# Patient Record
Sex: FEMALE | Race: WHITE | NOT HISPANIC OR LATINO | Employment: FULL TIME | URBAN - METROPOLITAN AREA
[De-identification: names, ages, dates, MRNs, and addresses within clinical notes are randomized per-mention and may not be internally consistent; named-entity substitution may affect disease eponyms.]

---

## 2018-11-28 ENCOUNTER — TELEPHONE (OUTPATIENT)
Dept: NEUROLOGY | Facility: CLINIC | Age: 28
End: 2018-11-28

## 2018-11-28 ENCOUNTER — TRANSCRIBE ORDERS (OUTPATIENT)
Dept: NEUROLOGY | Facility: CLINIC | Age: 28
End: 2018-11-28

## 2018-11-28 DIAGNOSIS — M79.641 PAIN OF RIGHT HAND: Primary | ICD-10-CM

## 2018-11-28 DIAGNOSIS — M54.50 LOW BACK PAIN: Primary | ICD-10-CM

## 2018-12-07 ENCOUNTER — PROCEDURE VISIT (OUTPATIENT)
Dept: NEUROLOGY | Facility: CLINIC | Age: 28
End: 2018-12-07
Payer: COMMERCIAL

## 2018-12-07 DIAGNOSIS — M79.641 PAIN OF RIGHT HAND: ICD-10-CM

## 2018-12-07 PROCEDURE — 95886 MUSC TEST DONE W/N TEST COMP: CPT | Performed by: PSYCHIATRY & NEUROLOGY

## 2018-12-07 PROCEDURE — 95908 NRV CNDJ TST 3-4 STUDIES: CPT | Performed by: PSYCHIATRY & NEUROLOGY

## 2018-12-07 NOTE — PROGRESS NOTES
EMG 1 Limb     Date/Time 12/7/2018 9:12 AM     Performed by  Chiquis Desai     Authorized by Sharita Golden            EMG RIGHT UPPER EXTREMITY    Motor and sensory conduction studies were performed on the right median and ulnar nerves  The distal motor latencies were normal  The motor action potential amplitudes were normal  Motor conduction velocities were normal including conduction of the ulnar nerve across the elbow  The right median and ulnar F waves were normal     Right median and ulnar sensory latencies were normal including median palmar stimulation with normal sensory action potential amplitudes  Concentric needle EMG was performed in various distal and proximal muscles of the right upper extremity including APB, FDI, EDC, brachial radialis, biceps, triceps in the low cervical paraspinal region  There was no evidence of spontaneous activity seen   The compound motor unit potentials were of normal configuration and interference patterns were full or full for effort    IMPRESSION: This is a normal EMG of the right upper extremity    RAVINDER Quijano

## 2018-12-10 ENCOUNTER — EVALUATION (OUTPATIENT)
Dept: PHYSICAL THERAPY | Facility: CLINIC | Age: 28
End: 2018-12-10
Payer: COMMERCIAL

## 2018-12-10 DIAGNOSIS — M54.2 NECK PAIN: Primary | ICD-10-CM

## 2018-12-10 DIAGNOSIS — M54.5 ACUTE BILATERAL LOW BACK PAIN, WITH SCIATICA PRESENCE UNSPECIFIED: ICD-10-CM

## 2018-12-10 PROCEDURE — G8990 OTHER PT/OT CURRENT STATUS: HCPCS | Performed by: PHYSICAL THERAPIST

## 2018-12-10 PROCEDURE — 97162 PT EVAL MOD COMPLEX 30 MIN: CPT | Performed by: PHYSICAL THERAPIST

## 2018-12-10 PROCEDURE — 97014 ELECTRIC STIMULATION THERAPY: CPT | Performed by: PHYSICAL THERAPIST

## 2018-12-10 PROCEDURE — 97140 MANUAL THERAPY 1/> REGIONS: CPT | Performed by: PHYSICAL THERAPIST

## 2018-12-10 PROCEDURE — G8991 OTHER PT/OT GOAL STATUS: HCPCS | Performed by: PHYSICAL THERAPIST
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
## 2018-12-11 ENCOUNTER — TRANSCRIBE ORDERS (OUTPATIENT)
Dept: PHYSICAL THERAPY | Facility: CLINIC | Age: 28
End: 2018-12-11

## 2018-12-11 DIAGNOSIS — M54.5 LOW BACK PAIN, UNSPECIFIED BACK PAIN LATERALITY, UNSPECIFIED CHRONICITY, WITH SCIATICA PRESENCE UNSPECIFIED: ICD-10-CM

## 2018-12-11 DIAGNOSIS — M54.2 CERVICALGIA: Primary | ICD-10-CM

## 2018-12-13 ENCOUNTER — OFFICE VISIT (OUTPATIENT)
Dept: PHYSICAL THERAPY | Facility: CLINIC | Age: 28
End: 2018-12-13
Payer: COMMERCIAL

## 2018-12-13 DIAGNOSIS — M54.2 NECK PAIN: Primary | ICD-10-CM

## 2018-12-13 DIAGNOSIS — M54.5 ACUTE BILATERAL LOW BACK PAIN, WITH SCIATICA PRESENCE UNSPECIFIED: ICD-10-CM

## 2018-12-13 PROCEDURE — 97014 ELECTRIC STIMULATION THERAPY: CPT

## 2018-12-13 PROCEDURE — 97110 THERAPEUTIC EXERCISES: CPT

## 2018-12-13 PROCEDURE — 97140 MANUAL THERAPY 1/> REGIONS: CPT

## 2018-12-13 NOTE — PROGRESS NOTES
Daily Note     Today's date: 2018  Patient name: Nicole Mckeon  : 1990  MRN: [id number]  Referring provider: Jake Mejia MD  Dx:   Encounter Diagnosis     ICD-10-CM    1  Neck pain M54 2    2  Acute bilateral low back pain, with sciatica presence unspecified M54 5        Start Time: 1300  Stop Time: 1400  Total time in clinic (min): 60 minutes    Subjective: pt notes increased pain after initial PT session  Pt states pain is constant and HA's come and go  Objective: See treatment diary below      Assessment: Tolerated treatment fairly  Continued w/ MH/ES f/b manual stretches to cervical and LB  HEP issued and pt educated on PT services and goals for PT  At this time pt doesn't want to try the pool  Sig tightness in cervical paraspinals and SO region  Tightness noted in B LE's also  No ex's till pt's pain levels decreases  Patient would benefit from continued PT      Plan: Continue per plan of care       Precautions: DDD L4-5    Daily Treatment Diary     Modalities  12/10 12/13           MH/ES LB HT 10' 10           MH/ES C/S HT 10' 10                          Manual  12/10 12/13           LB/LE's 8' 10           Neck 8' 15                        HEP  10           Pt ed  10               Exercise Diary

## 2018-12-17 ENCOUNTER — OFFICE VISIT (OUTPATIENT)
Dept: PHYSICAL THERAPY | Facility: CLINIC | Age: 28
End: 2018-12-17
Payer: COMMERCIAL

## 2018-12-17 DIAGNOSIS — M54.2 NECK PAIN: Primary | ICD-10-CM

## 2018-12-17 DIAGNOSIS — M54.5 ACUTE BILATERAL LOW BACK PAIN, WITH SCIATICA PRESENCE UNSPECIFIED: ICD-10-CM

## 2018-12-17 PROCEDURE — 97014 ELECTRIC STIMULATION THERAPY: CPT | Performed by: PHYSICAL THERAPIST

## 2018-12-17 PROCEDURE — 97140 MANUAL THERAPY 1/> REGIONS: CPT | Performed by: PHYSICAL THERAPIST

## 2018-12-17 PROCEDURE — 97110 THERAPEUTIC EXERCISES: CPT | Performed by: PHYSICAL THERAPIST

## 2018-12-17 NOTE — PROGRESS NOTES
Daily Note     Today's date: 2018  Patient name: Blanca Altman  : 1990  MRN: [id number]  Referring provider: Iker Reardon MD  Dx:   Encounter Diagnosis     ICD-10-CM    1  Neck pain M54 2    2  Acute bilateral low back pain, with sciatica presence unspecified M54 5        Start Time: 1345  Stop Time: 1430  Total time in clinic (min): 45 minutes    Subjective: Patient reports her neck is worse, feeling dizzy  Patient taking IBP  Objective: See treatment diary below    Precautions: DDD L4-5    Daily Treatment Diary     Modalities  12/10 12/13 12/17          MH/ES LB HT 10' 10 10'          MH/ES C/S HT 10' 10 10'                         Manual  12/10 12/13 12/17          LB/LE's 8' 10 10'          Neck 8' 15 10'                       HEP  10 reviewed 8'          Pt ed  10               Exercise Diary                                                                                                                                                                                                                                                                                          Assessment: Tolerated treatment fair  Patient would benefit from continued PT Patient has pain with stretching and tolerates slow movements  Patient guarded with movements also  Patient instructed to continue with HEP as previously instructed  Reviewed HEP  Patient felt worse after the heat and stim in her Low back today  Advised patient call md regarding pain and no relief with therapy  MD told her injections also a form of treatment, but patient wanted to try PT first  Patient willing to try aquatic therapy next week  Plan: Continue per plan of care

## 2018-12-20 ENCOUNTER — APPOINTMENT (OUTPATIENT)
Dept: PHYSICAL THERAPY | Facility: CLINIC | Age: 28
End: 2018-12-20
Payer: COMMERCIAL

## 2018-12-20 NOTE — PROGRESS NOTES
Daily Note     Today's date: 2018  Patient name: Gal Cruz  : 1990  MRN: [id number]  Referring provider: Albert Sosa MD  Dx:   Encounter Diagnosis     ICD-10-CM    1  Neck pain M54 2    2  Acute bilateral low back pain, with sciatica presence unspecified M54 5                   Subjective: ***      Objective: See treatment diary below  Precautions: DDD L4-5    Daily Treatment Diary     Modalities  12/10 12/13 12/17          MH/ES LB HT 10' 10 10'          MH/ES C/S HT 10' 10 10'                         Manual  12/10 12/13 12/17          LB/LE's 8' 10 10'          Neck 8' 15 10'                       HEP  10 reviewed 8'          Pt ed  10               Exercise Diary                                                                                                                                                                                                                                                                                            Assessment: Tolerated treatment {Tolerated treatment :3180090800}   Patient {assessment:[id number]}      Plan: {PLAN:2108950970}

## 2018-12-20 NOTE — PROGRESS NOTES
Daily Note     Today's date: 2018  Patient name: Terry Esteban  : 1990  MRN: [id number]  Referring provider: Sabina Rdz MD  Dx:   Encounter Diagnosis     ICD-10-CM    1  Neck pain M54 2    2  Acute bilateral low back pain, with sciatica presence unspecified M54 5        Start Time: 0815  Stop Time: 0900  Total time in clinic (min): 45 minutes    Subjective: Patient notes slipping on mud last night with increased right-sided low back pain this morning  9/10 right LBP  3/10 neck pain with stiffness  Notes interrupted sleep and limited positional tolerance for cooking, cleaning and other IADLs  Objective: See treatment diary below      Assessment: Tolerated treatment well  Patient would benefit from continued PT Moderate tightness bilateral paracervical and SO musculature, right greater than left  Guarded to PROM of LEs especially right  Continues with pain and paraesthesia in right UE/LE      Plan: Continue per plan of care  Add exercises as tolerated        Precautions: DDD L4-5    Daily Treatment Diary     Modalities  12/10 12/13 12/17 12/21         MH/ES LB HT 10' 10 10' 10'         MH/ES C/S HT 10' 10 10' 10'                        Manual  12/10 12/13 12/17 12/21         LB/LE's 8' 10 10' 10'         Neck 8' 15 10' 10'                      HEP  10 reviewed 8' review         Pt ed  10  8             Exercise Diary

## 2018-12-21 ENCOUNTER — OFFICE VISIT (OUTPATIENT)
Dept: PHYSICAL THERAPY | Facility: CLINIC | Age: 28
End: 2018-12-21
Payer: COMMERCIAL

## 2018-12-21 DIAGNOSIS — M54.5 ACUTE BILATERAL LOW BACK PAIN, WITH SCIATICA PRESENCE UNSPECIFIED: ICD-10-CM

## 2018-12-21 DIAGNOSIS — M54.2 NECK PAIN: Primary | ICD-10-CM

## 2018-12-21 PROCEDURE — 97110 THERAPEUTIC EXERCISES: CPT | Performed by: PHYSICAL THERAPIST

## 2018-12-21 PROCEDURE — 97140 MANUAL THERAPY 1/> REGIONS: CPT | Performed by: PHYSICAL THERAPIST

## 2018-12-21 PROCEDURE — 97014 ELECTRIC STIMULATION THERAPY: CPT | Performed by: PHYSICAL THERAPIST

## 2018-12-21 PROCEDURE — 97010 HOT OR COLD PACKS THERAPY: CPT | Performed by: PHYSICAL THERAPIST

## 2018-12-24 ENCOUNTER — OFFICE VISIT (OUTPATIENT)
Dept: NEUROLOGY | Facility: CLINIC | Age: 28
End: 2018-12-24
Payer: COMMERCIAL

## 2018-12-24 ENCOUNTER — OFFICE VISIT (OUTPATIENT)
Dept: PHYSICAL THERAPY | Facility: CLINIC | Age: 28
End: 2018-12-24
Payer: COMMERCIAL

## 2018-12-24 VITALS
DIASTOLIC BLOOD PRESSURE: 68 MMHG | WEIGHT: 208 LBS | BODY MASS INDEX: 34.66 KG/M2 | HEIGHT: 65 IN | SYSTOLIC BLOOD PRESSURE: 100 MMHG | HEART RATE: 72 BPM

## 2018-12-24 DIAGNOSIS — S06.0X0A CONCUSSION WITHOUT LOSS OF CONSCIOUSNESS, INITIAL ENCOUNTER: Primary | ICD-10-CM

## 2018-12-24 DIAGNOSIS — M54.2 NECK PAIN: Primary | ICD-10-CM

## 2018-12-24 DIAGNOSIS — M54.5 ACUTE BILATERAL LOW BACK PAIN, WITH SCIATICA PRESENCE UNSPECIFIED: ICD-10-CM

## 2018-12-24 DIAGNOSIS — M54.16 RADICULOPATHY, LUMBAR REGION: ICD-10-CM

## 2018-12-24 DIAGNOSIS — R42 DIZZINESS AND GIDDINESS: ICD-10-CM

## 2018-12-24 DIAGNOSIS — G54.0 BRACHIAL PLEXOPATHY: ICD-10-CM

## 2018-12-24 DIAGNOSIS — G47.9 SLEEP DISTURBANCE: ICD-10-CM

## 2018-12-24 PROCEDURE — 99204 OFFICE O/P NEW MOD 45 MIN: CPT | Performed by: PSYCHIATRY & NEUROLOGY

## 2018-12-24 PROCEDURE — 97110 THERAPEUTIC EXERCISES: CPT | Performed by: PHYSICAL THERAPIST

## 2018-12-24 PROCEDURE — 97140 MANUAL THERAPY 1/> REGIONS: CPT | Performed by: PHYSICAL THERAPIST

## 2018-12-24 PROCEDURE — 97014 ELECTRIC STIMULATION THERAPY: CPT | Performed by: PHYSICAL THERAPIST

## 2018-12-24 PROCEDURE — 97010 HOT OR COLD PACKS THERAPY: CPT | Performed by: PHYSICAL THERAPIST

## 2018-12-24 RX ORDER — METHOCARBAMOL 750 MG/1
1-2 TABLET, FILM COATED ORAL EVERY 6 HOURS PRN
Refills: 0 | COMMUNITY
Start: 2018-11-08 | End: 2021-12-27 | Stop reason: ALTCHOICE

## 2018-12-24 RX ORDER — IBUPROFEN 800 MG/1
1 TABLET ORAL 3 TIMES DAILY PRN
COMMUNITY
Start: 2018-12-21

## 2018-12-24 NOTE — PROGRESS NOTES
Daily Note     Today's date: 2018  Patient name: Blanca Altman  : 1990  MRN: [id number]  Referring provider: Iker Reardon MD  Dx:   Encounter Diagnosis     ICD-10-CM    1  Neck pain M54 2    2  Acute bilateral low back pain, with sciatica presence unspecified M54 5                   Subjective: Patient complains of 5/10 to low back and 4/10 to cervical area  Objective: See treatment diary below      Precautions: DDD L4-5    Daily Treatment Diary     Modalities  12/10 12/13 12/17 12/21 12/24        MH/ES LB HT 10' 10 10' 10' 10        MH/ES C/S HT 10' 10 10' 10' 10                       Manual  12/10 12/13 12/17 12/21 12/24        LB/LE's 8' 10 10' 10' 10        Neck 8' 15 10' 10' 10                     HEP  10 reviewed 8' review         Pt ed  10  8             Exercise Diary              SLANT 30"/4x            BIKE 5 min                                                                                                                                                                                                                                                                  Assessment: Tolerated treatment fair  Patient exhibited good technique with therapeutic exercises and would benefit from continued PT, cervical spine is guarded with ROM , 

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
## 2018-12-26 ENCOUNTER — APPOINTMENT (OUTPATIENT)
Dept: PHYSICAL THERAPY | Facility: CLINIC | Age: 28
End: 2018-12-26
Payer: COMMERCIAL

## 2018-12-26 NOTE — PROGRESS NOTES
Daily Note     Today's date: 2018  Patient name: Lizzy Shelley  : 1990  MRN: [id number]  Referring provider: Jeanie Kanner, MD  Dx:   Encounter Diagnosis     ICD-10-CM    1  Neck pain M54 2    2  Acute bilateral low back pain, with sciatica presence unspecified M54 5                   Subjective: ***      Objective: See treatment diary below      Precautions: DDD L4-5    Daily Treatment Diary     Modalities  12/10 12/13 12/17 12/21 12/24        MH/ES LB HT 10' 10 10' 10' 10        MH/ES C/S HT 10' 10 10' 10' 10                       Manual  12/10 12/13 12/17 12/21 12/24        LB/LE's 8' 10 10' 10' 10        Neck 8' 15 10' 10' 10                     HEP  10 reviewed 8' review         Pt ed  10  8             Exercise Diary              SLANT 30"/4x            BIKE 5 min                                                                                                                                                                                                                                                        Assessment: Tolerated treatment {Tolerated treatment :8028655130}   Patient {assessment:4170065822}      Plan: {PLAN:[id number]}

## 2019-01-02 ENCOUNTER — OFFICE VISIT (OUTPATIENT)
Dept: PHYSICAL THERAPY | Facility: CLINIC | Age: 29
End: 2019-01-02
Payer: COMMERCIAL

## 2019-01-02 DIAGNOSIS — M54.2 NECK PAIN: Primary | ICD-10-CM

## 2019-01-02 DIAGNOSIS — M54.5 ACUTE BILATERAL LOW BACK PAIN, WITH SCIATICA PRESENCE UNSPECIFIED: ICD-10-CM

## 2019-01-02 PROCEDURE — 97140 MANUAL THERAPY 1/> REGIONS: CPT | Performed by: PHYSICAL THERAPIST

## 2019-01-02 PROCEDURE — 97014 ELECTRIC STIMULATION THERAPY: CPT | Performed by: PHYSICAL THERAPIST

## 2019-01-02 PROCEDURE — 97110 THERAPEUTIC EXERCISES: CPT | Performed by: PHYSICAL THERAPIST

## 2019-01-02 PROCEDURE — 97010 HOT OR COLD PACKS THERAPY: CPT | Performed by: PHYSICAL THERAPIST

## 2019-01-02 NOTE — PROGRESS NOTES
Daily Note     Today's date: 2019  Patient name: Lizzy Shelley  : 1990  MRN: [id number]  Referring provider: Jeanie Kanner, MD  Dx:   Encounter Diagnosis     ICD-10-CM    1  Neck pain M54 2    2  Acute bilateral low back pain, with sciatica presence unspecified M54 5        Start Time: 1515          Subjective: Patient complains of increased neck pain secondary to damp weather today  Objective: See treatment diary below        Precautions: DDD L4-5    Daily Treatment Diary     Modalities  12/10 12/13 12/17 12/21 12/24 1/2       MH/ES LB HT 10' 10 10' 10' 10 10       MH/ES C/S HT 10' 10 10' 10' 10 10                      Manual  12/10 12/13 12/17 12/21 12/24 1/2       LB/LE's 8' 10 10' 10' 10 10       Neck 8' 15 10' 10' 10 10                    HEP  10 reviewed 8' review         Pt ed  10  8             Exercise Diary         SLANT 30"/4x     4x       BIKE 5 min     5 min       Ball EXS      30x                                                                                                                                                                                                                                      Assessment: Tolerated treatment fair  Patient exhibited good technique with therapeutic exercises and would benefit from continued PT, patient still guarded  with MT but making slow progress  Plan: Progress treatment as tolerated

## 2019-01-03 ENCOUNTER — OFFICE VISIT (OUTPATIENT)
Dept: PHYSICAL THERAPY | Facility: CLINIC | Age: 29
End: 2019-01-03
Payer: COMMERCIAL

## 2019-01-03 DIAGNOSIS — M54.2 NECK PAIN: Primary | ICD-10-CM

## 2019-01-03 DIAGNOSIS — M54.5 ACUTE BILATERAL LOW BACK PAIN, WITH SCIATICA PRESENCE UNSPECIFIED: ICD-10-CM

## 2019-01-03 PROCEDURE — 97014 ELECTRIC STIMULATION THERAPY: CPT | Performed by: PHYSICAL THERAPIST

## 2019-01-03 PROCEDURE — 97110 THERAPEUTIC EXERCISES: CPT | Performed by: PHYSICAL THERAPIST

## 2019-01-03 PROCEDURE — 97010 HOT OR COLD PACKS THERAPY: CPT | Performed by: PHYSICAL THERAPIST

## 2019-01-03 PROCEDURE — 97140 MANUAL THERAPY 1/> REGIONS: CPT | Performed by: PHYSICAL THERAPIST

## 2019-01-03 NOTE — PROGRESS NOTES
Daily Note     Today's date: 1/3/2019  Patient name: Baron Grullon  : 1990  MRN: [id number]  Referring provider: Beto Jasmine MD  Dx:   Encounter Diagnosis     ICD-10-CM    1  Neck pain M54 2    2  Acute bilateral low back pain, with sciatica presence unspecified M54 5        Start Time: 1300  Stop Time: 1345  Total time in clinic (min): 45 minutes    Subjective: Patient reports she saw neurologist who ordered vestibular PT, and possible brachial plexus injury on right  Objective: See treatment diary below    Precautions: DDD L4-5    Daily Treatment Diary     Modalities  12/10 12/13 12/17 12/21 12/24 1/2 1/3      MH/ES LB HT 10' 10 10' 10' 10 10 10'      MH/ES C/S HT 10' 10 10' 10' 10 10 10'                     Manual  12/10 12/13 12/17 12/21 12/24 1/2 1/3      LB/LE's 8' 10 10' 10' 10 10 10'      Neck 8' 15 10' 10' 10 10 10'                   HEP  10 reviewed 8' review         Pt ed  10  8             Exercise Diary  12/24     1/2 1/3      SLANT 30"/4x     4x 30" 4x      BIKE 5 min     5 min 5'      Ball EXS      30x nt      rows       30# 30x                                                                                                                                                                                                                        Assessment: Tolerated treatment well  Patient would benefit from continued PT Patient tolerates stretching UE's and LE's better with less guarding  Patient c/o tingling in right lateral fingers with left side bending  Plan: Continue per plan of care

## 2019-01-04 ENCOUNTER — APPOINTMENT (OUTPATIENT)
Dept: PHYSICAL THERAPY | Facility: CLINIC | Age: 29
End: 2019-01-04
Payer: COMMERCIAL

## 2019-01-08 ENCOUNTER — EVALUATION (OUTPATIENT)
Dept: PHYSICAL THERAPY | Facility: CLINIC | Age: 29
End: 2019-01-08
Payer: COMMERCIAL

## 2019-01-08 DIAGNOSIS — S06.0X0D CONCUSSION WITHOUT LOSS OF CONSCIOUSNESS, SUBSEQUENT ENCOUNTER: ICD-10-CM

## 2019-01-08 DIAGNOSIS — R42 DIZZINESS AND GIDDINESS: Primary | ICD-10-CM

## 2019-01-08 DIAGNOSIS — H81.11 BPPV (BENIGN PAROXYSMAL POSITIONAL VERTIGO), RIGHT: ICD-10-CM

## 2019-01-08 PROCEDURE — 97110 THERAPEUTIC EXERCISES: CPT | Performed by: PHYSICAL THERAPIST

## 2019-01-08 PROCEDURE — 97162 PT EVAL MOD COMPLEX 30 MIN: CPT | Performed by: PHYSICAL THERAPIST

## 2019-01-08 PROCEDURE — G8991 OTHER PT/OT GOAL STATUS: HCPCS | Performed by: PHYSICAL THERAPIST

## 2019-01-08 PROCEDURE — 95992 CANALITH REPOSITIONING PROC: CPT | Performed by: PHYSICAL THERAPIST

## 2019-01-08 PROCEDURE — G8990 OTHER PT/OT CURRENT STATUS: HCPCS | Performed by: PHYSICAL THERAPIST

## 2019-01-11 ENCOUNTER — OFFICE VISIT (OUTPATIENT)
Dept: PHYSICAL THERAPY | Facility: CLINIC | Age: 29
End: 2019-01-11
Payer: COMMERCIAL

## 2019-01-11 DIAGNOSIS — R42 DIZZINESS AND GIDDINESS: Primary | ICD-10-CM

## 2019-01-11 DIAGNOSIS — S06.0X0D CONCUSSION WITHOUT LOSS OF CONSCIOUSNESS, SUBSEQUENT ENCOUNTER: ICD-10-CM

## 2019-01-11 DIAGNOSIS — H81.11 BPPV (BENIGN PAROXYSMAL POSITIONAL VERTIGO), RIGHT: ICD-10-CM

## 2019-01-11 PROCEDURE — 95992 CANALITH REPOSITIONING PROC: CPT | Performed by: PHYSICAL THERAPIST

## 2019-01-11 PROCEDURE — 97112 NEUROMUSCULAR REEDUCATION: CPT | Performed by: PHYSICAL THERAPIST

## 2019-01-11 NOTE — PROGRESS NOTES
Daily Note     Today's date: 2019  Patient name: Ledy Simental  : 1990  MRN: [id number]  Referring provider: Marcos Hennessy MD  Dx:   Encounter Diagnosis     ICD-10-CM    1  Dizziness and giddiness R42    2  Concussion without loss of consciousness, subsequent encounter S06 0X0D    3  BPPV (benign paroxysmal positional vertigo), right H81 11        Start Time: 0945  Stop Time: 1030  Total time in clinic (min): 45 minutes    Subjective: Dizzy after last session  Pt reports she is doing gaze exercises but must do them slowly  Objective: See treatment diary below      Assessment: Tolerated treatment fair  Pt needed frequent rest periods due to symptom production  C/o "floaters" at times  Light sensitivity  Pt has difficulty with moving her head and eyes quickly and modified pacing  Patient would benefit from continued PT      Plan: Continue per plan of care       Precautions: standard    Daily Treatment Diary     Manual             Epley Rx2 x1                                                                   Exercise Diary             Peg   15x ea           Peg board hi low  10x ea           Ball toss  3'           Ball/head rotation  10x2 ea           Tandem walk  Side step  grapevine  2x ea           Gaze exs  10'                                                                                                                                             HEP x1gaze  Home Epley 10'                                                       Modalities

## 2019-01-16 ENCOUNTER — APPOINTMENT (OUTPATIENT)
Dept: PHYSICAL THERAPY | Facility: CLINIC | Age: 29
End: 2019-01-16
Payer: COMMERCIAL

## 2019-01-21 ENCOUNTER — OFFICE VISIT (OUTPATIENT)
Dept: PHYSICAL THERAPY | Facility: CLINIC | Age: 29
End: 2019-01-21
Payer: COMMERCIAL

## 2019-01-21 DIAGNOSIS — R42 DIZZINESS AND GIDDINESS: Primary | ICD-10-CM

## 2019-01-21 DIAGNOSIS — S06.0X0D CONCUSSION WITHOUT LOSS OF CONSCIOUSNESS, SUBSEQUENT ENCOUNTER: ICD-10-CM

## 2019-01-21 DIAGNOSIS — H81.11 BPPV (BENIGN PAROXYSMAL POSITIONAL VERTIGO), RIGHT: ICD-10-CM

## 2019-01-21 PROCEDURE — 97112 NEUROMUSCULAR REEDUCATION: CPT | Performed by: PHYSICAL THERAPIST

## 2019-01-21 PROCEDURE — 95992 CANALITH REPOSITIONING PROC: CPT | Performed by: PHYSICAL THERAPIST

## 2019-01-21 NOTE — PROGRESS NOTES
Daily Note     Today's date: 2019  Patient name: Macarena Blair  : 1990  MRN: [id number]  Referring provider: Ashlyn Sigala MD  Dx:   Encounter Diagnosis     ICD-10-CM    1  Dizziness and giddiness R42    2  Concussion without loss of consciousness, subsequent encounter S06 0X0D    3  BPPV (benign paroxysmal positional vertigo), right H81 11        Start Time: 1400  Stop Time: 1445  Total time in clinic (min): 45 minutes    Subjective: Patient reports she is seeing a chiropractor and feeling better in neck, back and dizziness  Objective: See treatment diary below  Precautions: standard    Daily Treatment Diary     Manual            Epley Rx2 x1 R x1                                                                  Exercise Diary            Peg   15x ea 15x ea          Peg board hi low  10x ea 10xea          Ball toss  3' 3'          Ball/head rotation  10x2 ea 10x ea          Tandem walk  Side step  grapevine  2x ea 2x ea          Gaze exs  10' 10'                                                                                                                                            HEP x1gaze  Home Epley 10'                                                         Assessment: Tolerated treatment fair  Patient would benefit from continued PT Patient got lightheaded during ball toss exercises, dizzy with gaze exercises  Patient c/o LBP with bending exercises, modified exercises to take pressure off back but still challenge eyes  Patient planning on returning to work soon as her FMLA is expiring  Plan: Continue per plan of care

## 2019-01-28 ENCOUNTER — OFFICE VISIT (OUTPATIENT)
Dept: PHYSICAL THERAPY | Facility: CLINIC | Age: 29
End: 2019-01-28
Payer: COMMERCIAL

## 2019-01-28 ENCOUNTER — OFFICE VISIT (OUTPATIENT)
Dept: NEUROLOGY | Facility: CLINIC | Age: 29
End: 2019-01-28
Payer: COMMERCIAL

## 2019-01-28 VITALS
BODY MASS INDEX: 34.69 KG/M2 | WEIGHT: 208.2 LBS | DIASTOLIC BLOOD PRESSURE: 68 MMHG | SYSTOLIC BLOOD PRESSURE: 110 MMHG | HEART RATE: 76 BPM | HEIGHT: 65 IN

## 2019-01-28 DIAGNOSIS — H81.11 BPPV (BENIGN PAROXYSMAL POSITIONAL VERTIGO), RIGHT: ICD-10-CM

## 2019-01-28 DIAGNOSIS — G54.0 BRACHIAL PLEXOPATHY: ICD-10-CM

## 2019-01-28 DIAGNOSIS — R42 DIZZINESS AND GIDDINESS: ICD-10-CM

## 2019-01-28 DIAGNOSIS — M54.16 RADICULOPATHY, LUMBAR REGION: ICD-10-CM

## 2019-01-28 DIAGNOSIS — R42 DIZZINESS AND GIDDINESS: Primary | ICD-10-CM

## 2019-01-28 DIAGNOSIS — S06.0X0D CONCUSSION WITHOUT LOSS OF CONSCIOUSNESS, SUBSEQUENT ENCOUNTER: Primary | ICD-10-CM

## 2019-01-28 PROCEDURE — 99213 OFFICE O/P EST LOW 20 MIN: CPT | Performed by: PSYCHIATRY & NEUROLOGY

## 2019-01-28 PROCEDURE — 97112 NEUROMUSCULAR REEDUCATION: CPT | Performed by: PHYSICAL THERAPIST

## 2019-01-28 NOTE — PROGRESS NOTES
Daily Note     Today's date: 2019  Patient name: Lanre Beyer  : 1990  MRN: [id number]  Referring provider: Florian White MD  Dx:   Encounter Diagnosis     ICD-10-CM    1  Dizziness and giddiness R42    2  BPPV (benign paroxysmal positional vertigo), right H81 11        Start Time: 0930  Stop Time: 1010  Total time in clinic (min): 40 minutes    Subjective: Pt still noting difficulty with dizziness  Pt also noting numbness in right hand digits 4,5  Seeing chiropractor  Objective: See treatment diary below      Assessment: Tolerated treatment well  Patient pt still has difficulty moving from head down to head up in repetition and with gaze exercises where eyes are moving only with head fixed  Plan: Continue per plan of care       Precautions: standard    Daily Treatment Diary     Manual           Epley Rx2 x1 R x1                                                                  Exercise Diary           Peg   15x ea 15x ea 15x         Peg board hi low  10x ea 10xea 10x         Ball toss  3' 3' 3'         Ball/head rotation  10x2 ea 10x ea          Tandem walk  Side step  grapevine  2x ea 2x ea 3x ea         Gaze exs  10' 10' 15'                                                                                                                                           HEP x1gaze  Home Epley 10'                                                   0

## 2019-01-28 NOTE — PROGRESS NOTES
Blanca Altman is a 25 y o  female returns in follow-up today with history of concussion, neck pain and low back pain    Assessment:  1  Concussion without loss of consciousness, subsequent encounter    2  Dizziness and giddiness    3  Brachial plexopathy    4  Radiculopathy, lumbar region        Plan:  Continue vestibular therapy  Continue to work with Orthopedics regarding neck pain and back pain  Follow-up 6 weeks    Discussion:  Ralph Zavaleta continues to have symptoms following her concussion  She will continue with vestibular therapy  She anticipates returning back to work later this week I will see her back in follow-up in 6 weeks      Subjective:    HPI  Ralph Zavaleta reports that she has been going to vestibular therapy and has seen some improvements  They have been working on visual tracking  She continues to note issues with dizziness  She states she was at a  yesterday and the floor was uneven and had a rug with a pattern that made it difficult for her to keep standing as she felt unsteady with this  She states that recently her 's cologne will give her headache but has not been noting frequent headaches  She states she continues to find memory issues and has to write things down  She continues to have back pain and anticipates having an ejection on Wednesday  She continues to have pain radiating into her right upper extremity probably in the medial 2 fingers of the hand  She anticipates returning back to work in the near future  She did find that melatonin was not effective to help with her still sleeping    She does not want anything heavier for sleep as she has to take care of 1 of her children who has asthma      Past Medical History:   Diagnosis Date    Brachial plexopathy     radiculopathy    Concussion     Dizziness and giddiness     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       Family History:  Family History   Problem Relation Age of Onset    Diabetes type II Mother  Deafness Father     Depression Father     Asthma Son        Past Surgical History:  Past Surgical History:   Procedure Laterality Date     SECTION      x2       Social History:   reports that she has never smoked  She has never used smokeless tobacco  She reports that she drinks alcohol  She reports that she does not use drugs  Allergies:  Fioricet [butalbital-apap-caffeine] and Fioricet [butalbital-apap-caffeine]      Current Outpatient Prescriptions:     ibuprofen (MOTRIN) 800 mg tablet, Take 1 tablet by mouth 3 (three) times a day as needed, Disp: , Rfl:     Melatonin 5 MG CAPS, One or 2 p o  q h s  p r n  sleep, Disp: 30 capsule, Rfl: 5    methocarbamol (ROBAXIN) 750 mg tablet, Take 1-2 tablets by mouth every 6 (six) hours as needed, Disp: , Rfl: 0    I have reviewed the past medical, social and family history, current medications, allergies, vitals, review of systems and updated this information as appropriate today     Objective:    Vitals:  Blood pressure 110/68, pulse 76, height 5' 4 5" (1 638 m), weight 94 4 kg (208 lb 3 2 oz)  Physical Exam    Neurological Exam  GENERAL:  Well-developed well-nourished woman in no acute distress  HEENT/NECK: Head is atraumatic normocephalic, neck is supple  NEUROLOGIC:  Mental Status: Awake and alert without aphasia  Cranial Nerves: Extraocular movements are full  Face is symmetrical  Coordination:  Gait is stable            ROS:    Review of Systems   Constitutional: Positive for fatigue  Negative for appetite change and fever  HENT: Positive for postnasal drip  Negative for hearing loss, tinnitus, trouble swallowing and voice change  Eyes: Negative  Negative for photophobia and pain  Respiratory: Negative  Negative for shortness of breath  Cardiovascular: Negative  Negative for palpitations  Gastrointestinal: Positive for nausea  Negative for vomiting  Endocrine: Negative  Negative for cold intolerance and heat intolerance  Genitourinary: Negative  Negative for dysuria, frequency and urgency  Musculoskeletal: Positive for back pain, neck pain and neck stiffness  Negative for myalgias  Skin: Negative  Negative for rash  Neurological: Positive for light-headedness, numbness (right hand) and headaches (occasional)  Negative for dizziness, tremors, seizures, syncope, facial asymmetry, speech difficulty and weakness  Hematological: Negative  Does not bruise/bleed easily  Psychiatric/Behavioral: Negative for confusion, hallucinations and sleep disturbance  Trouble remembering   All other systems reviewed and are negative

## 2019-01-29 ENCOUNTER — OFFICE VISIT (OUTPATIENT)
Dept: PHYSICAL THERAPY | Facility: CLINIC | Age: 29
End: 2019-01-29
Payer: COMMERCIAL

## 2019-01-29 DIAGNOSIS — H81.11 BPPV (BENIGN PAROXYSMAL POSITIONAL VERTIGO), RIGHT: Primary | ICD-10-CM

## 2019-01-29 PROCEDURE — 97112 NEUROMUSCULAR REEDUCATION: CPT | Performed by: PHYSICAL THERAPIST

## 2019-01-29 NOTE — PROGRESS NOTES
Daily Note     Today's date: 2019  Patient name: Lanre Beyer  : 1990  MRN: [id number]  Referring provider: Florian White MD  Dx:   Encounter Diagnosis     ICD-10-CM    1  BPPV (benign paroxysmal positional vertigo), right H81 11        Start Time: 0800  Stop Time: 0830  Total time in clinic (min): 30 minutes    Subjective: Pt continues to report varying episodes of dizziness  Still having difficulty tracking   especially on  computer screen  Objective: See treatment diary below      Assessment: Tolerated treatment fairly well today    Patient balance improving  Independent correction on compliant surface today  Added compliant challenge with use of [place] Eye Chart for gaze challenges  Pt tolerated long consistent periods of gaze exercises without rest       Plan: Continue per plan of care         Precautions: standard    Daily Treatment Diary     Manual          Epley Rx2 x1 R x1                                                                  Exercise Diary          Peg   15x ea 15x ea 15x 15x ea        Peg board hi low  10x ea 10xea 10x 10x        Ball toss  3' 3' 3' 3'        Ball/head rotation  10x2 ea 10x ea  10ea eyes fixed  10xea head/eye move        Tandem walk  Side step  grapevine  2x ea 2x ea 3x ea 3x ea        Gaze exs  10' 10' 15' 15'        Pods stance Eye chart                                                                                                                                  HEP x1gaze  Home Epley 10'

## 2019-02-07 ENCOUNTER — OFFICE VISIT (OUTPATIENT)
Dept: PHYSICAL THERAPY | Age: 29
End: 2019-02-07
Payer: COMMERCIAL

## 2019-02-07 ENCOUNTER — PROCEDURE VISIT (OUTPATIENT)
Dept: NEUROLOGY | Facility: CLINIC | Age: 29
End: 2019-02-07
Payer: COMMERCIAL

## 2019-02-07 DIAGNOSIS — R42 DIZZINESS AND GIDDINESS: ICD-10-CM

## 2019-02-07 DIAGNOSIS — M54.16 RADICULOPATHY, LUMBAR REGION: Primary | ICD-10-CM

## 2019-02-07 DIAGNOSIS — S06.0X0D CONCUSSION WITHOUT LOSS OF CONSCIOUSNESS, SUBSEQUENT ENCOUNTER: Primary | ICD-10-CM

## 2019-02-07 DIAGNOSIS — H81.11 BPPV (BENIGN PAROXYSMAL POSITIONAL VERTIGO), RIGHT: ICD-10-CM

## 2019-02-07 PROCEDURE — 95886 MUSC TEST DONE W/N TEST COMP: CPT | Performed by: PSYCHIATRY & NEUROLOGY

## 2019-02-07 PROCEDURE — 95910 NRV CNDJ TEST 7-8 STUDIES: CPT | Performed by: PSYCHIATRY & NEUROLOGY

## 2019-02-07 PROCEDURE — 97112 NEUROMUSCULAR REEDUCATION: CPT

## 2019-02-07 NOTE — PROGRESS NOTES
EMG 2 limb lower extremity     Date/Time 2/7/2019 9:29 AM     Performed by  Issa Lepe     Authorized by Issa Lepe            EMG BILATERAL LOWER EXTREMITY    Motor and sensory conduction studies were performed on the bilateral peroneal, tibial and sural nerves  The distal motor latencies were normal  The motor action potential amplitudes were normal  Conduction velocities were normal including conduction of the peroneal nerve across the fibular head  Bilateral peroneal and tibial F waves were normal     Bilateral sural distal sensory latencies were normal with normal sensory action potential amplitudes  H  reflexes were symmetrically normal     Concentric needle EMG was performed in various distal and proximal muscles of the lower extremities bilaterally including EDB, tibialis anterior, gastrocnemius medius, vastus lateralis, biceps femoralis short head and the low lumbar paraspinal regions  There is no evidence of spontaneous activity seen  The compound motor unit potentials were of normal configuration and interference patterns were full or full for effort  IMPRESSION: This is a normal EMG of the bilateral lower extremities      RAVINDER Fang

## 2019-02-07 NOTE — PROGRESS NOTES
Daily Note     Today's date: 2019  Patient name: Tori Espinoza  : 1990  MRN: [id number]  Referring provider: Makayla Gracia MD  Dx:   Encounter Diagnosis     ICD-10-CM    1  Concussion without loss of consciousness, subsequent encounter S06 [id number]    2  Dizziness and giddiness R42    3  BPPV (benign paroxysmal positional vertigo), right H81 11        Start Time: 0800  Stop Time: 0830  Total time in clinic (min): 30 minutes    Subjective: pt notes increased LBP this week  She states she had an injection last Wednesday and has had increased pain since  She notes looking at the computer for work is getting better  Objective: See treatment diary below      Assessment: Tolerated treatment fairly well  Limited ex's today due to increased LBP, no bending or twisting ex's today  Pt did have some dizziness w/ Patient would benefit from continued PT      Plan: Continue per plan of care       Precautions: standard    Daily Treatment Diary     Manual          Epley Rx2 x1 R x1                                                                  Exercise Diary   2/       Peg   15x ea 15x ea 15x 15x ea NT       Peg board hi low  10x ea 10xea 10x 10x NT       Ball toss  3' 3' 3' 3' NT       Ball/head rotation  10x2 ea 10x ea  10ea eyes fixed  10xea head/eye move 10'       Tandem walk  Side step  grapevine  2x ea 2x ea 3x ea 3x ea NT       Gaze exs  10' 10' 15' 15' 15       Pods stance Eye chart                                                                                                                                  HEP x1gaze  Home Epley 10'

## 2019-02-08 ENCOUNTER — TELEPHONE (OUTPATIENT)
Dept: NEUROLOGY | Facility: CLINIC | Age: 29
End: 2019-02-08

## 2019-02-08 NOTE — TELEPHONE ENCOUNTER
Patient requested recent office noted from visit with Dr Antonietta Ziegler  Note was printed and given to patient yesterday  Released form was sign by patient yesterday

## 2019-02-20 ENCOUNTER — OFFICE VISIT (OUTPATIENT)
Dept: PHYSICAL THERAPY | Age: 29
End: 2019-02-20
Payer: COMMERCIAL

## 2019-02-20 DIAGNOSIS — R42 DIZZINESS AND GIDDINESS: Primary | ICD-10-CM

## 2019-02-20 DIAGNOSIS — H81.11 BPPV (BENIGN PAROXYSMAL POSITIONAL VERTIGO), RIGHT: ICD-10-CM

## 2019-02-20 PROCEDURE — 97112 NEUROMUSCULAR REEDUCATION: CPT | Performed by: PHYSICAL THERAPIST

## 2019-02-20 NOTE — PROGRESS NOTES
Daily Note     Today's date: 2019  Patient name: Matthew Gibson  : 1990  MRN: [id number]  Referring provider: Kayleen King MD  Dx:   Encounter Diagnosis     ICD-10-CM    1  Dizziness and giddiness R42    2  BPPV (benign paroxysmal positional vertigo), right H81 11        Start Time: 0745  Stop Time: 0830  Total time in clinic (min): 45 minutes    Subjective: Pt reports her symptoms vary with good and bad days  She has returned to work and has some difficulty concentrating and working on the computer  She must take rests  She reports today is a bad day and she has increased dizziness  Objective: See treatment diary below      Assessment: Tolerated treatment fair  Patient needed more frequent rests today  Pt did well with balance on compliant surfaces but it did provoke dizziness  She had difficulty with gaze exercises with eyes fixed and head moving today  Plan: Continue per plan of care         Precautions: standard    Daily Treatment Diary     Manual          Epley Rx2 x1 R x1                                                                  Exercise Diary        Peg   15x ea 15x ea 15x 15x ea NT 10x      Peg board hi low  10x ea 10xea 10x 10x NT       Ball toss  3' 3' 3' 3' NT       Ball/head rotation  10x2 ea 10x ea  10ea eyes fixed  10xea head/eye move 10' 10x ea  Ball/head movement 10x      Tandem walk  Side step  grapevine  2x ea 2x ea 3x ea 3x ea NT 2x      Pod balance  Fwd  Staggered  side       3x ea       Gaze exs  10' 10' 15' 15' 15 15'      Pods stance Eye chart                                                                                                                                  HEP x1gaze  Home Epley 10'

## 2019-02-27 ENCOUNTER — APPOINTMENT (OUTPATIENT)
Dept: PHYSICAL THERAPY | Age: 29
End: 2019-02-27
Payer: COMMERCIAL

## 2019-02-28 NOTE — PROGRESS NOTES
Patient started therapy for her dizziness and has not been seen for her neck or back treatments since January 3, Patient states she was seeing a chiropractor instead for those areas  D/C PT to neck and back at this time

## 2019-03-26 NOTE — PROGRESS NOTES
Pt was last seen for PT on 2/20  Pt returned to work  She has not returned to physical therapy and may have been referred for vision therapy  At last session she had good balance on complaint and non-compliant challenges  She still has some difficulty with gaze exercises

## 2019-05-01 ENCOUNTER — TELEPHONE (OUTPATIENT)
Dept: NEUROLOGY | Facility: CLINIC | Age: 29
End: 2019-05-01

## 2019-06-11 ENCOUNTER — TELEPHONE (OUTPATIENT)
Dept: NEUROLOGY | Facility: CLINIC | Age: 29
End: 2019-06-11

## 2019-09-24 ENCOUNTER — DOCUMENTATION (OUTPATIENT)
Dept: NEUROLOGY | Facility: CLINIC | Age: 29
End: 2019-09-24

## 2019-09-24 NOTE — PROGRESS NOTES
Received 2nd request for medical records from [facility]  Faxed request to [facility] on 9/24

## 2020-07-06 ENCOUNTER — EVALUATION (OUTPATIENT)
Dept: PHYSICAL THERAPY | Age: 30
End: 2020-07-06
Payer: COMMERCIAL

## 2020-07-06 DIAGNOSIS — M54.16 LUMBAR RADICULOPATHY: Primary | ICD-10-CM

## 2020-07-06 PROCEDURE — 97162 PT EVAL MOD COMPLEX 30 MIN: CPT | Performed by: PHYSICAL THERAPIST

## 2020-07-06 PROCEDURE — G0283 ELEC STIM OTHER THAN WOUND: HCPCS | Performed by: PHYSICAL THERAPIST

## 2020-07-06 PROCEDURE — 97140 MANUAL THERAPY 1/> REGIONS: CPT | Performed by: PHYSICAL THERAPIST

## 2020-07-06 PROCEDURE — 97014 ELECTRIC STIMULATION THERAPY: CPT | Performed by: PHYSICAL THERAPIST

## 2020-07-06 PROCEDURE — 97110 THERAPEUTIC EXERCISES: CPT | Performed by: PHYSICAL THERAPIST

## 2020-07-06 NOTE — PROGRESS NOTES
PT Evaluation     Today's date: 2020  Patient name: Gema Ambrocio  : 1990  MRN: [id number]  Referring provider: Ashley Betancur DO  Dx:   Encounter Diagnosis     ICD-10-CM    1  Lumbar radiculopathy M54 16        Start Time: 1300  Stop Time: 1400  Total time in clinic (min): 60 minutes    Assessment  Assessment details: Gema Ambrocio is a 30 y o  female who presents with pain, decreased strength, decreased ROM, ambulatory dysfunction and postural  dysfunction  Due to these impairments, Patient has difficulty performing a/iadls  Patient's clinical presentation is consistent with their referring diagnosis of lumbar  radiculopathy  Patient would benefit from skilled physical therapy to address their aforementioned impairments, improve their level of function and to improve their overall quality of life  Impairments: abnormal gait, abnormal or restricted ROM, abnormal movement, activity intolerance, impaired balance, impaired physical strength, lacks appropriate home exercise program, pain with function, weight-bearing intolerance, poor posture  and poor body mechanics  Understanding of Dx/Px/POC: good  Goals  ST-3 WEEKS  1  Decrease pain by 2 points on VAS at its worst  And eliminate leg pain  2  Increase ROM by > 5 deg in all deficients planes  3   Increase CORE/LE by 1/2 MMT grade in all deficient planes  LT-6 WEEKS  1  Patient to be independent with a/iadls and increase sitting and standing tolerance  2  Increase functional activities for leisure and home activities to previous LOF    3  Independent with HEP and/or fitness program     Plan  Patient would benefit from: skilled physical therapy  Planned modality interventions: cryotherapy, electrical stimulation/Russian stimulation, thermotherapy: hydrocollator packs and unattended electrical stimulation  Planned therapy interventions: activity modification, behavior modification, body mechanics training, aquatic therapy, flexibility, functional ROM exercises, home exercise program, IADL retraining, joint mobilization, manual therapy, neuromuscular re-education, patient education, postural training, strengthening, stretching, therapeutic activities and therapeutic exercise  Frequency: 2x week (2-3x week)  Duration in weeks: 12  Plan of Care beginning date: 2020  Plan of Care expiration date: 10/6/2020  Treatment plan discussed with: patient        Subjective Evaluation    History of Present Illness  Date of onset: 2020  Mechanism of injury: LBP secondary to unknown, had an injection to right ankle with some relief but still  complains of back pain with right radiculopathy          Not a recurrent problem   Quality of life: good    Pain  Current pain ratin  At best pain ratin  At worst pain ratin  Quality: burning, cramping and radiating  Relieving factors: change in position, medications and heat  Aggravating factors: sitting and standing  Progression: no change    Social Support  Steps to enter house: no  Stairs in house: no   Lives in: [facility]  Lives with: young children and spouse    Treatments  Previous treatment: chiropractic, injection treatment and physical therapy  Patient Goals  Patient goals for therapy: decreased pain, increased motion, increased strength and independence with ADLs/IADLs          Objective     Static Posture     Thoracic Spine  Hyperkyphosis  Lumbar Spine   Increased lordosis  Palpation   Left   Hypertonic in the erector spinae and lumbar paraspinals  Right   Hypertonic in the erector spinae and lumbar paraspinals  Tenderness of the erector spinae  Tenderness     Lumbar Spine  Tenderness in the facet joint  Left Hip   Tenderness in the PSIS  Right Hip   Tenderness in the PSIS       Active Range of Motion     Lumbar   Flexion: 45 degrees   Extension: 25 degrees     Strength/Myotome Testing     Left Hip   Planes of Motion   Flexion: 4+  Extension: 4+  Abduction: 4+  Adduction: 4+    Right Hip   Planes of Motion   Flexion: 4-  Extension: 4-  Abduction: 4  Adduction: 4+    Left Knee   Flexion: 4+  Extension: 4+    Right Knee   Flexion: 4  Extension: 4    Left Ankle/Foot   Dorsiflexion: 4+  Plantar flexion: 4+    Right Ankle/Foot   Dorsiflexion: 4  Plantar flexion: 4    Additional Strength Details  CORE is 3+/5    Tests     Lumbar     Right   Positive passive SLR, quadrant and slump test      Ambulation     Observational Gait   Gait: antalgic   Decreased walking speed and stride length       Functional Assessment        Single Leg Stance   Left: 11 seconds  Right: 1 seconds      Flowsheet Rows      Most Recent Value   PT/OT G-Codes   Current Score  1   Projected Score  45   Assessment Type  Evaluation   G code set  Mobility: Walking & Moving Around   Mobility: Walking and Moving Around Current Status ()  CJ   Mobility: Walking and Moving Around Goal Status ()  CI             Precautions: TBI, neck pain      Manuals 7/6                         MT to  LB/LE 15            Right leg pull                          Neuro Re-Ed                                                                                                        Ther Ex             Ball exs 30x            slant 30"/4x            Hip add 30/30            Hip abd 30/30                                                                Ther Activity                                       Gait Training                                       Modalities             MH/ESTIM 10

## 2020-07-06 NOTE — LETTER
2020    Veronica Colmenares DO  [address]    Patient: Macarena Blair   YOB: 1990   Date of Visit: 2020     Encounter Diagnosis     ICD-10-CM    1  Lumbar radiculopathy M54 16        Dear Dr Julieth Romo: Thank you for your recent referral of Macarena Blair  Please review the attached evaluation summary from Meagan's recent visit  Please verify that you agree with the plan of care by signing the attached order  If you have any questions or concerns, please do not hesitate to call  I sincerely appreciate the opportunity to share in the care of one of your patients and hope to have another opportunity to work with you in the near future  Sincerely,    Triny Vu, PT      Referring Provider:      I certify that I have read the below Plan of Care and certify the need for these services furnished under this plan of treatment while under my care  Veronica Colmenares DO  [address] [place] [address]  VIA Facsimile: [phone]          PT Evaluation     Today's date: 2020  Patient name: Macarena Blair  : 1990  MRN: [id number]  Referring provider: Eugenio Theodore DO  Dx:   Encounter Diagnosis     ICD-10-CM    1  Lumbar radiculopathy M54 16        Start Time: 1300  Stop Time: 1400  Total time in clinic (min): 60 minutes    Assessment  Assessment details: Macarena Blair is a 30 y o  female who presents with pain, decreased strength, decreased ROM, ambulatory dysfunction and postural  dysfunction  Due to these impairments, Patient has difficulty performing a/iadls  Patient's clinical presentation is consistent with their referring diagnosis of lumbar  radiculopathy  Patient would benefit from skilled physical therapy to address their aforementioned impairments, improve their level of function and to improve their overall quality of life    Impairments: abnormal gait, abnormal or restricted ROM, abnormal movement, activity intolerance, impaired balance, impaired physical strength, lacks appropriate home exercise program, pain with function, weight-bearing intolerance, poor posture  and poor body mechanics  Understanding of Dx/Px/POC: good  Goals  ST-3 WEEKS  1  Decrease pain by 2 points on VAS at its worst  And eliminate leg pain  2  Increase ROM by > 5 deg in all deficients planes  3   Increase CORE/LE by 1/2 MMT grade in all deficient planes  LT-6 WEEKS  1  Patient to be independent with a/iadls and increase sitting and standing tolerance  2  Increase functional activities for leisure and home activities to previous LOF    3  Independent with HEP and/or fitness program     Plan  Patient would benefit from: skilled physical therapy  Planned modality interventions: cryotherapy, electrical stimulation/Russian stimulation, thermotherapy: hydrocollator packs and unattended electrical stimulation  Planned therapy interventions: activity modification, behavior modification, body mechanics training, aquatic therapy, flexibility, functional ROM exercises, home exercise program, IADL retraining, joint mobilization, manual therapy, neuromuscular re-education, patient education, postural training, strengthening, stretching, therapeutic activities and therapeutic exercise  Frequency: 2x week (2-3x week)  Duration in weeks: 12  Plan of Care beginning date: 2020  Plan of Care expiration date: 10/6/2020  Treatment plan discussed with: patient        Subjective Evaluation    History of Present Illness  Date of onset: 2020  Mechanism of injury: LBP secondary to unknown, had an injection to right ankle with some relief but still  complains of back pain with right radiculopathy          Not a recurrent problem   Quality of life: good    Pain  Current pain ratin  At best pain ratin  At worst pain ratin  Quality: burning, cramping and radiating  Relieving factors: change in position, medications and heat  Aggravating factors: sitting and standing  Progression: no change    Social Support  Steps to enter house: no  Stairs in house: no   Lives in: [place] [facility]  Lives with: young children and spouse    Treatments  Previous treatment: chiropractic, injection treatment and physical therapy  Patient Goals  Patient goals for therapy: decreased pain, increased motion, increased strength and independence with ADLs/IADLs          Objective     Static Posture     Thoracic Spine  Hyperkyphosis  Lumbar Spine   Increased lordosis  Palpation   Left   Hypertonic in the erector spinae and lumbar paraspinals  Right   Hypertonic in the erector spinae and lumbar paraspinals  Tenderness of the erector spinae  Tenderness     Lumbar Spine  Tenderness in the facet joint  Left Hip   Tenderness in the PSIS  Right Hip   Tenderness in the PSIS  Active Range of Motion     Lumbar   Flexion: 45 degrees   Extension: 25 degrees     Strength/Myotome Testing     Left Hip   Planes of Motion   Flexion: 4+  Extension: 4+  Abduction: 4+  Adduction: 4+    Right Hip   Planes of Motion   Flexion: 4-  Extension: 4-  Abduction: 4  Adduction: 4+    Left Knee   Flexion: 4+  Extension: 4+    Right Knee   Flexion: 4  Extension: 4    Left Ankle/Foot   Dorsiflexion: 4+  Plantar flexion: 4+    Right Ankle/Foot   Dorsiflexion: 4  Plantar flexion: 4    Additional Strength Details  CORE is 3+/5    Tests     Lumbar     Right   Positive passive SLR, quadrant and slump test      Ambulation     Observational Gait   Gait: antalgic   Decreased walking speed and stride length       Functional Assessment        Single Leg Stance   Left: 11 seconds  Right: 1 seconds      Flowsheet Rows      Most Recent Value   PT/OT G-Codes   Current Score  1   Projected Score  45   Assessment Type  Evaluation   G code set  Mobility: Walking & Moving Around   Mobility: Walking and Moving Around Current Status ()  CJ   Mobility: Walking and Moving Around Goal Status ()  CI             Precautions: TBI, neck pain      Manuals 7/6                         MT to  LB/LE 15            Right leg pull                          Neuro Re-Ed                                                                                                        Ther Ex             Ball exs 30x            slant 30"/4x            Hip add 30/30            Hip abd 30/30                                                                Ther Activity                                       Gait Training                                       Modalities             MH/ESTIM 10

## 2020-07-06 NOTE — LETTER
2020    Bhanu Cortes   [address]    Patient: Kenya David   YOB: 1990   Date of Visit: 2020     Encounter Diagnosis     ICD-10-CM    1  Lumbar radiculopathy M54 16        Dear Dr Brenden Lee: Thank you for your recent referral of Kenya David  Please review the attached evaluation summary from Meagan's recent visit  Please verify that you agree with the plan of care by signing the attached order  If you have any questions or concerns, please do not hesitate to call  I sincerely appreciate the opportunity to share in the care of one of your patients and hope to have another opportunity to work with you in the near future  Sincerely,    Nancy Patrick, PT      Referring Provider:      I certify that I have read the below Plan of Care and certify the need for these services furnished under this plan of treatment while under my care  Bhanu Kolb   [address] [place] [address]  VIA Facsimile: [phone]          PT Evaluation     Today's date: 2020  Patient name: Kenya David  : 1990  MRN: [id number]  Referring provider: Arnav Alvarado DO  Dx:   Encounter Diagnosis     ICD-10-CM    1  Lumbar radiculopathy M54 16        Start Time: 1300  Stop Time: 1400  Total time in clinic (min): 60 minutes    Assessment  Assessment details: Kenya David is a 30 y o  female who presents with pain, decreased strength, decreased ROM, ambulatory dysfunction and postural  dysfunction  Due to these impairments, Patient has difficulty performing a/iadls  Patient's clinical presentation is consistent with their referring diagnosis of lumbar  radiculopathy  Patient would benefit from skilled physical therapy to address their aforementioned impairments, improve their level of function and to improve their overall quality of life    Impairments: abnormal gait, abnormal or restricted ROM, abnormal movement, activity intolerance, impaired balance, impaired physical strength, lacks appropriate home exercise program, pain with function, weight-bearing intolerance, poor posture  and poor body mechanics  Understanding of Dx/Px/POC: good  Goals  ST-3 WEEKS  1  Decrease pain by 2 points on VAS at its worst  And eliminate leg pain  2  Increase ROM by > 5 deg in all deficients planes  3   Increase CORE/LE by 1/2 MMT grade in all deficient planes  LT-6 WEEKS  1  Patient to be independent with a/iadls and increase sitting and standing tolerance  2  Increase functional activities for leisure and home activities to previous LOF    3  Independent with HEP and/or fitness program     Plan  Patient would benefit from: skilled physical therapy  Planned modality interventions: cryotherapy, electrical stimulation/Russian stimulation, thermotherapy: hydrocollator packs and unattended electrical stimulation  Planned therapy interventions: activity modification, behavior modification, body mechanics training, aquatic therapy, flexibility, functional ROM exercises, home exercise program, IADL retraining, joint mobilization, manual therapy, neuromuscular re-education, patient education, postural training, strengthening, stretching, therapeutic activities and therapeutic exercise  Frequency: 2x week (2-3x week)  Duration in weeks: 12  Plan of Care beginning date: 2020  Plan of Care expiration date: 10/6/2020  Treatment plan discussed with: patient        Subjective Evaluation    History of Present Illness  Date of onset: 2020  Mechanism of injury: LBP secondary to unknown, had an injection to right ankle with some relief but still  complains of back pain with right radiculopathy          Not a recurrent problem   Quality of life: good    Pain  Current pain ratin  At best pain ratin  At worst pain ratin  Quality: burning, cramping and radiating  Relieving factors: change in position, medications and heat  Aggravating factors: sitting and standing  Progression: no change    Social Support  Steps to enter house: no  Stairs in house: no   Lives in: [place] house  Lives with: young children and spouse    Treatments  Previous treatment: chiropractic, injection treatment and physical therapy  Patient Goals  Patient goals for therapy: decreased pain, increased motion, increased strength and independence with ADLs/IADLs          Objective     Static Posture     Thoracic Spine  Hyperkyphosis  Lumbar Spine   Increased lordosis  Palpation   Left   Hypertonic in the erector spinae and lumbar paraspinals  Right   Hypertonic in the erector spinae and lumbar paraspinals  Tenderness of the erector spinae  Tenderness     Lumbar Spine  Tenderness in the facet joint  Left Hip   Tenderness in the PSIS  Right Hip   Tenderness in the PSIS  Active Range of Motion     Lumbar   Flexion: 45 degrees   Extension: 25 degrees     Strength/Myotome Testing     Left Hip   Planes of Motion   Flexion: 4+  Extension: 4+  Abduction: 4+  Adduction: 4+    Right Hip   Planes of Motion   Flexion: 4-  Extension: 4-  Abduction: 4  Adduction: 4+    Left Knee   Flexion: 4+  Extension: 4+    Right Knee   Flexion: 4  Extension: 4    Left Ankle/Foot   Dorsiflexion: 4+  Plantar flexion: 4+    Right Ankle/Foot   Dorsiflexion: 4  Plantar flexion: 4    Additional Strength Details  CORE is 3+/5    Tests     Lumbar     Right   Positive passive SLR, quadrant and slump test      Ambulation     Observational Gait   Gait: antalgic   Decreased walking speed and stride length       Functional Assessment        Single Leg Stance   Left: 11 seconds  Right: 1 seconds      Flowsheet Rows      Most Recent Value   PT/OT G-Codes   Current Score  1   Projected Score  45   Assessment Type  Evaluation   G code set  Mobility: Walking & Moving Around   Mobility: Walking and Moving Around Current Status ()  CJ   Mobility: Walking and Moving Around Goal Status ()  CI             Precautions: TBI, neck pain      Manuals 7/6                         MT to  LB/LE 15            Right leg pull                          Neuro Re-Ed                                                                                                        Ther Ex             Ball exs 30x            slant 30"/4x            Hip add 30/30            Hip abd 30/30                                                                Ther Activity                                       Gait Training                                       Modalities             MH/ESTIM 10

## 2020-07-08 ENCOUNTER — TRANSCRIBE ORDERS (OUTPATIENT)
Dept: PHYSICAL THERAPY | Age: 30
End: 2020-07-08

## 2020-07-08 DIAGNOSIS — M54.16 LUMBAR RADICULOPATHY: Primary | ICD-10-CM

## 2020-07-10 ENCOUNTER — OFFICE VISIT (OUTPATIENT)
Dept: PHYSICAL THERAPY | Age: 30
End: 2020-07-10
Payer: COMMERCIAL

## 2020-07-10 DIAGNOSIS — M54.16 LUMBAR RADICULOPATHY: Primary | ICD-10-CM

## 2020-07-10 PROCEDURE — 97014 ELECTRIC STIMULATION THERAPY: CPT

## 2020-07-10 PROCEDURE — G0283 ELEC STIM OTHER THAN WOUND: HCPCS

## 2020-07-10 PROCEDURE — 97110 THERAPEUTIC EXERCISES: CPT

## 2020-07-10 NOTE — PROGRESS NOTES
Daily Note     Today's date: 7/10/2020  Patient name: Susan Mattson  : 1990  MRN: [id number]  Referring provider: Renuka Duenas DO  Dx:   Encounter Diagnosis     ICD-10-CM    1  Lumbar radiculopathy M54 16        Start Time: 0900  Stop Time: 45  Total time in clinic (min): 49 minutes    Subjective: Reports continued high level of pain at her LB and down RLE to toes  Objective: See treatment diary below      Assessment: Tolerated treatment fair  Continues with LB and RLE pain  Minimal relief with stretching noted, patient did feel more limber  C/o burning at [facility] post session  Patient would benefit from continued PT      Plan: Continue per plan of care        Precautions: TBI, neck pain      Manuals  7/10                        MT to  LB/LE 15 15           Right leg pull  5                        Neuro Re-Ed                                                                                                        Ther Ex             Abdominal bracing   5''x20           Ball exs 30x 30x           slant 30"/4x 30''x4           Hip add 30/30 30/30           Hip abd 30/30 30/30                                                               Ther Activity                                       Gait Training                                       Modalities             MH/ESTIM 10 10'  prone

## 2020-07-13 ENCOUNTER — OFFICE VISIT (OUTPATIENT)
Dept: PHYSICAL THERAPY | Age: 30
End: 2020-07-13
Payer: COMMERCIAL

## 2020-07-13 DIAGNOSIS — M54.16 LUMBAR RADICULOPATHY: Primary | ICD-10-CM

## 2020-07-13 PROCEDURE — 97110 THERAPEUTIC EXERCISES: CPT | Performed by: PHYSICAL THERAPIST

## 2020-07-13 PROCEDURE — G0283 ELEC STIM OTHER THAN WOUND: HCPCS | Performed by: PHYSICAL THERAPIST

## 2020-07-13 PROCEDURE — 97140 MANUAL THERAPY 1/> REGIONS: CPT | Performed by: PHYSICAL THERAPIST

## 2020-07-13 PROCEDURE — 97014 ELECTRIC STIMULATION THERAPY: CPT | Performed by: PHYSICAL THERAPIST

## 2020-07-13 NOTE — PROGRESS NOTES
Daily Note     Today's date: 2020  Patient name: Juju Chen  : 1990  MRN: [id number]  Referring provider: Louis Villarreal DO  Dx:   Encounter Diagnosis     ICD-10-CM    1  Lumbar radiculopathy M54 16        Start Time: 1700  Stop Time: 1800  Total time in clinic (min): 60 minutes    Subjective: Patient reports right low back pain with radiculopathy today, and reports MD recheck with Dr Sandrita Mills is this week      Objective: See treatment diary below      Assessment: Tolerated treatment well  Patient demonstrated fatigue post treatment, exhibited good technique with therapeutic exercises and would benefit from continued PT, still with decreased CORE/Back stabilization strength and increased lordosis posture, still with complaints of right radiculopathy modified program today secondary to pain      Plan: Progress treatment as tolerated         Precautions: TBI, neck pain      Manuals 7/6 7/10 7/13                       MT to  LB/LE 15 15 15          Right leg pull  5 5                       Neuro Re-Ed                                                                                                        Ther Ex             Abdominal bracing   5''x20 20x          Ball exs 30x 30x 30x          slant 30"/4x 30''x4 4x          Hip add 30/30 30/30 30/30          Hip abd 30/30 30/30 30/30                                                              Ther Activity                                       Gait Training                                       Modalities             MH/ESTIM 10 10'  prone 10

## 2020-07-15 ENCOUNTER — APPOINTMENT (OUTPATIENT)
Dept: PHYSICAL THERAPY | Age: 30
End: 2020-07-15
Payer: COMMERCIAL

## 2020-07-20 ENCOUNTER — OFFICE VISIT (OUTPATIENT)
Dept: PHYSICAL THERAPY | Age: 30
End: 2020-07-20
Payer: COMMERCIAL

## 2020-07-20 DIAGNOSIS — M54.16 LUMBAR RADICULOPATHY: Primary | ICD-10-CM

## 2020-07-20 PROCEDURE — 97110 THERAPEUTIC EXERCISES: CPT | Performed by: PHYSICAL THERAPIST

## 2020-07-20 PROCEDURE — G0283 ELEC STIM OTHER THAN WOUND: HCPCS | Performed by: PHYSICAL THERAPIST

## 2020-07-20 PROCEDURE — 97014 ELECTRIC STIMULATION THERAPY: CPT | Performed by: PHYSICAL THERAPIST

## 2020-07-20 PROCEDURE — 97140 MANUAL THERAPY 1/> REGIONS: CPT | Performed by: PHYSICAL THERAPIST

## 2020-07-20 NOTE — PROGRESS NOTES
Daily Note     Today's date: 2020  Patient name: Krystin Jo  : 1990  MRN: [id number]  Referring provider: Jesse Mayorga DO  Dx:   Encounter Diagnosis     ICD-10-CM    1  Lumbar radiculopathy M54 16        Start Time: 163  Stop Time:   Total time in clinic (min): 60 minutes    Subjective: Same      Objective: See treatment diary below      Assessment: Tolerated treatment fair  Patient demonstrated fatigue post treatment, exhibited good technique with therapeutic exercises and would benefit from continued PT, patient had some relief post treatment      Plan: Progress treatment as tolerated         Precautions: TBI, neck pain      Manuals 7/6 7/10 7/13 7/20                      MT to  LB/LE 15 15 15 15         Right leg pull  5 5 5                      Neuro Re-Ed                                                                                                        Ther Ex             Abdominal bracing   5''x20 20x 30x         Ball exs 30x 30x 30x 30x         slant 30"/4x 30''x4 4x 4x         Hip add 30/30 30/30 30/30 35/30         Hip abd 30/30 30/30 30/30 35/30                                                             Ther Activity                                       Gait Training                                       Modalities             MH/ESTIM 10 10'  prone 10 10

## 2020-07-23 ENCOUNTER — OFFICE VISIT (OUTPATIENT)
Dept: PHYSICAL THERAPY | Age: 30
End: 2020-07-23
Payer: COMMERCIAL

## 2020-07-23 DIAGNOSIS — M54.16 LUMBAR RADICULOPATHY: Primary | ICD-10-CM

## 2020-07-23 PROCEDURE — 97140 MANUAL THERAPY 1/> REGIONS: CPT | Performed by: PHYSICAL THERAPIST

## 2020-07-23 PROCEDURE — 97014 ELECTRIC STIMULATION THERAPY: CPT | Performed by: PHYSICAL THERAPIST

## 2020-07-23 PROCEDURE — 97110 THERAPEUTIC EXERCISES: CPT | Performed by: PHYSICAL THERAPIST

## 2020-07-23 PROCEDURE — G0283 ELEC STIM OTHER THAN WOUND: HCPCS | Performed by: PHYSICAL THERAPIST

## 2020-07-23 NOTE — PROGRESS NOTES
Daily Note     Today's date: 2020  Patient name: Paddy Banks  : 1990  MRN: [id number]  Referring provider: Adrien Ken DO  Dx:   Encounter Diagnosis     ICD-10-CM    1  Lumbar radiculopathy M54 16        Start Time: 1500  Stop Time: 1600  Total time in clinic (min): 60 minutes    Subjective: unremarkable      Objective: See treatment diary below      Assessment: Tolerated treatment well  Patient demonstrated fatigue post treatment, exhibited good technique with therapeutic exercises and would benefit from continued PT, still with subjective complaints of pain      Plan: Progress treatment as tolerated         Precautions: TBI, neck pain      Manuals 7/6 7/10 7/13 7/20 7/23                     MT to  LB/LE 15 15 15 15 15        Right leg pull  5 5 5 5                     Neuro Re-Ed                                                                                                        Ther Ex             Abdominal bracing   5''x20 20x 30x 30x        Ball exs 30x 30x 30x 30x 30x        slant 30"/4x 30''x4 4x 4x 4x        Hip add 30/30 30/30 30/30 35/30 35/30        Hip abd 30/30 30/30 30/30 35/30 35/30                                                            Ther Activity                                       Gait Training                                       Modalities             MH/ESTIM 10 10'  prone 10 10 10

## 2020-07-27 ENCOUNTER — OFFICE VISIT (OUTPATIENT)
Dept: PHYSICAL THERAPY | Age: 30
End: 2020-07-27
Payer: COMMERCIAL

## 2020-07-27 DIAGNOSIS — M54.16 LUMBAR RADICULOPATHY: Primary | ICD-10-CM

## 2020-07-27 PROCEDURE — 97110 THERAPEUTIC EXERCISES: CPT | Performed by: PHYSICAL THERAPIST

## 2020-07-27 PROCEDURE — 97014 ELECTRIC STIMULATION THERAPY: CPT | Performed by: PHYSICAL THERAPIST

## 2020-07-27 PROCEDURE — 97140 MANUAL THERAPY 1/> REGIONS: CPT | Performed by: PHYSICAL THERAPIST

## 2020-07-27 PROCEDURE — G0283 ELEC STIM OTHER THAN WOUND: HCPCS | Performed by: PHYSICAL THERAPIST

## 2020-07-27 NOTE — PROGRESS NOTES
Daily Note     Today's date: 2020  Patient name: Nhung Jeffrey  : 1990  MRN: [id number]  Referring provider: Guero Hager DO  Dx:   Encounter Diagnosis     ICD-10-CM    1  Lumbar radiculopathy M54 16        Start Time: 1300  Stop Time: 1400  Total time in clinic (min): 60 minutes    Subjective: same      Objective: See treatment diary below      Assessment: Tolerated treatment fair  Patient demonstrated fatigue post treatment, exhibited good technique with therapeutic exercises and would benefit from continued PT, tender to right SI joint, still with right neural tension, also pain with core exs today, MRI results today with MD      Plan: Progress treatment as tolerated         Precautions: TBI, neck pain      Manuals 7/6 7/10 7/13 7/20 7/23 7/27                    MT to  LB/LE 15 15 15 15 15 15       Right leg pull  5 5 5 5 5                    Neuro Re-Ed                                                                                                        Ther Ex             Abdominal bracing   5''x20 20x 30x 30x 30x       Ball exs 30x 30x 30x 30x 30x 30x       slant 30"/4x 30''x4 4x 4x 4x 4x       Hip add 30/30 30/30 30/30 35/30 35/30 35#/30       Hip abd 30/30 30/30 30/30 35/30 35/30 35#/30                                                           Ther Activity                                       Gait Training                                       Modalities             MH/ESTIM 10 10'  prone 10 10 10 10

## 2020-07-31 ENCOUNTER — OFFICE VISIT (OUTPATIENT)
Dept: PHYSICAL THERAPY | Age: 30
End: 2020-07-31
Payer: COMMERCIAL

## 2020-07-31 DIAGNOSIS — M54.16 LUMBAR RADICULOPATHY: Primary | ICD-10-CM

## 2020-07-31 PROCEDURE — 97110 THERAPEUTIC EXERCISES: CPT | Performed by: PHYSICAL THERAPIST

## 2020-07-31 PROCEDURE — 97014 ELECTRIC STIMULATION THERAPY: CPT | Performed by: PHYSICAL THERAPIST

## 2020-07-31 PROCEDURE — 97140 MANUAL THERAPY 1/> REGIONS: CPT | Performed by: PHYSICAL THERAPIST

## 2020-07-31 PROCEDURE — G0283 ELEC STIM OTHER THAN WOUND: HCPCS | Performed by: PHYSICAL THERAPIST

## 2020-07-31 NOTE — PROGRESS NOTES
Daily Note     Today's date: 2020  Patient name: Gema Ambrocio  : 1990  MRN: [id number]  Referring provider: Ashley Betancur DO  Dx:   Encounter Diagnosis     ICD-10-CM    1  Lumbar radiculopathy M54 16        Start Time: 161  Stop Time: 1700  Total time in clinic (min): 45 minutes    Subjective: Patient reports MD said I have spinal stenosis and facet joint arthritis and a bulge      Objective: See treatment diary below      Assessment: Tolerated treatment fair  Patient demonstrated fatigue post treatment, exhibited good technique with therapeutic exercises and would benefit from continued PT, still with decreased CORE strength , no major changes  But some relief with leg pulls      Plan: Progress treatment as tolerated         Precautions: TBI, neck pain      Manuals 7/6 7/10 7/13 7/20 7/23 7/27 7/31                   MT to  LB/LE 15 15 15 15 15 15 15      Right leg pull  5 5 5 5 5 5                   Neuro Re-Ed                                                                                                        Ther Ex             Abdominal bracing   5''x20 20x 30x 30x 30x 30x      Ball exs 30x 30x 30x 30x 30x 30x 30x      slant 30"/4x 30''x4 4x 4x 4x 4x 4x      Hip add 30/30 30/30 30/30 35/30 35/30 35#/30 35/30      Hip abd 30/30 30/30 30/30 35/30 35/30 35#/30 35/30                                                          Ther Activity                                       Gait Training                                       Modalities             MH/ESTIM 10 10'  prone 10 10 10 10 10

## 2020-08-03 ENCOUNTER — OFFICE VISIT (OUTPATIENT)
Dept: PHYSICAL THERAPY | Age: 30
End: 2020-08-03
Payer: COMMERCIAL

## 2020-08-03 DIAGNOSIS — M54.16 LUMBAR RADICULOPATHY: Primary | ICD-10-CM

## 2020-08-03 PROCEDURE — 97110 THERAPEUTIC EXERCISES: CPT | Performed by: PHYSICAL THERAPIST

## 2020-08-03 PROCEDURE — 97140 MANUAL THERAPY 1/> REGIONS: CPT | Performed by: PHYSICAL THERAPIST

## 2020-08-03 PROCEDURE — G0283 ELEC STIM OTHER THAN WOUND: HCPCS | Performed by: PHYSICAL THERAPIST

## 2020-08-03 PROCEDURE — 97014 ELECTRIC STIMULATION THERAPY: CPT | Performed by: PHYSICAL THERAPIST

## 2020-08-03 NOTE — PROGRESS NOTES
Daily Note     Today's date: 8/3/2020  Patient name: Shalonda Lerma  : 1990  MRN: [id number]  Referring provider: Sj Collazo DO  Dx:   Encounter Diagnosis     ICD-10-CM    1  Lumbar radiculopathy  M54 16        Start Time:   Stop Time: 146  Total time in clinic (min): 45 minutes    Subjective: Patient complains of low back achiness        Objective: See treatment diary below      Assessment: Tolerated treatment well  Patient demonstrated fatigue post treatment, exhibited good technique with therapeutic exercises and would benefit from continued PT, still with complains of pain and core weakness      Plan: Progress treatment as tolerated         Precautions: TBI, neck pain      Manuals 7/6 7/10 7/13 7/20 7/23 7/27 7/31 8/3                  MT to  LB/LE 15 15 15 15 15 15 15 15     Right leg pull  5 5 5 5 5 5 5                  Neuro Re-Ed                                                                                                        Ther Ex             Abdominal bracing   5''x20 20x 30x 30x 30x 30x 30x     Ball exs 30x 30x 30x 30x 30x 30x 30x 30x     slant 30"/4x 30''x4 4x 4x 4x 4x 4x 4x     Hip add 30/30 30/30 30/30 35/30 35/30 35#/30 35/30 35/30     Hip abd 30/30 30/30 30/30 35/30 35/30 35#/30 35/30 35/30                                                         Ther Activity                                       Gait Training                                       Modalities             MH/ESTIM 10 10'  prone 10 10 10 10 10 10

## 2020-08-07 ENCOUNTER — APPOINTMENT (OUTPATIENT)
Dept: PHYSICAL THERAPY | Age: 30
End: 2020-08-07
Payer: COMMERCIAL

## 2020-08-10 ENCOUNTER — TRANSCRIBE ORDERS (OUTPATIENT)
Dept: PHYSICAL THERAPY | Age: 30
End: 2020-08-10

## 2020-08-10 ENCOUNTER — OFFICE VISIT (OUTPATIENT)
Dept: PHYSICAL THERAPY | Age: 30
End: 2020-08-10
Payer: COMMERCIAL

## 2020-08-10 DIAGNOSIS — M54.16 LUMBAR RADICULOPATHY: Primary | ICD-10-CM

## 2020-08-10 PROCEDURE — 97140 MANUAL THERAPY 1/> REGIONS: CPT | Performed by: PHYSICAL THERAPIST

## 2020-08-10 PROCEDURE — G0283 ELEC STIM OTHER THAN WOUND: HCPCS | Performed by: PHYSICAL THERAPIST

## 2020-08-10 PROCEDURE — 97014 ELECTRIC STIMULATION THERAPY: CPT | Performed by: PHYSICAL THERAPIST

## 2020-08-10 PROCEDURE — 97110 THERAPEUTIC EXERCISES: CPT | Performed by: PHYSICAL THERAPIST

## 2020-08-10 NOTE — PROGRESS NOTES
DISCHARGE    Today's date: 8/10/2020  Patient name: Lizzy Shelley  : 1990  MRN: [id number]  Referring provider: Gary Paulson DO  Dx:   Encounter Diagnosis     ICD-10-CM    1  Lumbar radiculopathy  M54 16        Start Time: 1400  Stop Time: 1500  Total time in clinic (min): 60 minutes    Subjective: same      Objective: See treatment diary below      Assessment: Tolerated treatment fair  Patient exhibited good technique with therapeutic exercises  At this time, patient has achieved their maximum functional benefit from skilled physical therapy services and will be discharged to their [facility]  Patient is in agreement with the plan of care  As a result, patient is discharged from physical therapy        Plan: D/C to [facility]     Precautions: TBI, neck pain      Manuals 7/6 7/10 7/13 7/20 7/23 7/27 7/31 8/3 8/10                 MT to  LB/LE 15 15 15 15 15 15 15 15 15    Right leg pull  5 5 5 5 5 5 5 5                 Neuro Re-Ed                                                                                                        Ther Ex             Abdominal bracing   5''x20 20x 30x 30x 30x 30x 30x 30x    Ball exs 30x 30x 30x 30x 30x 30x 30x 30x 30x    slant 30"/4x 30''x4 4x 4x 4x 4x 4x 4x 4x    Hip add 30/30 30/30 30/30 35/30 35/30 35#/30 35/30 35/30 35/30    Hip abd 30/30 30/30 30/30 35/30 35/30 35#/30 35/30 35/30 35/30                                                        Ther Activity                                       Gait Training                                       Modalities             MH/ESTIM 10 10'  prone 10 10 10 10 10 10 10

## 2020-08-10 NOTE — LETTER
August 10, 2020    Sujatha Chi DO  [address]    Patient: Shalonda Lerma   YOB: 1990   Date of Visit: 8/10/2020     Encounter Diagnosis     ICD-10-CM    1  Lumbar radiculopathy  M54 16        Dear Dr Forrest Beck: Thank you for your recent referral of Shalonda eLrma  Please review the attached evaluation summary from Meagan's recent visit  Please verify that you agree with the plan of care by signing the attached order  If you have any questions or concerns, please do not hesitate to call  I sincerely appreciate the opportunity to share in the care of one of your patients and hope to have another opportunity to work with you in the near future  Sincerely,    Clyde Fink, PT      Referring Provider:      I certify that I have read the below Plan of Care and certify the need for these services furnished under this plan of treatment while under my care  Sujatha Chi DO  [address] [id number]  VIA Facsimile: [phone]          DISCHARGE    Today's date: 8/10/2020  Patient name: Shalonda Lerma  : 1990  MRN: [id number]  Referring provider: Sj Collazo DO  Dx:   Encounter Diagnosis     ICD-10-CM    1  Lumbar radiculopathy  M54 16        Start Time: 1400  Stop Time: 1500  Total time in clinic (min): 60 minutes    Subjective: same      Objective: See treatment diary below      Assessment: Tolerated treatment fair  Patient exhibited good technique with therapeutic exercises  At this time, patient has achieved their maximum functional benefit from skilled physical therapy services and will be discharged to their [facility]  Patient is in agreement with the plan of care  As a result, patient is discharged from physical therapy        Plan: D/C to [facility]     Precautions: TBI, neck pain      Manuals 7/6 7/10 7/13 7/20 7/23 7/27 7/31 8/3 8/10                 MT to  LB/LE 15 15 15 15 15 15 15 15 15    Right leg pull  5 5 5 5 5 5 5 5                 Neuro Re-Ed                                                                                                        Ther Ex             Abdominal bracing   5''x20 20x 30x 30x 30x 30x 30x 30x    Ball exs 30x 30x 30x 30x 30x 30x 30x 30x 30x    slant 30"/4x 30''x4 4x 4x 4x 4x 4x 4x 4x    Hip add 30/30 30/30 30/30 35/30 35/30 35#/30 35/30 35/30 35/30    Hip abd 30/30 30/30 30/30 35/30 35/30 35#/30 35/30 35/30 35/30                                                        Ther Activity                                       Gait Training                                       Modalities             MH/ESTIM 10 10'  prone 10 10 10 10 10 10 10

## 2020-08-14 ENCOUNTER — APPOINTMENT (OUTPATIENT)
Dept: PHYSICAL THERAPY | Age: 30
End: 2020-08-14
Payer: COMMERCIAL

## 2020-08-17 ENCOUNTER — APPOINTMENT (OUTPATIENT)
Dept: PHYSICAL THERAPY | Age: 30
End: 2020-08-17
Payer: COMMERCIAL

## 2020-08-21 ENCOUNTER — APPOINTMENT (OUTPATIENT)
Dept: PHYSICAL THERAPY | Age: 30
End: 2020-08-21
Payer: COMMERCIAL

## 2021-01-15 ENCOUNTER — IMMUNIZATIONS (OUTPATIENT)
Dept: FAMILY MEDICINE CLINIC | Facility: HOSPITAL | Age: 31
End: 2021-01-15

## 2021-01-15 DIAGNOSIS — Z23 ENCOUNTER FOR IMMUNIZATION: Primary | ICD-10-CM

## 2021-01-15 PROCEDURE — 0011A SARS-COV-2 / COVID-19 MRNA VACCINE (MODERNA) 100 MCG: CPT

## 2021-01-15 PROCEDURE — 91301 SARS-COV-2 / COVID-19 MRNA VACCINE (MODERNA) 100 MCG: CPT

## 2021-12-27 ENCOUNTER — OFFICE VISIT (OUTPATIENT)
Dept: FAMILY MEDICINE CLINIC | Facility: CLINIC | Age: 31
End: 2021-12-27
Payer: COMMERCIAL

## 2021-12-27 VITALS
BODY MASS INDEX: 36.16 KG/M2 | TEMPERATURE: 96.1 F | WEIGHT: 211.8 LBS | SYSTOLIC BLOOD PRESSURE: 120 MMHG | OXYGEN SATURATION: 100 % | HEIGHT: 64 IN | RESPIRATION RATE: 18 BRPM | HEART RATE: 86 BPM | DIASTOLIC BLOOD PRESSURE: 60 MMHG

## 2021-12-27 DIAGNOSIS — Z13.1 SCREENING FOR DIABETES MELLITUS: ICD-10-CM

## 2021-12-27 DIAGNOSIS — Z00.00 ENCOUNTER FOR WELL ADULT EXAM WITHOUT ABNORMAL FINDINGS: Primary | ICD-10-CM

## 2021-12-27 DIAGNOSIS — Z11.4 SCREENING FOR HIV (HUMAN IMMUNODEFICIENCY VIRUS): ICD-10-CM

## 2021-12-27 DIAGNOSIS — Z11.59 NEED FOR HEPATITIS C SCREENING TEST: ICD-10-CM

## 2021-12-27 DIAGNOSIS — R53.82 CHRONIC FATIGUE: ICD-10-CM

## 2021-12-27 PROBLEM — M47.816 FACET ARTHRITIS OF LUMBAR REGION: Status: ACTIVE | Noted: 2021-12-27

## 2021-12-27 PROBLEM — N89.8 VAGINAL DRYNESS: Status: ACTIVE | Noted: 2021-12-27

## 2021-12-27 PROBLEM — G54.0 BRACHIAL PLEXOPATHY: Status: RESOLVED | Noted: 2018-12-24 | Resolved: 2021-12-27

## 2021-12-27 PROBLEM — J30.2 SEASONAL ALLERGIES: Status: ACTIVE | Noted: 2021-12-27

## 2021-12-27 PROBLEM — M54.16 RADICULOPATHY, LUMBAR REGION: Status: RESOLVED | Noted: 2018-12-24 | Resolved: 2021-12-27

## 2021-12-27 PROCEDURE — 3008F BODY MASS INDEX DOCD: CPT | Performed by: NURSE PRACTITIONER

## 2021-12-27 PROCEDURE — 99385 PREV VISIT NEW AGE 18-39: CPT | Performed by: NURSE PRACTITIONER

## 2021-12-27 PROCEDURE — 1036F TOBACCO NON-USER: CPT | Performed by: NURSE PRACTITIONER

## 2021-12-27 PROCEDURE — 3725F SCREEN DEPRESSION PERFORMED: CPT | Performed by: NURSE PRACTITIONER

## 2021-12-27 RX ORDER — CETIRIZINE HYDROCHLORIDE 10 MG/1
CAPSULE, LIQUID FILLED ORAL
COMMUNITY

## 2022-02-14 ENCOUNTER — CLINICAL SUPPORT (OUTPATIENT)
Dept: FAMILY MEDICINE CLINIC | Facility: CLINIC | Age: 32
End: 2022-02-14
Payer: COMMERCIAL

## 2022-02-14 DIAGNOSIS — Z23 ENCOUNTER FOR IMMUNIZATION: Primary | ICD-10-CM

## 2022-02-14 PROCEDURE — 86580 TB INTRADERMAL TEST: CPT | Performed by: NURSE PRACTITIONER

## 2022-02-16 ENCOUNTER — CLINICAL SUPPORT (OUTPATIENT)
Dept: FAMILY MEDICINE CLINIC | Facility: CLINIC | Age: 32
End: 2022-02-16

## 2022-02-16 DIAGNOSIS — Z11.1 ENCOUNTER FOR PPD SKIN TEST READING: Primary | ICD-10-CM

## 2022-02-16 LAB
INDURATION: 0 MM
TB SKIN TEST: NEGATIVE

## 2022-03-01 ENCOUNTER — CLINICAL SUPPORT (OUTPATIENT)
Dept: FAMILY MEDICINE CLINIC | Facility: CLINIC | Age: 32
End: 2022-03-01
Payer: COMMERCIAL

## 2022-03-01 DIAGNOSIS — R76.11 POSITIVE SKIN TEST FOR TUBERCULOSIS: Primary | ICD-10-CM

## 2022-03-01 PROCEDURE — 86580 TB INTRADERMAL TEST: CPT | Performed by: FAMILY MEDICINE

## 2022-04-14 ENCOUNTER — TELEPHONE (OUTPATIENT)
Dept: NEUROLOGY | Facility: CLINIC | Age: 32
End: 2022-04-14

## 2022-04-18 ENCOUNTER — TELEPHONE (OUTPATIENT)
Dept: NEUROLOGY | Facility: CLINIC | Age: 32
End: 2022-04-18

## 2022-04-18 NOTE — TELEPHONE ENCOUNTER
Received medical records request from [address] [place] [address] law offices  Request scanned into chart and faxed to [employer]

## 2022-05-11 ENCOUNTER — OFFICE VISIT (OUTPATIENT)
Dept: FAMILY MEDICINE CLINIC | Facility: CLINIC | Age: 32
End: 2022-05-11
Payer: COMMERCIAL

## 2022-05-11 DIAGNOSIS — R23.2 FACIAL FLUSHING: ICD-10-CM

## 2022-05-11 DIAGNOSIS — J30.2 SEASONAL ALLERGIES: Primary | ICD-10-CM

## 2022-05-11 PROCEDURE — 99213 OFFICE O/P EST LOW 20 MIN: CPT | Performed by: NURSE PRACTITIONER

## 2022-05-11 PROCEDURE — 1036F TOBACCO NON-USER: CPT | Performed by: NURSE PRACTITIONER

## 2022-05-11 RX ORDER — MONTELUKAST SODIUM 10 MG/1
10 TABLET ORAL
Qty: 30 TABLET | Refills: 1 | Status: SHIPPED | OUTPATIENT
Start: 2022-05-11

## 2022-05-11 RX ORDER — ALBUTEROL SULFATE 90 UG/1
2 AEROSOL, METERED RESPIRATORY (INHALATION) EVERY 6 HOURS PRN
Qty: 18 G | Refills: 1 | Status: SHIPPED | OUTPATIENT
Start: 2022-05-11 | End: 2022-06-10

## 2022-05-11 NOTE — PROGRESS NOTES
OFFICE VISIT  Shu Woo 33 y o  female MRN: [id number]          Assessment / Plan:  Problem List Items Addressed This Visit        Other    Seasonal allergies - Primary     Covid test was offered although declined  Will start on singulair, she will call office on Monday with update  Relevant Medications    montelukast (SINGULAIR) 10 mg tablet    albuterol (PROVENTIL HFA,VENTOLIN HFA) 90 mcg/act inhaler    Other Relevant Orders    [facility] Panel, Adult    Facial flushing    Relevant Orders    IgE            Reason For Visit / Chief Complaint  Chief Complaint   Patient presents with    Cold Like Symptoms        HPI:  Shu Woo is a 33 y o  female who presents today for allergy flare-up  She has been using over-the-counter medication with poor relief  She does report in the past having reaction to Zyrtec, including flushing tachycardia  She does have known seasonal allergies  She has been using her son's medication, albuterol  She reports no known history of any asthma    Historical Information   Past Medical History:   Diagnosis Date    Brachial plexopathy     radiculopathy    Concussion     Dizziness and giddiness     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     Past Surgical History:   Procedure Laterality Date     SECTION      x2     Social History   Social History     Substance and Sexual Activity   Alcohol Use Yes    Comment: social     Social History     Substance and Sexual Activity   Drug Use No     Social History     Tobacco Use   Smoking Status Never Smoker   Smokeless Tobacco Never Used     Family History   Problem Relation Age of Onset    Diabetes type II Mother     Deafness Father     Depression Father     Asthma Son        Meds/Allergies   Allergies   Allergen Reactions    Fioricet [Butalbital-Apap-Caffeine] Itching    Fioricet [Butalbital-Apap-Caffeine]        Meds:    Current Outpatient Medications:     albuterol (PROVENTIL HFA,VENTOLIN HFA) 90 mcg/act inhaler, Inhale 2 puffs every 6 (six) hours as needed for wheezing, Disp: 18 g, Rfl: 1    montelukast (SINGULAIR) 10 mg tablet, Take 1 tablet (10 mg total) by mouth daily at bedtime, Disp: 30 tablet, Rfl: 1    Cetirizine HCl (ZyrTEC Allergy) 10 MG CAPS, Zyrtec, Disp: , Rfl:     conjugated estrogens (PREMARIN) vaginal cream, Insert into the vagina daily, Disp: , Rfl:     ibuprofen (MOTRIN) 800 mg tablet, Take 1 tablet by mouth 3 (three) times a day as needed, Disp: , Rfl:       REVIEW OF SYSTEMS  Review of Systems   Constitutional: Negative for activity change, chills, fatigue and fever  HENT: Positive for congestion  Negative for ear discharge, ear pain, sinus pressure, sinus pain, sore throat, tinnitus and trouble swallowing  Eyes: Negative for photophobia, pain, discharge, itching and visual disturbance  Respiratory: Negative for cough, chest tightness, shortness of breath and wheezing  Cardiovascular: Negative for chest pain and leg swelling  Gastrointestinal: Negative for abdominal distention, abdominal pain, constipation, diarrhea, nausea and vomiting  Endocrine: Negative for polydipsia, polyphagia and polyuria  Genitourinary: Negative for dysuria and frequency  Musculoskeletal: Negative for arthralgias, myalgias, neck pain and neck stiffness  Skin: Negative for color change  Neurological: Negative for dizziness, syncope, weakness, numbness and headaches  Hematological: Does not bruise/bleed easily  Psychiatric/Behavioral: Negative for behavioral problems, confusion, self-injury, sleep disturbance and suicidal ideas  The patient is not nervous/anxious  Current Vitals:      [unfilled]    PHYSICAL EXAMS:  Physical Exam  Constitutional:       Appearance: Normal appearance  Pulmonary:      Effort: Pulmonary effort is normal    Neurological:      General: No focal deficit present        Mental Status: She is alert and oriented to person, place, and time  Psychiatric:         Mood and Affect: Mood normal          Behavior: Behavior normal              Lab, imaging and other studies: I have personally reviewed pertinent reports  Emilia Martinez

## 2022-05-12 PROBLEM — R23.2 FACIAL FLUSHING: Status: ACTIVE | Noted: 2022-05-12

## 2022-05-12 NOTE — ASSESSMENT & PLAN NOTE
Covid test was offered although declined  Will start on singulair, she will call office on Monday with update

## 2022-05-16 ENCOUNTER — TELEPHONE (OUTPATIENT)
Dept: FAMILY MEDICINE CLINIC | Facility: CLINIC | Age: 32
End: 2022-05-16

## 2022-05-16 ENCOUNTER — APPOINTMENT (OUTPATIENT)
Dept: LAB | Facility: HOSPITAL | Age: 32
End: 2022-05-16
Payer: COMMERCIAL

## 2022-05-16 DIAGNOSIS — R53.82 CHRONIC FATIGUE: ICD-10-CM

## 2022-05-16 DIAGNOSIS — J30.2 SEASONAL ALLERGIES: ICD-10-CM

## 2022-05-16 DIAGNOSIS — Z11.59 NEED FOR HEPATITIS C SCREENING TEST: ICD-10-CM

## 2022-05-16 DIAGNOSIS — Z11.4 SCREENING FOR HIV (HUMAN IMMUNODEFICIENCY VIRUS): ICD-10-CM

## 2022-05-16 DIAGNOSIS — Z13.1 SCREENING FOR DIABETES MELLITUS: ICD-10-CM

## 2022-05-16 DIAGNOSIS — Z00.00 ENCOUNTER FOR WELL ADULT EXAM WITHOUT ABNORMAL FINDINGS: ICD-10-CM

## 2022-05-16 DIAGNOSIS — J06.9 ACUTE URI: Primary | ICD-10-CM

## 2022-05-16 DIAGNOSIS — R23.2 FACIAL FLUSHING: ICD-10-CM

## 2022-05-16 LAB
25(OH)D3 SERPL-MCNC: 23.8 NG/ML (ref 30–100)
ALBUMIN SERPL BCP-MCNC: 3.9 G/DL (ref 3.5–5)
ALP SERPL-CCNC: 102 U/L (ref 46–116)
ALT SERPL W P-5'-P-CCNC: 20 U/L (ref 12–78)
ANION GAP SERPL CALCULATED.3IONS-SCNC: 9 MMOL/L (ref 4–13)
AST SERPL W P-5'-P-CCNC: 11 U/L (ref 5–45)
BASOPHILS # BLD AUTO: 0.04 THOUSANDS/ΜL (ref 0–0.1)
BASOPHILS NFR BLD AUTO: 0 % (ref 0–1)
BILIRUB SERPL-MCNC: 0.45 MG/DL (ref 0.2–1)
BUN SERPL-MCNC: 9 MG/DL (ref 5–25)
CALCIUM SERPL-MCNC: 8.9 MG/DL (ref 8.3–10.1)
CHLORIDE SERPL-SCNC: 104 MMOL/L (ref 100–108)
CHOLEST SERPL-MCNC: 145 MG/DL
CO2 SERPL-SCNC: 29 MMOL/L (ref 21–32)
CREAT SERPL-MCNC: 0.74 MG/DL (ref 0.6–1.3)
EOSINOPHIL # BLD AUTO: 0.17 THOUSAND/ΜL (ref 0–0.61)
EOSINOPHIL NFR BLD AUTO: 2 % (ref 0–6)
ERYTHROCYTE [DISTWIDTH] IN BLOOD BY AUTOMATED COUNT: 14.1 % (ref 11.6–15.1)
EST. AVERAGE GLUCOSE BLD GHB EST-MCNC: 105 MG/DL
FERRITIN SERPL-MCNC: 24 NG/ML (ref 8–388)
GFR SERPL CREATININE-BSD FRML MDRD: 106 ML/MIN/1.73SQ M
GLUCOSE P FAST SERPL-MCNC: 89 MG/DL (ref 65–99)
HBA1C MFR BLD: 5.3 %
HCT VFR BLD AUTO: 43.4 % (ref 34.8–46.1)
HDLC SERPL-MCNC: 75 MG/DL
HGB BLD-MCNC: 13.8 G/DL (ref 11.5–15.4)
IMM GRANULOCYTES # BLD AUTO: 0.03 THOUSAND/UL (ref 0–0.2)
IMM GRANULOCYTES NFR BLD AUTO: 0 % (ref 0–2)
IRON SATN MFR SERPL: 10 % (ref 15–50)
IRON SERPL-MCNC: 38 UG/DL (ref 50–170)
LDLC SERPL CALC-MCNC: 37 MG/DL (ref 0–100)
LYMPHOCYTES # BLD AUTO: 3.36 THOUSANDS/ΜL (ref 0.6–4.47)
LYMPHOCYTES NFR BLD AUTO: 29 % (ref 14–44)
MCH RBC QN AUTO: 27.7 PG (ref 26.8–34.3)
MCHC RBC AUTO-ENTMCNC: 31.8 G/DL (ref 31.4–37.4)
MCV RBC AUTO: 87 FL (ref 82–98)
MONOCYTES # BLD AUTO: 0.46 THOUSAND/ΜL (ref 0.17–1.22)
MONOCYTES NFR BLD AUTO: 4 % (ref 4–12)
NEUTROPHILS # BLD AUTO: 7.5 THOUSANDS/ΜL (ref 1.85–7.62)
NEUTS SEG NFR BLD AUTO: 65 % (ref 43–75)
NRBC BLD AUTO-RTO: 0 /100 WBCS
PLATELET # BLD AUTO: 454 THOUSANDS/UL (ref 149–390)
PMV BLD AUTO: 9 FL (ref 8.9–12.7)
POTASSIUM SERPL-SCNC: 3.7 MMOL/L (ref 3.5–5.3)
PROT SERPL-MCNC: 8.2 G/DL (ref 6.4–8.2)
RBC # BLD AUTO: 4.99 MILLION/UL (ref 3.81–5.12)
SODIUM SERPL-SCNC: 142 MMOL/L (ref 136–145)
TIBC SERPL-MCNC: 371 UG/DL (ref 250–450)
TRIGL SERPL-MCNC: 166 MG/DL
TSH SERPL DL<=0.05 MIU/L-ACNC: 1.79 UIU/ML (ref 0.45–4.5)
WBC # BLD AUTO: 11.56 THOUSAND/UL (ref 4.31–10.16)

## 2022-05-16 PROCEDURE — 87389 HIV-1 AG W/HIV-1&-2 AB AG IA: CPT

## 2022-05-16 PROCEDURE — 85025 COMPLETE CBC W/AUTO DIFF WBC: CPT

## 2022-05-16 PROCEDURE — 80053 COMPREHEN METABOLIC PANEL: CPT

## 2022-05-16 PROCEDURE — 83036 HEMOGLOBIN GLYCOSYLATED A1C: CPT

## 2022-05-16 PROCEDURE — 86803 HEPATITIS C AB TEST: CPT

## 2022-05-16 PROCEDURE — 83550 IRON BINDING TEST: CPT

## 2022-05-16 PROCEDURE — 80061 LIPID PANEL: CPT

## 2022-05-16 PROCEDURE — 36415 COLL VENOUS BLD VENIPUNCTURE: CPT

## 2022-05-16 PROCEDURE — 82306 VITAMIN D 25 HYDROXY: CPT

## 2022-05-16 PROCEDURE — 86003 ALLG SPEC IGE CRUDE XTRC EA: CPT

## 2022-05-16 PROCEDURE — 84443 ASSAY THYROID STIM HORMONE: CPT

## 2022-05-16 PROCEDURE — 82728 ASSAY OF FERRITIN: CPT

## 2022-05-16 PROCEDURE — 83540 ASSAY OF IRON: CPT

## 2022-05-16 PROCEDURE — 82785 ASSAY OF IGE: CPT

## 2022-05-16 RX ORDER — AZITHROMYCIN 250 MG/1
TABLET, FILM COATED ORAL
Qty: 6 TABLET | Refills: 0 | Status: SHIPPED | OUTPATIENT
Start: 2022-05-16 | End: 2022-05-20

## 2022-05-16 NOTE — TELEPHONE ENCOUNTER
I never prescribed her an antbx, but did send one in today   Let her know that I also added additional labs for her to complete

## 2022-05-16 NOTE — TELEPHONE ENCOUNTER
Pt called and said that she finished that antibiotic and is still not feeling well, now is coughing and brining up green mucus

## 2022-05-17 LAB
A ALTERNATA IGE QN: <0.1 KUA/I
A FUMIGATUS IGE QN: <0.1 KUA/I
BERMUDA GRASS IGE QN: <0.1 KUA/I
BOXELDER IGE QN: <0.1 KUA/I
C HERBARUM IGE QN: <0.1 KUA/I
CAT DANDER IGE QN: <0.1 KUA/I
CMN PIGWEED IGE QN: <0.1 KUA/I
COMMON RAGWEED IGE QN: <0.1 KUA/I
COTTONWOOD IGE QN: <0.1 KUA/I
D FARINAE IGE QN: <0.1 KUA/I
D PTERONYSS IGE QN: <0.1 KUA/I
DOG DANDER IGE QN: <0.1 KUA/I
HCV AB SER QL: NORMAL
HIV 1+2 AB+HIV1 P24 AG SERPL QL IA: NORMAL
LONDON PLANE IGE QN: <0.1 KUA/I
MOUSE URINE PROT IGE QN: <0.1 KUA/I
MT JUNIPER IGE QN: <0.1 KUA/I
MUGWORT IGE QN: <0.1 KUA/I
P NOTATUM IGE QN: <0.1 KUA/I
ROACH IGE QN: <0.1 KUA/I
SHEEP SORREL IGE QN: <0.1 KUA/I
SILVER BIRCH IGE QN: <0.1 KUA/I
TIMOTHY IGE QN: <0.1 KUA/I
TOTAL IGE SMQN RAST: 7.56 KU/L (ref 0–113)
WALNUT IGE QN: <0.1 KUA/I
WHITE ASH IGE QN: <0.1 KUA/I
WHITE ELM IGE QN: <0.1 KUA/I
WHITE MULBERRY IGE QN: <0.1 KUA/I
WHITE OAK IGE QN: <0.1 KUA/I

## 2022-05-18 DIAGNOSIS — E55.9 VITAMIN D DEFICIENCY: Primary | ICD-10-CM

## 2022-05-18 RX ORDER — ERGOCALCIFEROL 1.25 MG/1
50000 CAPSULE ORAL WEEKLY
Qty: 12 CAPSULE | Refills: 1 | Status: SHIPPED | OUTPATIENT
Start: 2022-05-18
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
## 2022-08-23 ENCOUNTER — TELEMEDICINE (OUTPATIENT)
Dept: FAMILY MEDICINE CLINIC | Facility: CLINIC | Age: 32
End: 2022-08-23
Payer: COMMERCIAL

## 2022-08-23 DIAGNOSIS — E61.1 IRON DEFICIENCY: ICD-10-CM

## 2022-08-23 DIAGNOSIS — R53.82 CHRONIC FATIGUE: ICD-10-CM

## 2022-08-23 DIAGNOSIS — D75.839 THROMBOCYTHEMIA: ICD-10-CM

## 2022-08-23 DIAGNOSIS — Z13.1 SCREENING FOR DIABETES MELLITUS: ICD-10-CM

## 2022-08-23 DIAGNOSIS — E55.9 VITAMIN D DEFICIENCY: Primary | ICD-10-CM

## 2022-08-23 PROCEDURE — 3725F SCREEN DEPRESSION PERFORMED: CPT | Performed by: NURSE PRACTITIONER

## 2022-08-23 PROCEDURE — 99214 OFFICE O/P EST MOD 30 MIN: CPT | Performed by: NURSE PRACTITIONER

## 2022-08-25 ENCOUNTER — APPOINTMENT (OUTPATIENT)
Dept: LAB | Facility: HOSPITAL | Age: 32
End: 2022-08-25
Payer: COMMERCIAL

## 2022-08-25 DIAGNOSIS — Z13.1 SCREENING FOR DIABETES MELLITUS: ICD-10-CM

## 2022-08-25 DIAGNOSIS — E61.1 IRON DEFICIENCY: ICD-10-CM

## 2022-08-25 DIAGNOSIS — R53.82 CHRONIC FATIGUE: ICD-10-CM

## 2022-08-25 DIAGNOSIS — D75.839 THROMBOCYTHEMIA: ICD-10-CM

## 2022-08-25 DIAGNOSIS — E55.9 VITAMIN D DEFICIENCY: ICD-10-CM

## 2022-08-25 LAB
25(OH)D3 SERPL-MCNC: 49.6 NG/ML (ref 30–100)
ALBUMIN SERPL BCP-MCNC: 4.5 G/DL (ref 3.5–5)
ALP SERPL-CCNC: 62 U/L (ref 34–104)
ALT SERPL W P-5'-P-CCNC: 12 U/L (ref 7–52)
ANION GAP SERPL CALCULATED.3IONS-SCNC: 8 MMOL/L (ref 4–13)
AST SERPL W P-5'-P-CCNC: 14 U/L (ref 13–39)
BASOPHILS # BLD AUTO: 0.04 THOUSANDS/ΜL (ref 0–0.1)
BASOPHILS NFR BLD AUTO: 0 % (ref 0–1)
BILIRUB SERPL-MCNC: 0.71 MG/DL (ref 0.2–1)
BUN SERPL-MCNC: 15 MG/DL (ref 5–25)
CALCIUM SERPL-MCNC: 9.5 MG/DL (ref 8.4–10.2)
CHLORIDE SERPL-SCNC: 100 MMOL/L (ref 96–108)
CO2 SERPL-SCNC: 27 MMOL/L (ref 21–32)
CREAT SERPL-MCNC: 0.67 MG/DL (ref 0.6–1.3)
EOSINOPHIL # BLD AUTO: 0.1 THOUSAND/ΜL (ref 0–0.61)
EOSINOPHIL NFR BLD AUTO: 1 % (ref 0–6)
ERYTHROCYTE [DISTWIDTH] IN BLOOD BY AUTOMATED COUNT: 13.7 % (ref 11.6–15.1)
ERYTHROCYTE [SEDIMENTATION RATE] IN BLOOD: 26 MM/HOUR (ref 0–19)
EST. AVERAGE GLUCOSE BLD GHB EST-MCNC: 105 MG/DL
FERRITIN SERPL-MCNC: 36 NG/ML (ref 8–388)
GFR SERPL CREATININE-BSD FRML MDRD: 115 ML/MIN/1.73SQ M
GLUCOSE SERPL-MCNC: 98 MG/DL (ref 65–140)
HBA1C MFR BLD: 5.3 %
HCT VFR BLD AUTO: 42.9 % (ref 34.8–46.1)
HGB BLD-MCNC: 13.6 G/DL (ref 11.5–15.4)
IMM GRANULOCYTES # BLD AUTO: 0.06 THOUSAND/UL (ref 0–0.2)
IMM GRANULOCYTES NFR BLD AUTO: 0 % (ref 0–2)
IRON SATN MFR SERPL: 18 % (ref 15–50)
IRON SERPL-MCNC: 63 UG/DL (ref 50–170)
LYMPHOCYTES # BLD AUTO: 4.29 THOUSANDS/ΜL (ref 0.6–4.47)
LYMPHOCYTES NFR BLD AUTO: 31 % (ref 14–44)
MCH RBC QN AUTO: 28.2 PG (ref 26.8–34.3)
MCHC RBC AUTO-ENTMCNC: 31.7 G/DL (ref 31.4–37.4)
MCV RBC AUTO: 89 FL (ref 82–98)
MONOCYTES # BLD AUTO: 0.76 THOUSAND/ΜL (ref 0.17–1.22)
MONOCYTES NFR BLD AUTO: 6 % (ref 4–12)
NEUTROPHILS # BLD AUTO: 8.63 THOUSANDS/ΜL (ref 1.85–7.62)
NEUTS SEG NFR BLD AUTO: 62 % (ref 43–75)
NRBC BLD AUTO-RTO: 0 /100 WBCS
PLATELET # BLD AUTO: 401 THOUSANDS/UL (ref 149–390)
PMV BLD AUTO: 9.2 FL (ref 8.9–12.7)
POTASSIUM SERPL-SCNC: 3.6 MMOL/L (ref 3.5–5.3)
PROT SERPL-MCNC: 7.6 G/DL (ref 6.4–8.4)
RBC # BLD AUTO: 4.83 MILLION/UL (ref 3.81–5.12)
SODIUM SERPL-SCNC: 135 MMOL/L (ref 135–147)
TIBC SERPL-MCNC: 355 UG/DL (ref 250–450)
TSH SERPL DL<=0.05 MIU/L-ACNC: 3.18 UIU/ML (ref 0.45–4.5)
VIT B12 SERPL-MCNC: 389 PG/ML (ref 100–900)
WBC # BLD AUTO: 13.88 THOUSAND/UL (ref 4.31–10.16)

## 2022-08-25 PROCEDURE — 82728 ASSAY OF FERRITIN: CPT

## 2022-08-25 PROCEDURE — 36415 COLL VENOUS BLD VENIPUNCTURE: CPT

## 2022-08-25 PROCEDURE — 85025 COMPLETE CBC W/AUTO DIFF WBC: CPT

## 2022-08-25 PROCEDURE — 82607 VITAMIN B-12: CPT

## 2022-08-25 PROCEDURE — 85652 RBC SED RATE AUTOMATED: CPT

## 2022-08-25 PROCEDURE — 83540 ASSAY OF IRON: CPT

## 2022-08-25 PROCEDURE — 80053 COMPREHEN METABOLIC PANEL: CPT

## 2022-08-25 PROCEDURE — 82306 VITAMIN D 25 HYDROXY: CPT

## 2022-08-25 PROCEDURE — 86618 LYME DISEASE ANTIBODY: CPT

## 2022-08-25 PROCEDURE — 83036 HEMOGLOBIN GLYCOSYLATED A1C: CPT

## 2022-08-25 PROCEDURE — 83550 IRON BINDING TEST: CPT

## 2022-08-25 PROCEDURE — 86376 MICROSOMAL ANTIBODY EACH: CPT

## 2022-08-25 PROCEDURE — 84443 ASSAY THYROID STIM HORMONE: CPT

## 2022-08-26 ENCOUNTER — APPOINTMENT (EMERGENCY)
Dept: CT IMAGING | Facility: HOSPITAL | Age: 32
End: 2022-08-26
Payer: COMMERCIAL

## 2022-08-26 ENCOUNTER — HOSPITAL ENCOUNTER (EMERGENCY)
Facility: HOSPITAL | Age: 32
Discharge: HOME | End: 2022-08-26
Attending: EMERGENCY MEDICINE
Payer: COMMERCIAL

## 2022-08-26 VITALS
HEART RATE: 96 BPM | DIASTOLIC BLOOD PRESSURE: 88 MMHG | OXYGEN SATURATION: 98 % | SYSTOLIC BLOOD PRESSURE: 123 MMHG | RESPIRATION RATE: 18 BRPM | TEMPERATURE: 97.9 F

## 2022-08-26 DIAGNOSIS — G50.0 TRIGEMINAL NEURALGIA OF LEFT SIDE OF FACE: Primary | ICD-10-CM

## 2022-08-26 LAB
ANION GAP SERPL CALCULATED.3IONS-SCNC: 8 MMOL/L (ref 4–13)
B BURGDOR IGG+IGM SER-ACNC: <0.2 AI
BASOPHILS # BLD AUTO: 0.02 THOUSANDS/ΜL (ref 0–0.1)
BASOPHILS NFR BLD AUTO: 0 % (ref 0–1)
BILIRUB UR QL STRIP: NEGATIVE
BUN SERPL-MCNC: 16 MG/DL (ref 5–25)
CALCIUM SERPL-MCNC: 9.4 MG/DL (ref 8.4–10.2)
CHLORIDE SERPL-SCNC: 104 MMOL/L (ref 96–108)
CLARITY UR: CLEAR
CO2 SERPL-SCNC: 24 MMOL/L (ref 21–32)
COLOR UR: YELLOW
CREAT SERPL-MCNC: 0.68 MG/DL (ref 0.6–1.3)
CRP SERPL QL: 2.6 MG/L
EOSINOPHIL # BLD AUTO: 0 THOUSAND/ΜL (ref 0–0.61)
EOSINOPHIL NFR BLD AUTO: 0 % (ref 0–6)
ERYTHROCYTE [DISTWIDTH] IN BLOOD BY AUTOMATED COUNT: 13.8 % (ref 11.6–15.1)
ERYTHROCYTE [SEDIMENTATION RATE] IN BLOOD: 41 MM/HOUR (ref 0–19)
EXT PREG TEST URINE: NEGATIVE
EXT. CONTROL ED NAV: NORMAL
GFR SERPL CREATININE-BSD FRML MDRD: 115 ML/MIN/1.73SQ M
GLUCOSE SERPL-MCNC: 146 MG/DL (ref 65–140)
GLUCOSE UR STRIP-MCNC: NEGATIVE MG/DL
HCT VFR BLD AUTO: 40.9 % (ref 34.8–46.1)
HGB BLD-MCNC: 13.1 G/DL (ref 11.5–15.4)
HGB UR QL STRIP.AUTO: NEGATIVE
IMM GRANULOCYTES # BLD AUTO: 0.04 THOUSAND/UL (ref 0–0.2)
IMM GRANULOCYTES NFR BLD AUTO: 0 % (ref 0–2)
KETONES UR STRIP-MCNC: NEGATIVE MG/DL
LEUKOCYTE ESTERASE UR QL STRIP: NEGATIVE
LYMPHOCYTES # BLD AUTO: 1.26 THOUSANDS/ΜL (ref 0.6–4.47)
LYMPHOCYTES NFR BLD AUTO: 10 % (ref 14–44)
MCH RBC QN AUTO: 28.2 PG (ref 26.8–34.3)
MCHC RBC AUTO-ENTMCNC: 32 G/DL (ref 31.4–37.4)
MCV RBC AUTO: 88 FL (ref 82–98)
MONOCYTES # BLD AUTO: 0.12 THOUSAND/ΜL (ref 0.17–1.22)
MONOCYTES NFR BLD AUTO: 1 % (ref 4–12)
NEUTROPHILS # BLD AUTO: 10.83 THOUSANDS/ΜL (ref 1.85–7.62)
NEUTS SEG NFR BLD AUTO: 89 % (ref 43–75)
NITRITE UR QL STRIP: NEGATIVE
NRBC BLD AUTO-RTO: 0 /100 WBCS
PH UR STRIP.AUTO: 6 [PH]
PLATELET # BLD AUTO: 395 THOUSANDS/UL (ref 149–390)
PMV BLD AUTO: 9.2 FL (ref 8.9–12.7)
POTASSIUM SERPL-SCNC: 4 MMOL/L (ref 3.5–5.3)
PROT UR STRIP-MCNC: NEGATIVE MG/DL
RBC # BLD AUTO: 4.64 MILLION/UL (ref 3.81–5.12)
SODIUM SERPL-SCNC: 136 MMOL/L (ref 135–147)
SP GR UR STRIP.AUTO: >=1.03 (ref 1–1.03)
THYROPEROXIDASE AB SERPL-ACNC: <8 IU/ML (ref 0–34)
UROBILINOGEN UR QL STRIP.AUTO: 0.2 E.U./DL
WBC # BLD AUTO: 12.27 THOUSAND/UL (ref 4.31–10.16)

## 2022-08-26 PROCEDURE — 86140 C-REACTIVE PROTEIN: CPT | Performed by: PHYSICIAN ASSISTANT

## 2022-08-26 PROCEDURE — 70487 CT MAXILLOFACIAL W/DYE: CPT

## 2022-08-26 PROCEDURE — 99284 EMERGENCY DEPT VISIT MOD MDM: CPT | Performed by: PHYSICIAN ASSISTANT

## 2022-08-26 PROCEDURE — 80048 BASIC METABOLIC PNL TOTAL CA: CPT | Performed by: PHYSICIAN ASSISTANT

## 2022-08-26 PROCEDURE — 93005 ELECTROCARDIOGRAM TRACING: CPT

## 2022-08-26 PROCEDURE — 36415 COLL VENOUS BLD VENIPUNCTURE: CPT | Performed by: PHYSICIAN ASSISTANT

## 2022-08-26 PROCEDURE — 99284 EMERGENCY DEPT VISIT MOD MDM: CPT

## 2022-08-26 PROCEDURE — 96360 HYDRATION IV INFUSION INIT: CPT

## 2022-08-26 PROCEDURE — 81003 URINALYSIS AUTO W/O SCOPE: CPT | Performed by: PHYSICIAN ASSISTANT

## 2022-08-26 PROCEDURE — 85025 COMPLETE CBC W/AUTO DIFF WBC: CPT | Performed by: PHYSICIAN ASSISTANT

## 2022-08-26 PROCEDURE — 81025 URINE PREGNANCY TEST: CPT | Performed by: PHYSICIAN ASSISTANT

## 2022-08-26 PROCEDURE — 85652 RBC SED RATE AUTOMATED: CPT | Performed by: PHYSICIAN ASSISTANT

## 2022-08-26 RX ORDER — CARBAMAZEPINE 100 MG/1
100 CAPSULE, EXTENDED RELEASE ORAL 2 TIMES DAILY
Qty: 40 CAPSULE | Refills: 0 | Status: SHIPPED | OUTPATIENT
Start: 2022-08-26 | End: 2022-08-26 | Stop reason: SDUPTHER

## 2022-08-26 RX ORDER — CARBAMAZEPINE 100 MG/1
100 CAPSULE, EXTENDED RELEASE ORAL 2 TIMES DAILY
Qty: 40 CAPSULE | Refills: 0 | Status: SHIPPED | OUTPATIENT
Start: 2022-08-26

## 2022-08-26 RX ADMIN — SODIUM CHLORIDE 1000 ML: 0.9 INJECTION, SOLUTION INTRAVENOUS at 21:38

## 2022-08-26 RX ADMIN — IOHEXOL 75 ML: 350 INJECTION, SOLUTION INTRAVENOUS at 22:28

## 2022-08-27 LAB
ATRIAL RATE: 85 BPM
P AXIS: 32 DEGREES
PR INTERVAL: 122 MS
QRS AXIS: 63 DEGREES
QRSD INTERVAL: 88 MS
QT INTERVAL: 350 MS
QTC INTERVAL: 416 MS
T WAVE AXIS: 46 DEGREES
VENTRICULAR RATE: 85 BPM

## 2022-08-27 PROCEDURE — 93010 ELECTROCARDIOGRAM REPORT: CPT | Performed by: INTERNAL MEDICINE

## 2022-08-29 NOTE — ED PROVIDER NOTES
History  Chief Complaint   Patient presents with    Pain     Pt reports left facial pain that is radiating down her left arm and left chest x1 week and worsened over the last two days     23-year-old female presents to the emergency department seeking evaluation for left-sided facial pain that she states originates near the ear and radiates across the maxilla  She states as though it is feels like she is being struck by lightening in the face or being beaten with a baseball bat  Patient states the pain comes and goes  It is aggravated by movement and chewing  She is overall well-appearing in no acute distress  She is accompanied by family  Patient does report some anxiety associated with her pain  Patient states that she has been seen by a dentist who states that they were unable to find anything wrong as well as an ENT doctor  Allergies reviewed          Prior to Admission Medications   Prescriptions Last Dose Informant Patient Reported? Taking?    Cetirizine HCl (ZyrTEC Allergy) 10 MG CAPS  Self Yes No   Sig: Zyrtec   Patient not taking: Reported on 2022   ergocalciferol (VITAMIN D2) 50,000 units  Self No No   Sig: Take 1 capsule (50,000 Units total) by mouth once a week   ibuprofen (MOTRIN) 800 mg tablet  Self Yes No   Sig: Take 1 tablet by mouth 3 (three) times a day as needed   montelukast (SINGULAIR) 10 mg tablet  Self No No   Sig: Take 1 tablet (10 mg total) by mouth daily at bedtime   predniSONE 20 mg tablet   No No   Sig: 3 tabs by mouth daily x 3 days, 2 tabs by mouth daily x 3 days, then 1 tab by mouth daily x 3 days      Facility-Administered Medications: None       Past Medical History:   Diagnosis Date    Allergic rhinitis     Brachial plexopathy     radiculopathy    Concussion 2018    mild memory deficit    Dizziness and giddiness     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       Past Surgical History:   Procedure Laterality Date     SECTION      x2       Family History   Problem Relation Age of Onset    Diabetes type II Mother     Deafness Father     Depression Father     Asthma Son      I have reviewed and agree with the history as documented  E-Cigarette/Vaping    E-Cigarette Use Never User      E-Cigarette/Vaping Substances    Nicotine No     THC No     CBD No     Flavoring No     Other No     Unknown No      Social History     Tobacco Use    Smoking status: Never Smoker    Smokeless tobacco: Never Used   Vaping Use    Vaping Use: Never used   Substance Use Topics    Alcohol use: Yes     Comment: social    Drug use: No       Review of Systems   Constitutional: Negative for chills, fatigue and fever  HENT: Negative for congestion, ear pain, rhinorrhea, sinus pressure, sneezing, sore throat and trouble swallowing  Eyes: Negative for discharge and itching  Respiratory: Negative for cough, chest tightness, shortness of breath, wheezing and stridor  Cardiovascular: Negative for chest pain and palpitations  Gastrointestinal: Negative for abdominal pain, diarrhea, nausea and vomiting  Neurological: Negative for dizziness, syncope, numbness and headaches  All other systems reviewed and are negative  Physical Exam  Physical Exam  Vitals and nursing note reviewed  Constitutional:       General: She is not in acute distress  Appearance: She is well-developed  She is not diaphoretic  HENT:      Head: Normocephalic and atraumatic  Right Ear: External ear normal       Left Ear: External ear normal       Nose: Nose normal       Mouth/Throat:      Lips: Pink  Mouth: Mucous membranes are moist       Dentition: Normal dentition  No dental tenderness  Pharynx: Oropharynx is clear  Eyes:      Conjunctiva/sclera: Conjunctivae normal       Pupils: Pupils are equal, round, and reactive to light  Cardiovascular:      Rate and Rhythm: Normal rate and regular rhythm  Heart sounds: Normal heart sounds  No murmur heard      No friction rub  No gallop  Pulmonary:      Effort: Pulmonary effort is normal  No respiratory distress  Breath sounds: Normal breath sounds  No stridor  No wheezing or rales  Abdominal:      General: Bowel sounds are normal  There is no distension  Palpations: Abdomen is soft  Tenderness: There is no abdominal tenderness  There is no guarding  Musculoskeletal:         General: No tenderness  Normal range of motion  Cervical back: Normal range of motion  Skin:     General: Skin is warm  Capillary Refill: Capillary refill takes less than 2 seconds  Neurological:      Mental Status: She is alert and oriented to person, place, and time           Vital Signs  ED Triage Vitals [08/26/22 2041]   Temperature Pulse Respirations Blood Pressure SpO2   97 9 °F (36 6 °C) 103 18 144/78 98 %      Temp Source Heart Rate Source Patient Position - Orthostatic VS BP Location FiO2 (%)   Tympanic Monitor -- Right arm --      Pain Score       7           Vitals:    08/26/22 2041 08/26/22 2259   BP: 144/78 123/88   Pulse: 103 96         Visual Acuity      ED Medications  Medications   sodium chloride 0 9 % bolus 1,000 mL (0 mL Intravenous Stopped 8/26/22 2238)   iohexol (OMNIPAQUE) 350 MG/ML injection (MULTI-DOSE) 75 mL (75 mL Intravenous Given 8/26/22 2228)       Diagnostic Studies  Results Reviewed     Procedure Component Value Units Date/Time    Basic metabolic panel [[id number]]  (Abnormal) Collected: 08/26/22 2137    Lab Status: Final result Specimen: Blood from Arm, Right Updated: 08/26/22 2211     Sodium 136 mmol/L      Potassium 4 0 mmol/L      Chloride 104 mmol/L      CO2 24 mmol/L      ANION GAP 8 mmol/L      BUN 16 mg/dL      Creatinine 0 68 mg/dL      Glucose 146 mg/dL      Calcium 9 4 mg/dL      eGFR 115 ml/min/1 73sq m     Narrative:      [place] guidelines for Chronic Kidney Disease (CKD):     Stage 1 with normal or high GFR (GFR > 90 mL/min/1 73 square meters)   Stage 2 Mild CKD (GFR = 60-89 mL/min/1 73 square meters)    Stage 3A Moderate CKD (GFR = 45-59 mL/min/1 73 square meters)    Stage 3B Moderate CKD (GFR = 30-44 mL/min/1 73 square meters)    Stage 4 Severe CKD (GFR = 15-29 mL/min/1 73 square meters)    Stage 5 End Stage CKD (GFR <15 mL/min/1 73 square meters)  Note: GFR calculation is accurate only with a steady state creatinine    C-reactive protein [[id number]]  (Normal) Collected: 08/26/22 2137    Lab Status: Final result Specimen: Blood from Arm, Right Updated: 08/26/22 2211     CRP 2 6 mg/L     Sedimentation rate, automated [[id number]]  (Abnormal) Collected: 08/26/22 2137    Lab Status: Final result Specimen: Blood from Arm, Right Updated: 08/26/22 2200     Sed Rate 41 mm/hour     UA (URINE) with reflex to Scope [[id number]]  (Abnormal) Collected: 08/26/22 2141    Lab Status: Final result Specimen: Urine, Clean Catch Updated: 08/26/22 2157     Color, UA Yellow     Clarity, UA Clear     Specific Gravity, UA >=1 030     pH, UA 6 0     Leukocytes, UA Negative     Nitrite, UA Negative     Protein, UA Negative mg/dl      Glucose, UA Negative mg/dl      Ketones, UA Negative mg/dl      Urobilinogen, UA 0 2 E U /dl      Bilirubin, UA Negative     Occult Blood, UA Negative    CBC and differential [[id number]]  (Abnormal) Collected: 08/26/22 2137    Lab Status: Final result Specimen: Blood from Arm, Right Updated: 08/26/22 2154     WBC 12 27 Thousand/uL      RBC 4 64 Million/uL      Hemoglobin 13 1 g/dL      Hematocrit 40 9 %      MCV 88 fL      MCH 28 2 pg      MCHC 32 0 g/dL      RDW 13 8 %      MPV 9 2 fL      Platelets 719 Thousands/uL      nRBC 0 /100 WBCs      Neutrophils Relative 89 %      Immat GRANS % 0 %      Lymphocytes Relative 10 %      Monocytes Relative 1 %      Eosinophils Relative 0 %      Basophils Relative 0 %      Neutrophils Absolute 10 83 Thousands/µL      Immature Grans Absolute 0 04 Thousand/uL      Lymphocytes Absolute 1 26 Thousands/µL Monocytes Absolute 0 12 Thousand/µL      Eosinophils Absolute 0 00 Thousand/µL      Basophils Absolute 0 02 Thousands/µL     POCT pregnancy, urine [[id number]]  (Normal) Resulted: 08/26/22 2138    Lab Status: Final result Updated: 08/26/22 2138     EXT PREG TEST UR (Ref: Negative) negative     Control valid                 CT facial bones with contrast   Final Result by Idania Pelayo MD (08/26 2258)      No facial soft tissue swelling or discrete enhancing collection identified  No significant sinonasal mucosal disease  Workstation performed: [id number]                    Procedures  Procedures         ED Course                                             MDM  Number of Diagnoses or Management Options  Trigeminal neuralgia of left side of face  Diagnosis management comments: Benign physical examination  Concern for likely trigeminal neuralgia the left side of face  CT imaging reviewed with patient  Will trial carbamazepine  Recommend follow-up with PCP  Patient educated regarding their diagnosis and given return and follow-up instructions  Patient was advised to returned to the ED with worsening symptoms or concerns  Patient is understanding of and in agreement with the treatment plan  There are no questions at the time of discharge         Amount and/or Complexity of Data Reviewed  Clinical lab tests: ordered and reviewed  Tests in the radiology section of CPT®: ordered and reviewed    Risk of Complications, Morbidity, and/or Mortality  Presenting problems: low  Diagnostic procedures: low  Management options: low    Patient Progress  Patient progress: stable      Disposition  Final diagnoses:   Trigeminal neuralgia of left side of face     Time reflects when diagnosis was documented in both MDM as applicable and the Disposition within this note     Time User Action Codes Description Comment    8/26/2022 11:14 PM Malia Dawkins Add [G50 0] Trigeminal neuralgia of left side of face       ED Disposition ED Disposition   Discharge    Condition   Stable    Date/Time   Fri Aug 26, 2022 11:12 PM    Comment   Ledy Simental discharge to home/self care  Follow-up Information     Follow up With Specialties Details Why [address], [address] [facility], Nurse Practitioner   [address] [address] [address]  [phone]            Discharge Medication List as of 8/26/2022 11:15 PM      CONTINUE these medications which have CHANGED    Details   carbamazepine (CARBATROL) 100 MG 12 hr capsule Take 1 capsule (100 mg total) by mouth 2 (two) times a day After 3 days, take 2 tablets twice per day, Starting Fri 8/26/2022, Normal         CONTINUE these medications which have NOT CHANGED    Details   Cetirizine HCl (ZyrTEC Allergy) 10 MG CAPS Zyrtec, Historical Med      ergocalciferol (VITAMIN D2) 50,000 units Take 1 capsule (50,000 Units total) by mouth once a week, Starting Wed 5/18/2022, Normal      ibuprofen (MOTRIN) 800 mg tablet Take 1 tablet by mouth 3 (three) times a day as needed, Starting Fri 12/21/2018, Historical Med      montelukast (SINGULAIR) 10 mg tablet Take 1 tablet (10 mg total) by mouth daily at bedtime, Starting Wed 5/11/2022, Normal      predniSONE 20 mg tablet 3 tabs by mouth daily x 3 days, 2 tabs by mouth daily x 3 days, then 1 tab by mouth daily x 3 days, Normal             No discharge procedures on file      PDMP Review     None          ED Provider  Electronically Signed by           Diann Mensah PA-C  08/29/22 0954

## 2022-09-01 ENCOUNTER — OFFICE VISIT (OUTPATIENT)
Dept: FAMILY MEDICINE CLINIC | Facility: CLINIC | Age: 32
End: 2022-09-01
Payer: COMMERCIAL

## 2022-09-01 VITALS
OXYGEN SATURATION: 99 % | HEART RATE: 94 BPM | BODY MASS INDEX: 36.67 KG/M2 | HEIGHT: 64 IN | TEMPERATURE: 97.4 F | DIASTOLIC BLOOD PRESSURE: 80 MMHG | WEIGHT: 214.8 LBS | SYSTOLIC BLOOD PRESSURE: 118 MMHG

## 2022-09-01 DIAGNOSIS — F41.9 ANXIETY: ICD-10-CM

## 2022-09-01 DIAGNOSIS — D75.839 THROMBOCYTHEMIA: ICD-10-CM

## 2022-09-01 DIAGNOSIS — G50.9 IDIOPATHIC TRIGEMINAL NEUROPATHY: Primary | ICD-10-CM

## 2022-09-01 PROCEDURE — 99214 OFFICE O/P EST MOD 30 MIN: CPT | Performed by: NURSE PRACTITIONER

## 2022-09-01 RX ORDER — DIAZEPAM 5 MG/1
5 TABLET ORAL EVERY 8 HOURS PRN
Qty: 30 TABLET | Refills: 0 | Status: SHIPPED | OUTPATIENT
Start: 2022-09-01

## 2022-09-01 NOTE — ASSESSMENT & PLAN NOTE
Was seen in ED, new dx, was ordered on carbamazepine, has not started,quesitons whether pain was related to wisdom teeth  2 wisdom teeth removed yesterday, if pain is persistant she will start medication

## 2022-09-01 NOTE — ASSESSMENT & PLAN NOTE
Has known hx, has resurfaced with medical problems occurring over the last month   Will start low dose valium, can use intermittently

## 2022-09-01 NOTE — PROGRESS NOTES
OFFICE VISIT  Deric Ascencio 33 y o  female MRN: [id number]    BMI Counseling: Body mass index is 36 87 kg/m²  The BMI is above normal  Nutrition recommendations include decreasing portion sizes  Exercise recommendations include moderate physical activity 150 minutes/week  Rationale for BMI follow-up plan is due to patient being overweight or obese  Assessment / Plan:  Problem List Items Addressed This Visit        Nervous and Auditory    Idiopathic trigeminal neuropathy - Primary     Was seen in ED, new dx, was ordered on carbamazepine, has not started,quesitons whether pain was related to wisdom teeth  2 wisdom teeth removed yesterday, if pain is persistant she will start medication  Relevant Medications    diazepam (VALIUM) 5 mg tablet       Hematopoietic and Hemostatic    Thrombocythemia     Will need followup labs end of year            Other    Anxiety     Has known hx, has resurfaced with medical problems occurring over the last month  Will start low dose valium, can use intermittently                   Reason For Visit / Chief Complaint  Chief Complaint   Patient presents with    Follow-up     Pt presents to the office for follow up from the ER  Pt reports that she has left ear pain with facial pain  Pt had upper wisdom teeth removed         HPI:  Deric Ascencio is a 33 y o  female who presents today for followup  She reports pain and pressure to left facial cheek, recent wisdom teeth removed, upper   She has seen ENT, has had ct scan done, lab work     Historical Information   Past Medical History:   Diagnosis Date    Allergic rhinitis     Brachial plexopathy     radiculopathy    Concussion 2018    mild memory deficit    Dizziness and giddiness     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     Past Surgical History:   Procedure Laterality Date     SECTION      x2     Social History   Social History     Substance and Sexual Activity   Alcohol Use Yes    Comment: social     Social History     Substance and Sexual Activity   Drug Use No     Social History     Tobacco Use   Smoking Status Never Smoker   Smokeless Tobacco Never Used     Family History   Problem Relation Age of Onset    Diabetes type II Mother     Deafness Father     Depression Father     Asthma Son        Meds/Allergies   Allergies   Allergen Reactions    Fioricet [Butalbital-Apap-Caffeine] Itching    Fioricet [Butalbital-Apap-Caffeine]        Meds:    Current Outpatient Medications:     carbamazepine (CARBATROL) 100 MG 12 hr capsule, Take 1 capsule (100 mg total) by mouth 2 (two) times a day After 3 days, take 2 tablets twice per day, Disp: 40 capsule, Rfl: 0    Cetirizine HCl (ZyrTEC Allergy) 10 MG CAPS, , Disp: , Rfl:     diazepam (VALIUM) 5 mg tablet, Take 1 tablet (5 mg total) by mouth every 8 (eight) hours as needed for anxiety, Disp: 30 tablet, Rfl: 0    ergocalciferol (VITAMIN D2) 50,000 units, Take 1 capsule (50,000 Units total) by mouth once a week, Disp: 12 capsule, Rfl: 1    ibuprofen (MOTRIN) 800 mg tablet, Take 1 tablet by mouth 3 (three) times a day as needed, Disp: , Rfl:     montelukast (SINGULAIR) 10 mg tablet, Take 1 tablet (10 mg total) by mouth daily at bedtime, Disp: 30 tablet, Rfl: 1    predniSONE 20 mg tablet, 3 tabs by mouth daily x 3 days, 2 tabs by mouth daily x 3 days, then 1 tab by mouth daily x 3 days, Disp: 18 tablet, Rfl: 1      REVIEW OF SYSTEMS  Review of Systems   Constitutional: Negative for activity change, chills, fatigue and fever  HENT: Positive for sinus pressure and sinus pain  Negative for congestion, ear discharge, ear pain, sore throat, tinnitus and trouble swallowing  Facial pain    Eyes: Negative for photophobia, pain, discharge, itching and visual disturbance  Respiratory: Negative for cough, chest tightness, shortness of breath and wheezing  Cardiovascular: Negative for chest pain and leg swelling     Gastrointestinal: Negative for abdominal distention, abdominal pain, constipation, diarrhea, nausea and vomiting  Endocrine: Negative for polydipsia, polyphagia and polyuria  Genitourinary: Negative for dysuria and frequency  Musculoskeletal: Negative for arthralgias, myalgias, neck pain and neck stiffness  Skin: Negative for color change  Neurological: Negative for dizziness, syncope, weakness, numbness and headaches  Hematological: Does not bruise/bleed easily  Psychiatric/Behavioral: Negative for behavioral problems, confusion, self-injury, sleep disturbance and suicidal ideas  The patient is not nervous/anxious  Current Vitals:   Blood Pressure: 118/80 (09/01/22 1251)  Pulse: 94 (09/01/22 1251)  Temperature: (!) 97 4 °F (36 3 °C) (09/01/22 1251)  Height: 5' 4" (162 6 cm) (09/01/22 1251)  Weight - Scale: 97 4 kg (214 lb 12 8 oz) (09/01/22 1251)  SpO2: 99 % (09/01/22 1251)  [unfilled]    PHYSICAL EXAMS:  Physical Exam  Vitals and nursing note reviewed  Constitutional:       Appearance: Normal appearance  She is well-developed  HENT:      Head: Normocephalic and atraumatic  Cardiovascular:      Rate and Rhythm: Normal rate and regular rhythm  Pulses: Normal pulses  Heart sounds: Normal heart sounds  Pulmonary:      Effort: Pulmonary effort is normal       Breath sounds: Normal breath sounds  No wheezing or rhonchi  Abdominal:      General: There is no distension  Tenderness: There is no abdominal tenderness  Musculoskeletal:         General: No swelling, tenderness, deformity or signs of injury  Right lower leg: No edema  Left lower leg: No edema  Skin:     Findings: No bruising, erythema, lesion or rash  Comments: Pain to left cheek area/pressure, bee sting feeling    Neurological:      General: No focal deficit present  Mental Status: She is alert and oriented to person, place, and time     Psychiatric:         Mood and Affect: Mood normal          Behavior: Behavior normal          Thought Content: Thought content normal          Judgment: Judgment normal              Lab, imaging and other studies: I have personally reviewed pertinent reports  Jermain Salguero

## 2022-09-10 ENCOUNTER — HOSPITAL ENCOUNTER (EMERGENCY)
Facility: HOSPITAL | Age: 32
Discharge: HOME | End: 2022-09-10
Attending: EMERGENCY MEDICINE
Payer: COMMERCIAL

## 2022-09-10 VITALS
RESPIRATION RATE: 20 BRPM | TEMPERATURE: 98.7 F | OXYGEN SATURATION: 99 % | SYSTOLIC BLOOD PRESSURE: 136 MMHG | DIASTOLIC BLOOD PRESSURE: 91 MMHG | HEART RATE: 103 BPM

## 2022-09-10 DIAGNOSIS — R51.9 LEFT FACIAL PAIN: Primary | ICD-10-CM

## 2022-09-10 PROCEDURE — 99284 EMERGENCY DEPT VISIT MOD MDM: CPT | Performed by: EMERGENCY MEDICINE

## 2022-09-10 PROCEDURE — 96372 THER/PROPH/DIAG INJ SC/IM: CPT

## 2022-09-10 PROCEDURE — 99283 EMERGENCY DEPT VISIT LOW MDM: CPT

## 2022-09-10 RX ORDER — HYDROMORPHONE HCL/PF 1 MG/ML
1 SYRINGE (ML) INJECTION ONCE
Status: DISCONTINUED | OUTPATIENT
Start: 2022-09-10 | End: 2022-09-10

## 2022-09-10 RX ORDER — KETOROLAC TROMETHAMINE 30 MG/ML
30 INJECTION, SOLUTION INTRAMUSCULAR; INTRAVENOUS ONCE
Status: COMPLETED | OUTPATIENT
Start: 2022-09-10 | End: 2022-09-10

## 2022-09-10 RX ORDER — MORPHINE SULFATE 15 MG/1
15 TABLET ORAL EVERY 4 HOURS PRN
Qty: 20 TABLET | Refills: 0 | Status: SHIPPED | OUTPATIENT
Start: 2022-09-10

## 2022-09-10 RX ADMIN — KETOROLAC TROMETHAMINE 30 MG: 30 INJECTION, SOLUTION INTRAMUSCULAR at 12:03

## 2022-09-10 NOTE — Clinical Note
Jaiden Mehta was seen and treated in our emergency department on 9/10/2022  Diagnosis:     Linus Cazares  may return to work on return date  She may return on this date: 09/19/2022         If you have any questions or concerns, please don't hesitate to call        Arlen Kayser, MD    ______________________________           _______________          _______________  Hospital Representative                              Date                                Time

## 2022-09-10 NOTE — ED PROVIDER NOTES
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 c/o 3 weeks of constant severe L sided facial pain and neck pain and subjective L facial swelling  She has seen the dentist, oral surgeon, ENT doctor, PCP and ED for this pain  She states she has had extensive evaluation without evident abnormality  She was prescribed tegretol by the ED for presumed trigeminal neuralgia but she has not taken this medicine  No relief from taking amoxicillin  She tells me that her PCP told her NOT to take this medicine although the documentation in epic indicates she was counseled to take this medicine  She states at one point in our conversation that she had improvement with tylenol but later states it provided no relief  She demands admission to the hospital for further evaluation  Prior to Admission Medications   Prescriptions Last Dose Informant Patient Reported? Taking?    Cetirizine HCl (ZyrTEC Allergy) 10 MG CAPS   Yes No   carbamazepine (CARBATROL) 100 MG 12 hr capsule   No No   Sig: Take 1 capsule (100 mg total) by mouth 2 (two) times a day After 3 days, take 2 tablets twice per day   diazepam (VALIUM) 5 mg tablet   No No   Sig: Take 1 tablet (5 mg total) by mouth every 8 (eight) hours as needed for anxiety   ergocalciferol (VITAMIN D2) 50,000 units  Self No No   Sig: Take 1 capsule (50,000 Units total) by mouth once a week   ibuprofen (MOTRIN) 800 mg tablet  Self Yes No   Sig: Take 1 tablet by mouth 3 (three) times a day as needed   montelukast (SINGULAIR) 10 mg tablet  Self No No   Sig: Take 1 tablet (10 mg total) by mouth daily at bedtime   predniSONE 20 mg tablet   No No   Sig: 3 tabs by mouth daily x 3 days, 2 tabs by mouth daily x 3 days, then 1 tab by mouth daily x 3 days Facility-Administered Medications: None       Past Medical History:   Diagnosis Date    Allergic rhinitis     Brachial plexopathy     radiculopathy    Concussion 2018    mild memory deficit    Dizziness and giddiness     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       Past Surgical History:   Procedure Laterality Date     SECTION      x2       Family History   Problem Relation Age of Onset    Diabetes type II Mother     Deafness Father     Depression Father     Asthma Son      I have reviewed and agree with the history as documented  E-Cigarette/Vaping    E-Cigarette Use Never User      E-Cigarette/Vaping Substances    Nicotine No     THC No     CBD No     Flavoring No     Other No     Unknown No      Social History     Tobacco Use    Smoking status: Never Smoker    Smokeless tobacco: Never Used   Vaping Use    Vaping Use: Never used   Substance Use Topics    Alcohol use: Yes     Comment: social    Drug use: No       Review of Systems   HENT: Positive for ear pain and facial swelling  All other systems reviewed and are negative  Physical Exam  Physical Exam  Vitals and nursing note reviewed  Constitutional:       General: She is not in acute distress  Appearance: Normal appearance  She is well-developed  She is not ill-appearing, toxic-appearing or diaphoretic  HENT:      Head: Normocephalic and atraumatic  Right Ear: Tympanic membrane normal       Left Ear: Tympanic membrane, ear canal and external ear normal  There is no impacted cerumen  Ears:      Comments: eac normal  No erythema  No drainage  Normal tm  Normal mastoid process  No cellulitis or swelling or lymphadenopathy of neck or scalp  Eyes:      General:         Right eye: No discharge  Left eye: No discharge  Conjunctiva/sclera: Conjunctivae normal       Pupils: Pupils are equal, round, and reactive to light  Neck:      Vascular: No carotid bruit or JVD     Pulmonary: Effort: Pulmonary effort is normal  No respiratory distress  Breath sounds: No stridor  Musculoskeletal:         General: No tenderness or deformity  Normal range of motion  Cervical back: Normal range of motion and neck supple  No rigidity or tenderness  Lymphadenopathy:      Cervical: No cervical adenopathy  Skin:     General: Skin is warm and dry  Capillary Refill: Capillary refill takes less than 2 seconds  Coloration: Skin is not jaundiced or pale  Findings: No bruising, erythema, lesion or rash  Neurological:      General: No focal deficit present  Mental Status: She is alert and oriented to person, place, and time  Cranial Nerves: No cranial nerve deficit  Sensory: No sensory deficit  Motor: No weakness or abnormal muscle tone  Coordination: Coordination normal          Vital Signs  ED Triage Vitals   Temperature Pulse Respirations Blood Pressure SpO2   09/10/22 1038 09/10/22 1038 09/10/22 1038 09/10/22 1038 09/10/22 1038   98 7 °F (37 1 °C) 100 16 139/85 98 %      Temp Source Heart Rate Source Patient Position - Orthostatic VS BP Location FiO2 (%)   09/10/22 1130 09/10/22 1130 09/10/22 1130 09/10/22 1130 --   Oral Monitor Sitting Right arm       Pain Score       --                  Vitals:    09/10/22 1038 09/10/22 1130   BP: 139/85 136/91   Pulse: 100 103   Patient Position - Orthostatic VS:  Sitting         Visual Acuity      ED Medications  Medications - No data to display    Diagnostic Studies  Results Reviewed     None                 No orders to display              Procedures  Procedures         ED Course  ED Course as of 09/10/22 1652   Sat Sep 10, 2022   1201 Pt refusing dilaudid  She instead requests toradol  SBIRT 22yo+    Flowsheet Row Most Recent Value   SBIRT (25 yo +)    In order to provide better care to our patients, we are screening all of our patients for alcohol and drug use   Would it be okay to ask you these screening questions? Yes Filed at: 09/10/2022 1106   Initial Alcohol Screen: US AUDIT-C     1  How often do you have a drink containing alcohol? 0 Filed at: 09/10/2022 1106   2  How many drinks containing alcohol do you have on a typical day you are drinking? 0 Filed at: 09/10/2022 1106   3b  FEMALE Any Age, or MALE 65+: How often do you have 4 or more drinks on one occassion? 0 Filed at: 09/10/2022 1106   Audit-C Score 0 Filed at: 09/10/2022 1106   MONROE: How many times in the past year have you    Used an illegal drug or used a prescription medication for non-medical reasons? Never Filed at: 09/10/2022 1106                    MDM  Number of Diagnoses or Management Options  Left facial pain  Diagnosis management comments: 3 weeks of L facial pain  Suspect trigeminal neuralgia  Pt has not filled the medicine prescribed specifically to treat this condition  She demands that I admit her due to continued pain  I advised her that this is not indicated nor is it appropriate to admit her to treat a condition that she has not attempted to treat as directed by medical professionals  She states she can not take morphine because she needs to take care of her kids but she also demands to be admitted  Mother at bedside also demands admission and states she does not "like" my opinion  I attempted to empathize and demonstrated that I recognize her frustration and ongoing pain but I can not admit her to the hospital for this, particularly given that she has not yet taken the tegretol  Ultimately pt and her mother very unsatisfied by my opinion/recommendation/care but attempts at resolving this were ineffective         Disposition  Final diagnoses:   Left facial pain     Time reflects when diagnosis was documented in both MDM as applicable and the Disposition within this note     Time User Action Codes Description Comment    9/10/2022 11:28 AM Anders Wall Add [R51 9] Left facial pain       ED Disposition     ED Disposition   Discharge    Condition   Stable    Date/Time   Sat Sep 10, 2022 1128    Comment   Any Seaman discharge to home/self care  Follow-up Information     Follow up With Specialties Details Why Billy Doherty MD Neurology  for further evaluation of your facial pain [facility] [address]  [phone]            Patient's Medications   Discharge Prescriptions    MORPHINE (MSIR) 15 MG TABLET    Take 1 tablet (15 mg total) by mouth every 4 (four) hours as needed for severe pain for up to 20 doses Max Daily Amount: 90 mg       Start Date: 9/10/2022 End Date: --       Order Dose: 15 mg       Quantity: 20 tablet    Refills: 0       No discharge procedures on file      PDMP Review     None          ED Provider  Electronically Signed by           Aparna West MD  09/10/22 9487

## 2022-09-10 NOTE — ED NOTES
Pt states that she is depressed, feeling very helpless and like no one is listening to her  Pt requests to speak with hospital supervisor  Charge nurse made aware       1222 LUAN Oscar RN  09/10/22 8764

## 2022-09-10 NOTE — ED NOTES
Pt requesting to speak with hospital supervisor, c/o pain, appears tearful,  provider and  supervisor aware and at bedside        Cher Reid RN  09/10/22 0121

## 2022-09-19 DIAGNOSIS — G50.9 IDIOPATHIC TRIGEMINAL NEUROPATHY: Primary | ICD-10-CM

## 2022-09-19 DIAGNOSIS — J35.1 TONSILLAR HYPERTROPHY: ICD-10-CM

## 2022-09-19 DIAGNOSIS — R51.9 FACIAL PAIN: ICD-10-CM

## 2022-09-22 ENCOUNTER — HOSPITAL ENCOUNTER (OUTPATIENT)
Dept: CT IMAGING | Facility: HOSPITAL | Age: 32
End: 2022-09-22
Payer: COMMERCIAL

## 2022-09-22 ENCOUNTER — TELEMEDICINE (OUTPATIENT)
Dept: FAMILY MEDICINE CLINIC | Facility: CLINIC | Age: 32
End: 2022-09-22
Payer: COMMERCIAL

## 2022-09-22 DIAGNOSIS — R51.9 LEFT FACIAL PAIN: ICD-10-CM

## 2022-09-22 DIAGNOSIS — G50.9 IDIOPATHIC TRIGEMINAL NEUROPATHY: ICD-10-CM

## 2022-09-22 DIAGNOSIS — F41.9 ANXIETY: Primary | ICD-10-CM

## 2022-09-22 PROCEDURE — G1004 CDSM NDSC: HCPCS

## 2022-09-22 PROCEDURE — 99214 OFFICE O/P EST MOD 30 MIN: CPT | Performed by: NURSE PRACTITIONER

## 2022-09-22 PROCEDURE — 70496 CT ANGIOGRAPHY HEAD: CPT

## 2022-09-22 PROCEDURE — 70498 CT ANGIOGRAPHY NECK: CPT

## 2022-09-22 RX ADMIN — IOHEXOL 85 ML: 350 INJECTION, SOLUTION INTRAVENOUS at 09:05

## 2022-09-22 NOTE — PROGRESS NOTES
Virtual Regular Visit    Verification of patient location:    Patient is located in the following state in which I hold an active license [place]      Assessment/Plan:    Problem List Items Addressed This Visit        Nervous and Auditory    Idiopathic trigeminal neuropathy     Will obtain CT scan of head and neck, to rule out any underlying causes  She has a neurology appointment on Monday  She has Norco at home from with some teeth extraction, she will try taking this medication for pain relief  Relevant Orders    CTA head and neck w wo contrast       Other    Anxiety - Primary     Has been escalated due to facial pain, ear pain, tonsil pain for the last month  Taking Valium twice daily with some relief  Other Visit Diagnoses     Left facial pain        Relevant Orders    CTA head and neck w wo contrast               Reason for visit is   Chief Complaint   Patient presents with    Medication Management     Pt was given morphine 15 mg - does not want to take she has kids to take care of     Pain     C/o pain in face and neck         Encounter provider TAMMY Nelson    Provider located at [facility]  [address] [place], [place] [id number] [phone]      Recent Visits  No visits were found meeting these conditions  Showing recent visits within past 7 days and meeting all other requirements  Today's Visits  Date Type Provider Dept   09/22/22 Telemedicine TAMMY [address] today's visits and meeting all other requirements  Future Appointments  No visits were found meeting these conditions  Showing future appointments within next 150 days and meeting all other requirements       The patient was identified by name and date of birth   Jennifer Renee was informed that this is a telemedicine visit and that the visit is being conducted through [facility] and patient was informed this is a secure, HIPAA-complaint platform  She agrees to proceed     My office door was closed  No one else was in the room  She acknowledged consent and understanding of privacy and security of the video platform  The patient has agreed to participate and understands they can discontinue the visit at any time  Patient is aware this is a billable service  Subjective  Angelica Wisdom is a 33 y o  female who presents today for follow up from ED  She was seen in ED for facial pain, pain in jaw, ear, and into neck  She was discharged on morphine, she then went to ED on 9/10  She was previously diagnosed with atypical trigeminal neurcaalgia    She has not yet started carbamazepine, awaiting neurology         HPI     Past Medical History:   Diagnosis Date    Allergic rhinitis     Brachial plexopathy     radiculopathy    Concussion     mild memory deficit    Dizziness and giddiness     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       Past Surgical History:   Procedure Laterality Date     SECTION      x2       Current Outpatient Medications   Medication Sig Dispense Refill    Cetirizine HCl (ZyrTEC Allergy) 10 MG CAPS       diazepam (VALIUM) 5 mg tablet Take 1 tablet (5 mg total) by mouth every 8 (eight) hours as needed for anxiety 30 tablet 0    ergocalciferol (VITAMIN D2) 50,000 units Take 1 capsule (50,000 Units total) by mouth once a week 12 capsule 1    ibuprofen (MOTRIN) 800 mg tablet Take 1 tablet by mouth 3 (three) times a day as needed      montelukast (SINGULAIR) 10 mg tablet Take 1 tablet (10 mg total) by mouth daily at bedtime 30 tablet 1    carbamazepine (CARBATROL) 100 MG 12 hr capsule Take 1 capsule (100 mg total) by mouth 2 (two) times a day After 3 days, take 2 tablets twice per day (Patient not taking: Reported on 2022) 40 capsule 0    morphine (MSIR) 15 mg tablet Take 1 tablet (15 mg total) by mouth every 4 (four) hours as needed for severe pain for up to 20 doses Max Daily Amount: 90 mg (Patient not taking: Reported on 9/22/2022) 20 tablet 0    predniSONE 20 mg tablet 3 tabs by mouth daily x 3 days, 2 tabs by mouth daily x 3 days, then 1 tab by mouth daily x 3 days (Patient not taking: Reported on 9/22/2022) 18 tablet 1     No current facility-administered medications for this visit  Allergies   Allergen Reactions    Fioricet [Butalbital-Apap-Caffeine] Itching    Fioricet [Butalbital-Apap-Caffeine]        Review of Systems   Constitutional: Negative for activity change, chills, fatigue and fever  HENT: Positive for ear pain  Negative for congestion, ear discharge, sinus pressure, sinus pain, sore throat, tinnitus and trouble swallowing  Left facial pain   Eyes: Negative for photophobia, pain, discharge, itching and visual disturbance  Respiratory: Negative for cough, chest tightness, shortness of breath and wheezing  Cardiovascular: Negative for chest pain and leg swelling  Gastrointestinal: Negative for abdominal distention, abdominal pain, constipation, diarrhea, nausea and vomiting  Endocrine: Negative for polydipsia, polyphagia and polyuria  Genitourinary: Negative for dysuria and frequency  Musculoskeletal: Negative for arthralgias, myalgias, neck pain and neck stiffness  Skin: Negative for color change  Neurological: Negative for dizziness, syncope, weakness, numbness and headaches  Hematological: Does not bruise/bleed easily  Psychiatric/Behavioral: Positive for sleep disturbance  Negative for behavioral problems, confusion, self-injury and suicidal ideas  The patient is not nervous/anxious  Video Exam    There were no vitals filed for this visit  Physical Exam  Constitutional:       Appearance: Normal appearance  HENT:      Head: Normocephalic and atraumatic  Pulmonary:      Effort: Pulmonary effort is normal    Neurological:      General: No focal deficit present        Mental Status: She is alert and oriented to person, place, and time  Psychiatric:         Mood and Affect: Affect is tearful            I spent 15 minutes with patient today in which greater than 50% of the time was spent in counseling/coordination of care regarding treatment plan

## 2022-09-22 NOTE — ASSESSMENT & PLAN NOTE
Has been escalated due to facial pain, ear pain, tonsil pain for the last month  Taking Valium twice daily with some relief

## 2022-09-22 NOTE — RESULT ENCOUNTER NOTE
Please let her know her CT scan of head and neck were normal, I will let Neurology weigh in on MRI on Monday at her appt

## 2022-09-22 NOTE — ASSESSMENT & PLAN NOTE
Will obtain CT scan of head and neck, to rule out any underlying causes  She has a neurology appointment on Monday  She has Norco at home from with some teeth extraction, she will try taking this medication for pain relief

## 2022-10-03 ENCOUNTER — TELEMEDICINE (OUTPATIENT)
Dept: FAMILY MEDICINE CLINIC | Facility: CLINIC | Age: 32
End: 2022-10-03
Payer: COMMERCIAL

## 2022-10-03 DIAGNOSIS — G50.9 IDIOPATHIC TRIGEMINAL NEUROPATHY: Primary | ICD-10-CM

## 2022-10-03 DIAGNOSIS — F41.9 ANXIETY: ICD-10-CM

## 2022-10-03 DIAGNOSIS — G50.1 FACIAL PAIN, ATYPICAL: ICD-10-CM

## 2022-10-03 PROCEDURE — 99214 OFFICE O/P EST MOD 30 MIN: CPT | Performed by: NURSE PRACTITIONER

## 2022-10-03 NOTE — PROGRESS NOTES
Virtual Regular Visit    Verification of patient location:    Patient is located in the following state in which I hold an active license [place]      Assessment/Plan:    Problem List Items Addressed This Visit        Nervous and Auditory    Idiopathic trigeminal neuropathy - Primary     CT scan was obtained, was advised MRI trigeminal neuralgia protocol  Ordered today  She remains with left facial cheek area pain, has not triedc carbamazepine, will start today          Relevant Orders    MRI brain wo contrast       Other    Anxiety     Has been escalating since facial pain, unknown cause, unknown definitive diagnoses  Has been using valium with some relief  Facial pain, atypical     She has been experiencing facial pain, left side, unknown if this is related to a trigeminal neuralgia or tooth issue  Does have an appt thursday for root canal                     Reason for visit is   Chief Complaint   Patient presents with    Follow-up     Requesting MRI - pt following up wit the idiopathic trigeminal neuropathy         Enc[facility] provider [facility]    Provider located at [address]  [facility] [place] [address]  [phone]      Recent Visits  No visits were found meeting these conditions  Showing recent visits within past 7 days and meeting all other requirements  Today's Visits  Date Type Provider Dept   10/03/22 Telemedicine TAMMY [address] today's visits and meeting all other requirements  Future Appointments  No visits were found meeting these conditions  Showing future appointments within next 150 days and meeting all other requirements       The patient was identified by name and date of birth  Terry Esteban was informed that this is a telemedicine visit and that the visit is being conducted through [facility] and patient was informed this is a secure, HIPAA-complaint platform   She agrees to proceed     My office door was closed  No one else was in the room  She acknowledged consent and understanding of privacy and security of the video platform  The patient has agreed to participate and understands they can discontinue the visit at any time  Patient is aware this is a billable service  Subjective  Louise Mendoza is a 33 y o  female who presents today for follow up  She was recently seen by neurology, was told this was musculoskeletal    She has left facial pain, spreading into jaw area  She has had a thorough workup, including wisdom teeth removal  She has seen ENT also  She was given valium ,flexeril  She reports intense pain to the area, left cheek         HPI     Past Medical History:   Diagnosis Date    Allergic rhinitis     Brachial plexopathy     radiculopathy    Concussion 2018    mild memory deficit    Dizziness and giddiness     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       Past Surgical History:   Procedure Laterality Date     SECTION      x2       Current Outpatient Medications   Medication Sig Dispense Refill    Cetirizine HCl (ZyrTEC Allergy) 10 MG CAPS       diazepam (VALIUM) 5 mg tablet Take 1 tablet (5 mg total) by mouth every 8 (eight) hours as needed for anxiety 30 tablet 0    ergocalciferol (VITAMIN D2) 50,000 units Take 1 capsule (50,000 Units total) by mouth once a week 12 capsule 1    ibuprofen (MOTRIN) 800 mg tablet Take 1 tablet by mouth 3 (three) times a day as needed      montelukast (SINGULAIR) 10 mg tablet Take 1 tablet (10 mg total) by mouth daily at bedtime 30 tablet 1    carbamazepine (CARBATROL) 100 MG 12 hr capsule Take 1 capsule (100 mg total) by mouth 2 (two) times a day After 3 days, take 2 tablets twice per day (Patient not taking: No sig reported) 40 capsule 0    morphine (MSIR) 15 mg tablet Take 1 tablet (15 mg total) by mouth every 4 (four) hours as needed for severe pain for up to 20 doses Max Daily Amount: 90 mg (Patient not taking: No sig reported) 20 tablet 0    predniSONE 20 mg tablet 3 tabs by mouth daily x 3 days, 2 tabs by mouth daily x 3 days, then 1 tab by mouth daily x 3 days (Patient not taking: No sig reported) 18 tablet 1     No current facility-administered medications for this visit  Allergies   Allergen Reactions    Fioricet [Butalbital-Apap-Caffeine] Itching    Fioricet [Butalbital-Apap-Caffeine]        Review of Systems   Constitutional: Positive for activity change and appetite change  HENT: Positive for dental problem  Musculoskeletal: Positive for myalgias, neck pain and neck stiffness  Psychiatric/Behavioral: Positive for sleep disturbance  The patient is nervous/anxious  Video Exam    There were no vitals filed for this visit  Physical Exam  Constitutional:       Appearance: Normal appearance  HENT:      Head: Normocephalic and atraumatic  Pulmonary:      Effort: Pulmonary effort is normal    Neurological:      General: No focal deficit present  Mental Status: She is alert and oriented to person, place, and time  Psychiatric:         Mood and Affect: Mood is anxious  Affect is tearful           Behavior: Behavior normal           I spent 15 minutes with patient today in which greater than 50% of the time was spent in counseling/coordination of care regarding treatment plan

## 2022-10-03 NOTE — ASSESSMENT & PLAN NOTE
She has been experiencing facial pain, left side, unknown if this is related to a trigeminal neuralgia or tooth issue   Does have an appt thursday for root canal

## 2022-10-03 NOTE — ASSESSMENT & PLAN NOTE
Has been escalating since facial pain, unknown cause, unknown definitive diagnoses  Has been using valium with some relief

## 2022-10-03 NOTE — ASSESSMENT & PLAN NOTE
CT scan was obtained, was advised MRI trigeminal neuralgia protocol  Ordered today    She remains with left facial cheek area pain, has not triedc carbamazepine, will start today

## 2022-10-09 ENCOUNTER — HOSPITAL ENCOUNTER (OUTPATIENT)
Dept: MRI IMAGING | Facility: HOSPITAL | Age: 32
Discharge: HOME | End: 2022-10-09
Payer: COMMERCIAL

## 2022-10-09 DIAGNOSIS — M54.2 CERVICALGIA: ICD-10-CM

## 2022-10-09 PROCEDURE — 72141 MRI NECK SPINE W/O DYE: CPT

## 2022-10-09 PROCEDURE — G1004 CDSM NDSC: HCPCS

## 2022-10-10 ENCOUNTER — HOSPITAL ENCOUNTER (OUTPATIENT)
Dept: MRI IMAGING | Facility: HOSPITAL | Age: 32
Discharge: HOME | End: 2022-10-10
Payer: COMMERCIAL

## 2022-10-10 DIAGNOSIS — G50.9 IDIOPATHIC TRIGEMINAL NEUROPATHY: ICD-10-CM

## 2022-10-10 PROCEDURE — G1004 CDSM NDSC: HCPCS

## 2022-10-10 PROCEDURE — 70553 MRI BRAIN STEM W/O & W/DYE: CPT

## 2022-10-10 PROCEDURE — A9585 GADOBUTROL INJECTION: HCPCS | Performed by: NURSE PRACTITIONER

## 2022-10-10 RX ADMIN — GADOBUTROL 7.5 ML: 604.72 INJECTION INTRAVENOUS at 07:33

## 2022-10-17 ENCOUNTER — EVALUATION (OUTPATIENT)
Dept: PHYSICAL THERAPY | Age: 32
End: 2022-10-17
Payer: COMMERCIAL

## 2022-10-17 DIAGNOSIS — R51.9 LEFT-SIDED FACE PAIN: ICD-10-CM

## 2022-10-17 DIAGNOSIS — M79.18 MUSCULOSKELETAL PAIN: Primary | ICD-10-CM

## 2022-10-17 PROCEDURE — 97162 PT EVAL MOD COMPLEX 30 MIN: CPT | Performed by: PHYSICAL THERAPIST

## 2022-10-17 PROCEDURE — 97140 MANUAL THERAPY 1/> REGIONS: CPT | Performed by: PHYSICAL THERAPIST
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
## 2022-10-20 ENCOUNTER — OFFICE VISIT (OUTPATIENT)
Dept: PHYSICAL THERAPY | Age: 32
End: 2022-10-20
Payer: COMMERCIAL

## 2022-10-20 DIAGNOSIS — M79.18 MUSCULOSKELETAL PAIN: Primary | ICD-10-CM

## 2022-10-20 DIAGNOSIS — R51.9 LEFT-SIDED FACE PAIN: ICD-10-CM

## 2022-10-20 PROCEDURE — 97140 MANUAL THERAPY 1/> REGIONS: CPT | Performed by: PHYSICAL THERAPIST

## 2022-10-20 PROCEDURE — 97110 THERAPEUTIC EXERCISES: CPT | Performed by: PHYSICAL THERAPIST
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
## 2022-10-22 PROBLEM — Z13.1 SCREENING FOR DIABETES MELLITUS: Status: RESOLVED | Noted: 2022-08-23 | Resolved: 2022-10-22

## 2022-10-26 ENCOUNTER — OFFICE VISIT (OUTPATIENT)
Dept: PHYSICAL THERAPY | Age: 32
End: 2022-10-26
Payer: COMMERCIAL

## 2022-10-26 DIAGNOSIS — R51.9 LEFT-SIDED FACE PAIN: ICD-10-CM

## 2022-10-26 DIAGNOSIS — M79.18 MUSCULOSKELETAL PAIN: Primary | ICD-10-CM

## 2022-10-26 PROCEDURE — 97140 MANUAL THERAPY 1/> REGIONS: CPT
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
## 2022-10-27 ENCOUNTER — APPOINTMENT (OUTPATIENT)
Dept: LAB | Facility: HOSPITAL | Age: 32
End: 2022-10-27
Payer: COMMERCIAL

## 2022-10-27 DIAGNOSIS — E61.1 IRON DEFICIENCY: ICD-10-CM

## 2022-10-27 DIAGNOSIS — R53.82 CHRONIC FATIGUE: ICD-10-CM

## 2022-10-27 DIAGNOSIS — E55.9 VITAMIN D DEFICIENCY: ICD-10-CM

## 2022-10-27 LAB
25(OH)D3 SERPL-MCNC: 30.8 NG/ML (ref 30–100)
ALBUMIN SERPL BCP-MCNC: 4.5 G/DL (ref 3.5–5)
ALP SERPL-CCNC: 67 U/L (ref 34–104)
ALT SERPL W P-5'-P-CCNC: 25 U/L (ref 7–52)
ANION GAP SERPL CALCULATED.3IONS-SCNC: 10 MMOL/L (ref 4–13)
AST SERPL W P-5'-P-CCNC: 18 U/L (ref 13–39)
BASOPHILS # BLD AUTO: 0.03 THOUSANDS/ÂΜL (ref 0–0.1)
BASOPHILS NFR BLD AUTO: 0 % (ref 0–1)
BILIRUB SERPL-MCNC: 0.83 MG/DL (ref 0.2–1)
BUN SERPL-MCNC: 8 MG/DL (ref 5–25)
CALCIUM SERPL-MCNC: 9.6 MG/DL (ref 8.4–10.2)
CHLORIDE SERPL-SCNC: 101 MMOL/L (ref 96–108)
CO2 SERPL-SCNC: 27 MMOL/L (ref 21–32)
CREAT SERPL-MCNC: 0.63 MG/DL (ref 0.6–1.3)
EOSINOPHIL # BLD AUTO: 0.07 THOUSAND/ÂΜL (ref 0–0.61)
EOSINOPHIL NFR BLD AUTO: 1 % (ref 0–6)
ERYTHROCYTE [DISTWIDTH] IN BLOOD BY AUTOMATED COUNT: 14.1 % (ref 11.6–15.1)
FERRITIN SERPL-MCNC: 44 NG/ML (ref 8–388)
GFR SERPL CREATININE-BSD FRML MDRD: 118 ML/MIN/1.73SQ M
GLUCOSE P FAST SERPL-MCNC: 119 MG/DL (ref 65–99)
HCT VFR BLD AUTO: 42.9 % (ref 34.8–46.1)
HGB BLD-MCNC: 13.9 G/DL (ref 11.5–15.4)
IMM GRANULOCYTES # BLD AUTO: 0.04 THOUSAND/UL (ref 0–0.2)
IMM GRANULOCYTES NFR BLD AUTO: 0 % (ref 0–2)
IRON SATN MFR SERPL: 28 % (ref 15–50)
IRON SERPL-MCNC: 91 UG/DL (ref 50–170)
LYMPHOCYTES # BLD AUTO: 2.52 THOUSANDS/ÂΜL (ref 0.6–4.47)
LYMPHOCYTES NFR BLD AUTO: 19 % (ref 14–44)
MCH RBC QN AUTO: 29.1 PG (ref 26.8–34.3)
MCHC RBC AUTO-ENTMCNC: 32.4 G/DL (ref 31.4–37.4)
MCV RBC AUTO: 90 FL (ref 82–98)
MONOCYTES # BLD AUTO: 0.48 THOUSAND/ÂΜL (ref 0.17–1.22)
MONOCYTES NFR BLD AUTO: 4 % (ref 4–12)
NEUTROPHILS # BLD AUTO: 10.23 THOUSANDS/ÂΜL (ref 1.85–7.62)
NEUTS SEG NFR BLD AUTO: 76 % (ref 43–75)
NRBC BLD AUTO-RTO: 0 /100 WBCS
PLATELET # BLD AUTO: 394 THOUSANDS/UL (ref 149–390)
PMV BLD AUTO: 8.8 FL (ref 8.9–12.7)
POTASSIUM SERPL-SCNC: 3.6 MMOL/L (ref 3.5–5.3)
PROT SERPL-MCNC: 7.8 G/DL (ref 6.4–8.4)
RBC # BLD AUTO: 4.78 MILLION/UL (ref 3.81–5.12)
SODIUM SERPL-SCNC: 138 MMOL/L (ref 135–147)
TIBC SERPL-MCNC: 327 UG/DL (ref 250–450)
TSH SERPL DL<=0.05 MIU/L-ACNC: 0.86 UIU/ML (ref 0.45–4.5)
WBC # BLD AUTO: 13.37 THOUSAND/UL (ref 4.31–10.16)

## 2022-10-27 PROCEDURE — 80053 COMPREHEN METABOLIC PANEL: CPT

## 2022-10-27 PROCEDURE — 83540 ASSAY OF IRON: CPT

## 2022-10-27 PROCEDURE — 84443 ASSAY THYROID STIM HORMONE: CPT

## 2022-10-27 PROCEDURE — 85025 COMPLETE CBC W/AUTO DIFF WBC: CPT

## 2022-10-27 PROCEDURE — 36415 COLL VENOUS BLD VENIPUNCTURE: CPT

## 2022-10-27 PROCEDURE — 82306 VITAMIN D 25 HYDROXY: CPT

## 2022-10-27 PROCEDURE — 82728 ASSAY OF FERRITIN: CPT

## 2022-10-27 PROCEDURE — 83550 IRON BINDING TEST: CPT

## 2022-10-28 ENCOUNTER — OFFICE VISIT (OUTPATIENT)
Dept: PHYSICAL THERAPY | Age: 32
End: 2022-10-28
Payer: COMMERCIAL

## 2022-10-28 DIAGNOSIS — M79.18 MUSCULOSKELETAL PAIN: Primary | ICD-10-CM

## 2022-10-28 DIAGNOSIS — R51.9 LEFT-SIDED FACE PAIN: ICD-10-CM

## 2022-10-28 PROCEDURE — 97140 MANUAL THERAPY 1/> REGIONS: CPT | Performed by: PHYSICAL THERAPIST
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
## 2022-10-31 ENCOUNTER — OFFICE VISIT (OUTPATIENT)
Dept: PHYSICAL THERAPY | Age: 32
End: 2022-10-31

## 2022-10-31 ENCOUNTER — OFFICE VISIT (OUTPATIENT)
Dept: FAMILY MEDICINE CLINIC | Facility: CLINIC | Age: 32
End: 2022-10-31

## 2022-10-31 VITALS
WEIGHT: 212.4 LBS | OXYGEN SATURATION: 99 % | BODY MASS INDEX: 36.26 KG/M2 | RESPIRATION RATE: 18 BRPM | TEMPERATURE: 98.2 F | SYSTOLIC BLOOD PRESSURE: 110 MMHG | HEIGHT: 64 IN | HEART RATE: 85 BPM | DIASTOLIC BLOOD PRESSURE: 60 MMHG

## 2022-10-31 DIAGNOSIS — R53.82 CHRONIC FATIGUE: ICD-10-CM

## 2022-10-31 DIAGNOSIS — K80.20 GALLSTONES: ICD-10-CM

## 2022-10-31 DIAGNOSIS — R51.9 LEFT-SIDED FACE PAIN: ICD-10-CM

## 2022-10-31 DIAGNOSIS — F41.9 ANXIETY: Primary | ICD-10-CM

## 2022-10-31 DIAGNOSIS — F11.20 CONTINUOUS OPIOID DEPENDENCE (HCC): ICD-10-CM

## 2022-10-31 DIAGNOSIS — M79.18 MUSCULOSKELETAL PAIN: Primary | ICD-10-CM

## 2022-10-31 RX ORDER — LORAZEPAM 0.5 MG/1
0.5 TABLET ORAL EVERY 8 HOURS PRN
Qty: 60 TABLET | Refills: 0 | Status: SHIPPED | OUTPATIENT
Start: 2022-10-31

## 2022-10-31 RX ORDER — METHYLPREDNISOLONE 4 MG/1
TABLET ORAL
COMMUNITY
Start: 2022-10-07 | End: 2022-10-31 | Stop reason: ALTCHOICE

## 2022-10-31 RX ORDER — LORAZEPAM 0.5 MG/1
TABLET ORAL
COMMUNITY
Start: 2022-10-07 | End: 2022-10-31 | Stop reason: SDUPTHER
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
## 2022-10-31 NOTE — PROGRESS NOTES
OFFICE VISIT  Kelly Schrader 33 y o  female MRN: [id number]          Assessment / Plan:  Problem List Items Addressed This Visit        Other    Anxiety - Primary    Relevant Medications    LORazepam (ATIVAN) 0 5 mg tablet    Chronic fatigue    Relevant Orders    CBC and differential    Comprehensive metabolic panel    TSH, 3rd generation with Free T4 reflex    RESOLVED: Continuous opioid dependence (HCC)     Was prescribed morphine by ED, did not fill script, dx removed from problem list             Other Visit Diagnoses     Gallstones        Relevant Orders    US abdomen complete            Reason For Visit / Chief Complaint  Chief Complaint   Patient presents with   • Follow-up     Blood work and not feeling well, dizzy tired, nauseous,         HPI:  Kelly Schrader is a 33 y o  female who presents today for questions about prednisone  She reports fatigue and dizziness   She is fearful she is going through withdrawal from prednisone, she has been prescribed steroids on and off over the last few months along with nasal spray          Historical Information   Past Medical History:   Diagnosis Date   • Allergic rhinitis    • Brachial plexopathy     radiculopathy   • Concussion 2018    mild memory deficit   • Dizziness and giddiness    • 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     Past Surgical History:   Procedure Laterality Date   •  SECTION      x2     Social History   Social History     Substance and Sexual Activity   Alcohol Use Yes    Comment: social     Social History     Substance and Sexual Activity   Drug Use No     Social History     Tobacco Use   Smoking Status Never Smoker   Smokeless Tobacco Never Used     Family History   Problem Relation Age of Onset   • Diabetes type II Mother    • Deafness Father    • Depression Father    • Asthma Son        Meds/Allergies   Allergies   Allergen Reactions   • Fioricet [Butalbital-Apap-Caffeine] Itching   • Fioricet [Butalbital-Apap-Caffeine] Meds:    Current Outpatient Medications:   •  Cetirizine HCl (ZyrTEC Allergy) 10 MG CAPS, , Disp: , Rfl:   •  ibuprofen (MOTRIN) 800 mg tablet, Take 1 tablet by mouth 3 (three) times a day as needed, Disp: , Rfl:   •  LORazepam (ATIVAN) 0 5 mg tablet, Take 1 tablet (0 5 mg total) by mouth every 8 (eight) hours as needed for anxiety, Disp: 60 tablet, Rfl: 0  •  carbamazepine (CARBATROL) 100 MG 12 hr capsule, Take 1 capsule (100 mg total) by mouth 2 (two) times a day After 3 days, take 2 tablets twice per day (Patient not taking: No sig reported), Disp: 40 capsule, Rfl: 0  •  diazepam (VALIUM) 5 mg tablet, Take 1 tablet (5 mg total) by mouth every 8 (eight) hours as needed for anxiety (Patient not taking: Reported on 10/31/2022), Disp: 30 tablet, Rfl: 0  •  ergocalciferol (VITAMIN D2) 50,000 units, Take 1 capsule (50,000 Units total) by mouth once a week (Patient not taking: Reported on 10/31/2022), Disp: 12 capsule, Rfl: 1  •  montelukast (SINGULAIR) 10 mg tablet, Take 1 tablet (10 mg total) by mouth daily at bedtime (Patient not taking: Reported on 10/31/2022), Disp: 30 tablet, Rfl: 1      REVIEW OF SYSTEMS  Review of Systems   Constitutional: Positive for activity change and fatigue  Negative for appetite change  HENT: Negative for congestion, dental problem, ear discharge, ear pain, facial swelling, rhinorrhea, sinus pressure, sinus pain, sore throat and tinnitus  Respiratory: Negative for cough and shortness of breath  Cardiovascular: Negative for chest pain  Gastrointestinal: Positive for nausea  Neurological: Positive for dizziness  Psychiatric/Behavioral: Positive for sleep disturbance  The patient is nervous/anxious              Current Vitals:   Blood Pressure: 110/60 (10/31/22 1204)  Pulse: 85 (10/31/22 1204)  Temperature: 98 2 °F (36 8 °C) (10/31/22 1204)  Respirations: 18 (10/31/22 1204)  Height: 5' 4" (162 6 cm) (10/31/22 1204)  Weight - Scale: 96 3 kg (212 lb 6 4 oz) (10/31/22 1204)  SpO2: 99 % (10/31/22 1204)  [unfilled]    PHYSICAL EXAMS:  Physical Exam  Vitals and nursing note reviewed  Constitutional:       Appearance: She is obese  Cardiovascular:      Rate and Rhythm: Normal rate and regular rhythm  Pulses: Normal pulses  Heart sounds: Normal heart sounds  Pulmonary:      Effort: Pulmonary effort is normal       Breath sounds: Normal breath sounds  Musculoskeletal:      Cervical back: Normal range of motion  Neurological:      General: No focal deficit present  Mental Status: She is alert and oriented to person, place, and time  Psychiatric:         Mood and Affect: Mood normal          Behavior: Behavior normal              Lab, imaging and other studies: I have personally reviewed pertinent reports  Obed Jacobson

## 2022-11-02 ENCOUNTER — HOSPITAL ENCOUNTER (OUTPATIENT)
Dept: ULTRASOUND IMAGING | Facility: HOSPITAL | Age: 32
Discharge: HOME | End: 2022-11-02

## 2022-11-02 DIAGNOSIS — K80.20 GALLSTONES: ICD-10-CM

## 2022-11-03 ENCOUNTER — OFFICE VISIT (OUTPATIENT)
Dept: PHYSICAL THERAPY | Age: 32
End: 2022-11-03

## 2022-11-03 DIAGNOSIS — R51.9 LEFT-SIDED FACE PAIN: ICD-10-CM

## 2022-11-03 DIAGNOSIS — M79.18 MUSCULOSKELETAL PAIN: Primary | ICD-10-CM
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
## 2022-11-07 ENCOUNTER — APPOINTMENT (OUTPATIENT)
Dept: PHYSICAL THERAPY | Age: 32
End: 2022-11-07

## 2022-11-09 ENCOUNTER — OFFICE VISIT (OUTPATIENT)
Dept: PHYSICAL THERAPY | Age: 32
End: 2022-11-09

## 2022-11-09 DIAGNOSIS — M79.18 MUSCULOSKELETAL PAIN: Primary | ICD-10-CM

## 2022-11-09 DIAGNOSIS — R51.9 LEFT-SIDED FACE PAIN: ICD-10-CM
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
## 2022-11-11 ENCOUNTER — OFFICE VISIT (OUTPATIENT)
Dept: PHYSICAL THERAPY | Age: 32
End: 2022-11-11

## 2022-11-11 DIAGNOSIS — M79.18 MUSCULOSKELETAL PAIN: Primary | ICD-10-CM

## 2022-11-11 DIAGNOSIS — R51.9 LEFT-SIDED FACE PAIN: ICD-10-CM
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
## 2022-11-14 ENCOUNTER — OFFICE VISIT (OUTPATIENT)
Dept: PHYSICAL THERAPY | Age: 32
End: 2022-11-14

## 2022-11-14 DIAGNOSIS — R51.9 LEFT-SIDED FACE PAIN: ICD-10-CM

## 2022-11-14 DIAGNOSIS — M79.18 MUSCULOSKELETAL PAIN: Primary | ICD-10-CM
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
## 2022-11-17 ENCOUNTER — EVALUATION (OUTPATIENT)
Dept: PHYSICAL THERAPY | Age: 32
End: 2022-11-17

## 2022-11-17 DIAGNOSIS — M79.18 MUSCULOSKELETAL PAIN: Primary | ICD-10-CM

## 2022-11-17 DIAGNOSIS — R51.9 LEFT-SIDED FACE PAIN: ICD-10-CM
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
## 2022-11-19 ENCOUNTER — APPOINTMENT (OUTPATIENT)
Dept: LAB | Facility: HOSPITAL | Age: 32
End: 2022-11-19

## 2022-11-19 DIAGNOSIS — R53.82 CHRONIC FATIGUE: ICD-10-CM

## 2022-11-19 LAB
ALBUMIN SERPL BCP-MCNC: 4.4 G/DL (ref 3.5–5)
ALP SERPL-CCNC: 66 U/L (ref 34–104)
ALT SERPL W P-5'-P-CCNC: 13 U/L (ref 7–52)
ANION GAP SERPL CALCULATED.3IONS-SCNC: 7 MMOL/L (ref 4–13)
AST SERPL W P-5'-P-CCNC: 12 U/L (ref 13–39)
BASOPHILS # BLD AUTO: 0.03 THOUSANDS/ÂΜL (ref 0–0.1)
BASOPHILS NFR BLD AUTO: 0 % (ref 0–1)
BILIRUB SERPL-MCNC: 0.8 MG/DL (ref 0.2–1)
BUN SERPL-MCNC: 8 MG/DL (ref 5–25)
CALCIUM SERPL-MCNC: 9.2 MG/DL (ref 8.4–10.2)
CHLORIDE SERPL-SCNC: 104 MMOL/L (ref 96–108)
CO2 SERPL-SCNC: 27 MMOL/L (ref 21–32)
CREAT SERPL-MCNC: 0.75 MG/DL (ref 0.6–1.3)
EOSINOPHIL # BLD AUTO: 0.06 THOUSAND/ÂΜL (ref 0–0.61)
EOSINOPHIL NFR BLD AUTO: 1 % (ref 0–6)
ERYTHROCYTE [DISTWIDTH] IN BLOOD BY AUTOMATED COUNT: 13.5 % (ref 11.6–15.1)
GFR SERPL CREATININE-BSD FRML MDRD: 105 ML/MIN/1.73SQ M
GLUCOSE P FAST SERPL-MCNC: 102 MG/DL (ref 65–99)
HCT VFR BLD AUTO: 40.9 % (ref 34.8–46.1)
HGB BLD-MCNC: 13.1 G/DL (ref 11.5–15.4)
IMM GRANULOCYTES # BLD AUTO: 0.02 THOUSAND/UL (ref 0–0.2)
IMM GRANULOCYTES NFR BLD AUTO: 0 % (ref 0–2)
LYMPHOCYTES # BLD AUTO: 2.69 THOUSANDS/ÂΜL (ref 0.6–4.47)
LYMPHOCYTES NFR BLD AUTO: 31 % (ref 14–44)
MCH RBC QN AUTO: 28.8 PG (ref 26.8–34.3)
MCHC RBC AUTO-ENTMCNC: 32 G/DL (ref 31.4–37.4)
MCV RBC AUTO: 90 FL (ref 82–98)
MONOCYTES # BLD AUTO: 0.39 THOUSAND/ÂΜL (ref 0.17–1.22)
MONOCYTES NFR BLD AUTO: 5 % (ref 4–12)
NEUTROPHILS # BLD AUTO: 5.57 THOUSANDS/ÂΜL (ref 1.85–7.62)
NEUTS SEG NFR BLD AUTO: 63 % (ref 43–75)
NRBC BLD AUTO-RTO: 0 /100 WBCS
PLATELET # BLD AUTO: 414 THOUSANDS/UL (ref 149–390)
PMV BLD AUTO: 9 FL (ref 8.9–12.7)
POTASSIUM SERPL-SCNC: 3.8 MMOL/L (ref 3.5–5.3)
PROT SERPL-MCNC: 7.4 G/DL (ref 6.4–8.4)
RBC # BLD AUTO: 4.55 MILLION/UL (ref 3.81–5.12)
SODIUM SERPL-SCNC: 138 MMOL/L (ref 135–147)
TSH SERPL DL<=0.05 MIU/L-ACNC: 1.35 UIU/ML (ref 0.45–4.5)
WBC # BLD AUTO: 8.76 THOUSAND/UL (ref 4.31–10.16)

## 2022-11-21 ENCOUNTER — OFFICE VISIT (OUTPATIENT)
Dept: PHYSICAL THERAPY | Age: 32
End: 2022-11-21

## 2022-11-21 DIAGNOSIS — R51.9 LEFT-SIDED FACE PAIN: ICD-10-CM

## 2022-11-21 DIAGNOSIS — M79.18 MUSCULOSKELETAL PAIN: Primary | ICD-10-CM
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
## 2022-11-25 ENCOUNTER — OFFICE VISIT (OUTPATIENT)
Dept: PHYSICAL THERAPY | Age: 32
End: 2022-11-25

## 2022-11-25 DIAGNOSIS — M79.18 MUSCULOSKELETAL PAIN: Primary | ICD-10-CM

## 2022-11-25 DIAGNOSIS — R51.9 LEFT-SIDED FACE PAIN: ICD-10-CM
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
## 2022-11-30 ENCOUNTER — OFFICE VISIT (OUTPATIENT)
Dept: PHYSICAL THERAPY | Age: 32
End: 2022-11-30

## 2022-11-30 DIAGNOSIS — R51.9 LEFT-SIDED FACE PAIN: ICD-10-CM

## 2022-11-30 DIAGNOSIS — M79.18 MUSCULOSKELETAL PAIN: Primary | ICD-10-CM
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
## 2022-12-02 ENCOUNTER — OFFICE VISIT (OUTPATIENT)
Dept: PHYSICAL THERAPY | Age: 32
End: 2022-12-02

## 2022-12-02 DIAGNOSIS — M79.18 MUSCULOSKELETAL PAIN: Primary | ICD-10-CM

## 2022-12-02 DIAGNOSIS — R51.9 LEFT-SIDED FACE PAIN: ICD-10-CM
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
## 2022-12-05 ENCOUNTER — OFFICE VISIT (OUTPATIENT)
Dept: PHYSICAL THERAPY | Age: 32
End: 2022-12-05

## 2022-12-05 DIAGNOSIS — M79.18 MUSCULOSKELETAL PAIN: Primary | ICD-10-CM

## 2022-12-05 DIAGNOSIS — R51.9 LEFT-SIDED FACE PAIN: ICD-10-CM
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
## 2022-12-08 ENCOUNTER — OFFICE VISIT (OUTPATIENT)
Dept: PHYSICAL THERAPY | Age: 32
End: 2022-12-08

## 2022-12-08 DIAGNOSIS — R51.9 LEFT-SIDED FACE PAIN: ICD-10-CM

## 2022-12-08 DIAGNOSIS — M79.18 MUSCULOSKELETAL PAIN: Primary | ICD-10-CM
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
## 2022-12-12 ENCOUNTER — OFFICE VISIT (OUTPATIENT)
Dept: PHYSICAL THERAPY | Age: 32
End: 2022-12-12

## 2022-12-12 DIAGNOSIS — R51.9 LEFT-SIDED FACE PAIN: ICD-10-CM

## 2022-12-12 DIAGNOSIS — M79.18 MUSCULOSKELETAL PAIN: Primary | ICD-10-CM
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
## 2022-12-14 ENCOUNTER — OFFICE VISIT (OUTPATIENT)
Dept: PHYSICAL THERAPY | Age: 32
End: 2022-12-14

## 2022-12-14 DIAGNOSIS — M79.18 MUSCULOSKELETAL PAIN: Primary | ICD-10-CM

## 2022-12-14 DIAGNOSIS — R51.9 LEFT-SIDED FACE PAIN: ICD-10-CM
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
## 2022-12-15 ENCOUNTER — APPOINTMENT (OUTPATIENT)
Dept: PHYSICAL THERAPY | Age: 32
End: 2022-12-15

## 2022-12-16 ENCOUNTER — OFFICE VISIT (OUTPATIENT)
Dept: FAMILY MEDICINE CLINIC | Facility: CLINIC | Age: 32
End: 2022-12-16

## 2022-12-16 DIAGNOSIS — Z20.822 COVID-19 RULED OUT BY LABORATORY TESTING: Primary | ICD-10-CM

## 2022-12-16 DIAGNOSIS — U07.1 COVID-19 VIRUS INFECTION: ICD-10-CM

## 2022-12-16 DIAGNOSIS — Z20.822 COVID-19 RULED OUT BY LABORATORY TESTING: ICD-10-CM

## 2022-12-16 LAB
SARS-COV-2 AG UPPER RESP QL IA: POSITIVE
VALID CONTROL: ABNORMAL

## 2022-12-16 RX ORDER — NIRMATRELVIR AND RITONAVIR 300-100 MG
3 KIT ORAL 2 TIMES DAILY
Qty: 30 TABLET | Refills: 0 | Status: SHIPPED | OUTPATIENT
Start: 2022-12-16 | End: 2022-12-21

## 2022-12-16 RX ORDER — NIRMATRELVIR AND RITONAVIR 300-100 MG
3 KIT ORAL 2 TIMES DAILY
Qty: 30 TABLET | Refills: 0 | Status: SHIPPED | OUTPATIENT
Start: 2022-12-16 | End: 2022-12-16 | Stop reason: SDUPTHER

## 2022-12-16 NOTE — PROGRESS NOTES
Assessment/Plan:       Problem List Items Addressed This Visit        Other    COVID-19 virus infection     3 days worsening sx sore throat and congestion-tested pos at home  Today came in for evaluation tested positive for COVID I will recommend that she remain out of work 10 days  Agrees to [employer]        Other Visit Diagnoses     COVID-19 ruled out by laboratory testing    -  Primary    Relevant Orders    POCT Rapid Covid Ag (Completed)            Subjective:      Patient ID: Nayely Briggs is a 33 y o  female  HPI    The following portions of the patient's history were reviewed and updated as appropriate: allergies, current medications, past family history, past medical history, past social history, past surgical history and problem list     Review of Systems   Constitutional: Negative for chills, fatigue and fever  HENT: Negative for congestion, nosebleeds, rhinorrhea, sinus pressure and sore throat  Eyes: Negative for discharge and redness  Respiratory: Negative for cough and shortness of breath  Cardiovascular: Negative for chest pain, palpitations and leg swelling  Gastrointestinal: Negative for abdominal pain, blood in stool and nausea  Endocrine: Negative for cold intolerance, heat intolerance and polyuria  Genitourinary: Negative for dysuria and frequency  Musculoskeletal: Negative for arthralgias, back pain and myalgias  Skin: Negative for rash  Neurological: Negative for dizziness, weakness and headaches  Hematological: Negative for adenopathy  Psychiatric/Behavioral: Negative for behavioral problems and sleep disturbance  The patient is not nervous/anxious  Objective: There were no vitals taken for this visit  Physical Exam  Vitals and nursing note reviewed  Constitutional:       General: She is not in acute distress  Appearance: Normal appearance  She is well-developed  HENT:      Head: Normocephalic and atraumatic        Right Ear: Tympanic membrane and external ear normal       Left Ear: Tympanic membrane and external ear normal       Nose: Congestion present  Mouth/Throat:      Mouth: Mucous membranes are moist       Pharynx: Posterior oropharyngeal erythema present  No oropharyngeal exudate  Eyes:      General: No scleral icterus  Right eye: No discharge  Left eye: No discharge  Conjunctiva/sclera: Conjunctivae normal       Pupils: Pupils are equal, round, and reactive to light  Neck:      Thyroid: No thyromegaly  Vascular: No JVD  Cardiovascular:      Rate and Rhythm: Normal rate and regular rhythm  Pulses: Normal pulses  Heart sounds: Normal heart sounds  No murmur heard  Pulmonary:      Effort: Pulmonary effort is normal       Breath sounds: No wheezing or rales  Chest:      Chest wall: No tenderness  Abdominal:      General: Bowel sounds are normal  There is no distension  Palpations: Abdomen is soft  There is no mass  Tenderness: There is no abdominal tenderness  Musculoskeletal:         General: No tenderness or deformity  Normal range of motion  Cervical back: Normal range of motion  Lymphadenopathy:      Cervical: No cervical adenopathy  Skin:     General: Skin is warm and dry  Findings: No rash  Neurological:      General: No focal deficit present  Mental Status: She is alert and oriented to person, place, and time  Cranial Nerves: No cranial nerve deficit  Coordination: Coordination normal       Deep Tendon Reflexes: Reflexes are normal and symmetric  Reflexes normal    Psychiatric:         Mood and Affect: Mood normal          Behavior: Behavior normal          Thought Content: Thought content normal          Judgment: Judgment normal           Data:    Laboratory Results: I have personally reviewed the pertinent laboratory results/reports   Radiology/Other Diagnostic Testing Results: I have personally reviewed pertinent reports         Lab Results   Component Value Date    WBC 8 76 11/19/2022    HGB 13 1 11/19/2022    HCT 40 9 11/19/2022    MCV 90 11/19/2022     (H) 11/19/2022     Lab Results   Component Value Date    K 3 8 11/19/2022     11/19/2022    CO2 27 11/19/2022    BUN 8 11/19/2022    CREATININE 0 75 11/19/2022    GLUF 102 (H) 11/19/2022    CALCIUM 9 2 11/19/2022    AST 12 (L) 11/19/2022    ALT 13 11/19/2022    ALKPHOS 66 11/19/2022    EGFR 105 11/19/2022     Lab Results   Component Value Date    CHOLESTEROL 145 05/16/2022     Lab Results   Component Value Date    HDL 75 05/16/2022     Lab Results   Component Value Date    LDLCALC 37 05/16/2022     Lab Results   Component Value Date    TRIG 166 (H) 05/16/2022     No results found for: [place], [place]  Lab Results   Component Value Date    VUD8LKMQDYMG 1 346 11/19/2022     Lab Results   Component Value Date    HGBA1C 5 3 08/25/2022     No results found for: NICOLE Toro DO

## 2022-12-16 NOTE — ASSESSMENT & PLAN NOTE
3 days worsening sx sore throat and congestion-tested pos at home  Today came in for evaluation tested positive for COVID I will recommend that she remain out of work 10 days    Agrees to [facility] [place]

## 2022-12-19 ENCOUNTER — APPOINTMENT (OUTPATIENT)
Dept: PHYSICAL THERAPY | Age: 32
End: 2022-12-19

## 2022-12-22 ENCOUNTER — OFFICE VISIT (OUTPATIENT)
Dept: PHYSICAL THERAPY | Age: 32
End: 2022-12-22

## 2022-12-22 DIAGNOSIS — R51.9 LEFT-SIDED FACE PAIN: ICD-10-CM

## 2022-12-22 DIAGNOSIS — M79.18 MUSCULOSKELETAL PAIN: Primary | ICD-10-CM
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
## 2022-12-29 ENCOUNTER — OFFICE VISIT (OUTPATIENT)
Dept: PHYSICAL THERAPY | Age: 32
End: 2022-12-29

## 2022-12-29 DIAGNOSIS — R51.9 LEFT-SIDED FACE PAIN: ICD-10-CM

## 2022-12-29 DIAGNOSIS — M79.18 MUSCULOSKELETAL PAIN: Primary | ICD-10-CM
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
## 2023-01-04 ENCOUNTER — OFFICE VISIT (OUTPATIENT)
Dept: PHYSICAL THERAPY | Age: 33
End: 2023-01-04

## 2023-01-04 DIAGNOSIS — M79.18 MUSCULOSKELETAL PAIN: Primary | ICD-10-CM

## 2023-01-04 DIAGNOSIS — R51.9 LEFT-SIDED FACE PAIN: ICD-10-CM
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
## 2023-01-05 ENCOUNTER — APPOINTMENT (OUTPATIENT)
Dept: PHYSICAL THERAPY | Age: 33
End: 2023-01-05

## 2023-01-09 ENCOUNTER — OFFICE VISIT (OUTPATIENT)
Dept: PHYSICAL THERAPY | Age: 33
End: 2023-01-09

## 2023-01-09 DIAGNOSIS — R51.9 LEFT-SIDED FACE PAIN: ICD-10-CM

## 2023-01-09 DIAGNOSIS — M79.18 MUSCULOSKELETAL PAIN: Primary | ICD-10-CM
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
## 2023-01-12 ENCOUNTER — EVALUATION (OUTPATIENT)
Dept: PHYSICAL THERAPY | Age: 33
End: 2023-01-12

## 2023-01-12 DIAGNOSIS — M79.18 MUSCULOSKELETAL PAIN: Primary | ICD-10-CM

## 2023-01-12 DIAGNOSIS — R51.9 LEFT-SIDED FACE PAIN: ICD-10-CM
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
## 2023-01-16 ENCOUNTER — OFFICE VISIT (OUTPATIENT)
Dept: PHYSICAL THERAPY | Age: 33
End: 2023-01-16

## 2023-01-16 DIAGNOSIS — R51.9 LEFT-SIDED FACE PAIN: ICD-10-CM

## 2023-01-16 DIAGNOSIS — M79.18 MUSCULOSKELETAL PAIN: Primary | ICD-10-CM
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
## 2023-01-19 ENCOUNTER — APPOINTMENT (OUTPATIENT)
Dept: PHYSICAL THERAPY | Age: 33
End: 2023-01-19

## 2023-01-20 ENCOUNTER — OFFICE VISIT (OUTPATIENT)
Dept: PHYSICAL THERAPY | Age: 33
End: 2023-01-20

## 2023-01-20 DIAGNOSIS — M79.18 MUSCULOSKELETAL PAIN: Primary | ICD-10-CM

## 2023-01-20 DIAGNOSIS — R51.9 LEFT-SIDED FACE PAIN: ICD-10-CM
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
## 2023-01-30 ENCOUNTER — OFFICE VISIT (OUTPATIENT)
Dept: PHYSICAL THERAPY | Age: 33
End: 2023-01-30

## 2023-01-30 DIAGNOSIS — M79.18 MUSCULOSKELETAL PAIN: Primary | ICD-10-CM

## 2023-01-30 DIAGNOSIS — R51.9 LEFT-SIDED FACE PAIN: ICD-10-CM
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
## 2023-02-02 ENCOUNTER — OFFICE VISIT (OUTPATIENT)
Dept: PHYSICAL THERAPY | Age: 33
End: 2023-02-02

## 2023-02-02 DIAGNOSIS — M79.18 MUSCULOSKELETAL PAIN: Primary | ICD-10-CM

## 2023-02-02 DIAGNOSIS — R51.9 LEFT-SIDED FACE PAIN: ICD-10-CM
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
## 2023-02-09 ENCOUNTER — EVALUATION (OUTPATIENT)
Dept: PHYSICAL THERAPY | Facility: MEDICAL CENTER | Age: 33
End: 2023-02-09

## 2023-02-09 DIAGNOSIS — G89.29 CHRONIC NECK PAIN: ICD-10-CM

## 2023-02-09 DIAGNOSIS — G89.29 CHRONIC LEFT SHOULDER PAIN: ICD-10-CM

## 2023-02-09 DIAGNOSIS — M25.512 CHRONIC LEFT SHOULDER PAIN: ICD-10-CM

## 2023-02-09 DIAGNOSIS — M54.2 CHRONIC NECK PAIN: ICD-10-CM

## 2023-02-09 DIAGNOSIS — G51.8 CRANIOFACIAL PAIN SYNDROME: Primary | ICD-10-CM

## 2023-02-09 DIAGNOSIS — R51.9 CRANIOFACIAL PAIN: ICD-10-CM

## 2023-02-10 NOTE — PROGRESS NOTES
PT Evaluation     Today's date: 2023  Patient name: Tray Carr  : 1990  MRN: [id number]  Referring provider: Pedro Mccartney MD  Dx:   Encounter Diagnosis     ICD-10-CM    1  Craniofacial pain syndrome  G51 8       2  Craniofacial pain  R51 9       3  Chronic neck pain  M54 2     G89 29       4  Chronic left shoulder pain  M25 512     G89 29                      Assessment  Assessment details: Problem List:  1) poor stress management --> UE hypertonicity - addressing with neuromotor retraining   2) poor postural control - addressing with neuromotor retraining   3) C1-2 hypomobility (L) - addressing with mobs and mobility exercises   4) C5-T5 hypomobility - addressing with mobs and mobility exercises   5) L posterior shoulder tightness - addressing with mobs and mobility exercises   6) poor L TMJ movement coordination (w/ ADDwR) - addressing with neuromotor retraining   7) poor cervicothoracic movement coordination - addressing with neuromotor retraining     Tray Carr is a pleasant 27 y o  female who presents with chronic neck pain with associated L craniofacial and shoulder pain that began almost a year ago insidiously  The primary etiology is a combination of poor stress management strategies and poor postural control resulting is significant movement system problems as listed above and resulting in worry over not knowing what's wrong and fear of not being able to keep active  No further referral appears necessary at this time based upon examination results  I expect she will progress slowly and be most limited by her ability to improve her stress management strategies  However, I do expect her to improve with the outlined plan below  Positive prognostic indicators include positive attitude toward recovery and good understanding of diagnosis and treatment plan options    Negative prognostic indicators include chronicity of symptoms, anxiety, multiple concurrent orthopedic problems and obesity  Comparable signs:  1) chewing  2) turning her head to the side    Goals  Patient will be independent with home exercise program    Patient will be able to manage symptoms independently  Plan  Duration in visits: 16  Duration in weeks: 20        Subjective Evaluation    History of Present Illness  Mechanism of injury: Insidious onset 1 year ago, originally was pain in face and neck and ear  She trialed many antiinflammatories and antibiotics with no benefit  She also had her wisdom teeth extracted in August with no improvement in symptoms  She then sought chiropractic and physical therapy care in October which has been helping, but she still has considerable pain that she is unable to predict or control  Her primary physical therapist requested a consult with me for additional thoughts and also due to appointment time constraints  Quality of life: good    Pain  At worst pain ratin    Patient Goals  Patient goals for therapy: decreased pain, increased motion, independence with ADLs/IADLs and return to sport/leisure activities          Objective     Static Posture     Head  Forward  Shoulders  Rounded  Postural Observations  Seated posture: poor  Standing posture: fair  Correction of posture: makes symptoms better        Palpation   Left   Hypertonic in the scalenes, sternocleidomastoid, suboccipitals, upper trapezius, masseter, temporalis, lateral pterygoid and medial pterygoid  Tenderness of the scalenes, sternocleidomastoid, suboccipitals, upper trapezius, masseter, temporalis, lateral pterygoid and medial pterygoid  Right   Hypertonic in the scalenes, sternocleidomastoid, suboccipitals, upper trapezius, masseter, temporalis, lateral pterygoid and medial pterygoid  Tenderness of the scalenes, sternocleidomastoid, suboccipitals, upper trapezius, masseter, temporalis, lateral pterygoid and medial pterygoid       Neurological Testing     Sensation   Cervical/Thoracic   Left Intact: light touch    Right   Intact: light touch    Reflexes   Left   Biceps (C5/C6): normal (2+)  Guadalupe's reflex: negative    Right   Biceps (C5/C6): normal (2+)  Guadalupe's reflex: negative    Active Range of Motion   Cervical/Thoracic Spine       Cervical    Flexion:  WFL  Extension:  Restriction level: minimal  Left lateral flexion:  Restriction level: moderate  Right lateral flexion:  Restriction level minimal  Left rotation:  Restriction level: moderate  Right rotation:  Restriction level: minimal  Left Shoulder   External rotation BTH: WFL  Internal rotation BTB: mid thoracic   Horizontal abduction: 10 degrees     Right Shoulder   External rotation BTH: WFL  Internal rotation BTB: upper thoracic   Horizontal abduction: 40 degrees     Left Elbow   Normal active range of motion    Right Elbow   Normal active range of motion    Left Wrist   Normal active range of motion    Right Wrist   Normal active range of motion    Joint Play   Joints within functional limits: C2, C3 and C4     Hypomobile: C1, C5, C6, C7, T1, T2, T3, T4 and T5     Pain: C4   Mechanical Assessment    Cervical    Seated retraction: repeated movements   Pain location: no change    Thoracic      Lumbar      Strength/Myotome Testing   Cervical Spine     Left   Normal strength    Right   Normal strength    Tests   Cervical   Positive vertical compression, cervical distraction test and craniocervical flexion test   Negative alar ligament test, transverse ligament test and VBI  Left   Positive Spurling's Test A and cervical flexion-rotation test       Right   Negative Spurling's Test A and cervical flexion-rotation test      Left Shoulder   Negative ULTT1, ULTT3 and ULTT4  Right Shoulder   Negative ULTT1, ULTT3 and ULTT4  Lumbar   Positive vertical compression        Ambulation   Weight-Bearing Status   Weight-Bearing Status (Left): full weight bearing   Weight-Bearing Status (Right): full weight-bearing      Observational Gait Gait: within functional limits   TMJ   Jaw observations: facial symmetry within normal limits  Occlusion class: class I (normal)  Patient does not have a  cleft palate  Scalloping of tongue: no  Cusp wear: no  Jaw trauma: no  Mursicatio buccarum: yes  Patient uses the following corrective devices: full contact maxillary splint  ROM: pain with movement  Opening (mm): 41   Lateral excursion, left (mm): 7  Lateral excursion, right (mm)t: 9   ROM comments: Poor lingual resting position  Poor lingual dissociation             Precautions: none    Date: 2/9      Manual       SOR/MFR SK      C1-2 SNAG       UPA SK                    Neuromuscular Re-education       Diaphragmatic breathing SK      Progressive relaxation SK      scap W 10                    TMJ relaxed position SK      TMJ controlled opening SK      Lingual elevation SK                    Therapeutic Exercise                                   Therapeutic Activities                     Gait Training                     Modalities

## 2023-02-16 ENCOUNTER — OFFICE VISIT (OUTPATIENT)
Dept: PHYSICAL THERAPY | Facility: MEDICAL CENTER | Age: 33
End: 2023-02-16

## 2023-02-16 DIAGNOSIS — G89.29 CHRONIC LEFT SHOULDER PAIN: ICD-10-CM

## 2023-02-16 DIAGNOSIS — M25.512 CHRONIC LEFT SHOULDER PAIN: ICD-10-CM

## 2023-02-16 DIAGNOSIS — G89.29 CHRONIC NECK PAIN: ICD-10-CM

## 2023-02-16 DIAGNOSIS — R51.9 CRANIOFACIAL PAIN: ICD-10-CM

## 2023-02-16 DIAGNOSIS — M54.2 CHRONIC NECK PAIN: ICD-10-CM

## 2023-02-16 DIAGNOSIS — G51.8 CRANIOFACIAL PAIN SYNDROME: Primary | ICD-10-CM

## 2023-02-16 NOTE — PROGRESS NOTES
Daily Note     Today's date: 2023  Patient name: Timothy Guzman  : 1990  MRN: [id number]  Referring provider: Surinder Masterson MD  Dx:   Encounter Diagnosis     ICD-10-CM    1  Craniofacial pain syndrome  G51 8       2  Craniofacial pain  R51 9       3  Chronic neck pain  M54 2     G89 29       4  Chronic left shoulder pain  M25 512     G89 29                      Assessment:   Problem List:  1) poor stress management --> UE hypertonicity - addressing with neuromotor retraining   2) poor postural control - addressing with neuromotor retraining   3) C1-2 hypomobility (L) - addressing with mobs and mobility exercises   4) C5-T5 hypomobility - addressing with mobs and mobility exercises   5) L posterior shoulder tightness - addressing with mobs and mobility exercises   6) poor L TMJ movement coordination (w/ ADDwR) - addressing with neuromotor retraining   7) poor cervicothoracic movement coordination - addressing with neuromotor retraining      Comparable signs:  1) chewing  2) turning her head to the side     Goals  Patient will be independent with home exercise program    Patient will be able to manage symptoms independently  Patient will be able to chew without limitation due to pain  Patient will be able to turn to look over a shoulder to back out of a parking space without limitation due to pain  Plan: Continue per plan of care  Subjective: Patient reports she has had increased pain since Tuesday, which she attributes to a highly stressful weekend at a wrestling tournament for her son          Objective: See treatment diary below      Precautions: none    Date:       Manual       SOR/MFR SK      C1-2 SNAG       UPA SK                    Neuromuscular Re-education       Diaphragmatic breathing SK      Progressive relaxation SK      scap W 10                    TMJ relaxed position SK      TMJ controlled opening SK      Lingual elevation SK                    Therapeutic Exercise Therapeutic Activities                     Gait Training                     Modalities        10'

## 2023-02-20 ENCOUNTER — OFFICE VISIT (OUTPATIENT)
Dept: PHYSICAL THERAPY | Age: 33
End: 2023-02-20

## 2023-02-20 DIAGNOSIS — G51.8 CRANIOFACIAL PAIN SYNDROME: Primary | ICD-10-CM

## 2023-02-20 DIAGNOSIS — R51.9 LEFT-SIDED FACE PAIN: ICD-10-CM

## 2023-02-20 DIAGNOSIS — M25.512 CHRONIC LEFT SHOULDER PAIN: ICD-10-CM

## 2023-02-20 DIAGNOSIS — M79.18 MUSCULOSKELETAL PAIN: ICD-10-CM

## 2023-02-20 DIAGNOSIS — G89.29 CHRONIC NECK PAIN: ICD-10-CM

## 2023-02-20 DIAGNOSIS — M54.2 CHRONIC NECK PAIN: ICD-10-CM

## 2023-02-20 DIAGNOSIS — G89.29 CHRONIC LEFT SHOULDER PAIN: ICD-10-CM

## 2023-02-20 DIAGNOSIS — R51.9 CRANIOFACIAL PAIN: ICD-10-CM

## 2023-02-20 NOTE — PROGRESS NOTES
Daily Note     Today's date: 2023  Patient name: Matthew Gibson  : 1990  MRN: [id number]  Referring provider: Ryley Cardoso MD  Dx:   Encounter Diagnosis     ICD-10-CM    1  Craniofacial pain syndrome  G51 8       2  Craniofacial pain  R51 9       3  Chronic neck pain  M54 2     G89 29       4  Chronic left shoulder pain  M25 512     G89 29       5  Musculoskeletal pain  M79 18       6  Left-sided face pain  R51 9           Start Time: 1615  Stop Time: 1700  Total time in clinic (min): 45 minutes    Subjective: Patient reports she had a terrible week last week and today is the best she has been since  She states both sides of face were tight, spasms, and pain across her left face  Neck not too bad  She did have shots but not as effective as her last round  Objective: See treatment diary below      Assessment: Tolerated treatment well  Patient would benefit from continued PT  Progress to discharge next week  Patient is seeing PT at another facility for TMJ and face pain  Advised massages for her soft tissue tightness between PT visits and she will continue with her HEP for exercises  Significant less tightness in left SCM and scalenes and she felt much better after session  Plan: Continue per plan of care        Precautions: MPS, concussion and mild TBI 4 years ago,     Manuals     neck 10 10 15 15' 15 SG  21' 21' LH   CST/MFR      SG  10' 10' LH   L TMJ/ear pulls  5 5 5' 5 SG  5' 5' LH   Seated and prone lower c-spine/upper t-spine 5 5      10' 10' [facility]   Neuro Re-Ed             KT tape L SCM and trap                                                                 Ther Ex             snag 1' 2'           Posture overcorrect 10'   Review/ t/o         HEP 5'        5'    Chin tucks supine             Half roller supine stretch home            No monies home            TB rows Blue 30x 30x 30x blk 30x blk 30x 30x       TB shld extension Blue 30x 30x 30x blk 30x blk 30x 30x       UBE 4/3 3/3 3/3 3/3' 3/3 3/3                    scap retraction stand foam roller 30x 30x 30x 30x 30x 30x       Open book     nv 10x ea 10x ea       Thoracic rotations & ext    NV         DNF    NV         Ther Activity                                       Gait Training                                       Modalities CCU

## 2023-02-21 ENCOUNTER — OFFICE VISIT (OUTPATIENT)
Dept: FAMILY MEDICINE CLINIC | Facility: CLINIC | Age: 33
End: 2023-02-21

## 2023-02-21 DIAGNOSIS — J06.9 ACUTE URI: ICD-10-CM

## 2023-02-21 DIAGNOSIS — H10.32 ACUTE BACTERIAL CONJUNCTIVITIS OF LEFT EYE: Primary | ICD-10-CM

## 2023-02-21 RX ORDER — AZITHROMYCIN 250 MG/1
TABLET, FILM COATED ORAL
Qty: 6 TABLET | Refills: 0 | Status: SHIPPED | OUTPATIENT
Start: 2023-02-21 | End: 2023-02-25

## 2023-02-21 NOTE — PROGRESS NOTES
OFFICE VISIT  Louise Mendoza 27 y o  female MRN: [id number]          Assessment / Plan:  Problem List Items Addressed This Visit        Respiratory    Acute URI     You have been prescribed an antibiotic  This medication is used to treat bacterial infections  Follow the directions as prescribed  Do not share this medication with anyone  Do not stop taking her medication until it is finished, even if you are feeling better  Taking medication with a full glass of water  Call the office if you experience any possible side effects  Wash hands frequently to prevent the spread of infection  Ibuprofen and/or tylenol as needed for pain or fever  If not improving over the next 7-10 days, call office  Relevant Medications    azithromycin (ZITHROMAX) 250 mg tablet       Other    Acute bacterial conjunctivitis of left eye - Primary     Does have polymyxin drops at home, will start today good hand hygiene         Relevant Medications    azithromycin (ZITHROMAX) 250 mg tablet         Reason For Visit / Chief Complaint  No chief complaint on file  HPI:  Louise Mendoza is a 27 y o  female who presents today for acute sick visit  She reports waking with left eye irritation, burning, redness  Child recently treated for bacterial conjunctivitis  She also reports for the last week and a half having cough congestion sore throat not improving with over-the-counter medication      Historical Information   Past Medical History:   Diagnosis Date   • Allergic rhinitis    • Brachial plexopathy     radiculopathy   • Concussion 2018    mild memory deficit   • Dizziness and giddiness    • 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     Past Surgical History:   Procedure Laterality Date   •  SECTION      x2     Social History   Social History     Substance and Sexual Activity   Alcohol Use Yes    Comment: social     Social History     Substance and Sexual Activity   Drug Use No     Social History Tobacco Use   Smoking Status Never   Smokeless Tobacco Never     Family History   Problem Relation Age of Onset   • Diabetes type II Mother    • Deafness Father    • Depression Father    • Asthma Son        Meds/Allergies   Allergies   Allergen Reactions   • Fioricet [Butalbital-Apap-Caffeine] Itching   • Fioricet [Butalbital-Apap-Caffeine]        Meds:    Current Outpatient Medications:   •  azithromycin (ZITHROMAX) 250 mg tablet, Take 2 tablets today then 1 tablet daily x 4 days, Disp: 6 tablet, Rfl: 0  •  carbamazepine (CARBATROL) 100 MG 12 hr capsule, Take 1 capsule (100 mg total) by mouth 2 (two) times a day After 3 days, take 2 tablets twice per day (Patient not taking: No sig reported), Disp: 40 capsule, Rfl: 0  •  Cetirizine HCl (ZyrTEC Allergy) 10 MG CAPS, , Disp: , Rfl:   •  diazepam (VALIUM) 5 mg tablet, Take 1 tablet (5 mg total) by mouth every 8 (eight) hours as needed for anxiety (Patient not taking: Reported on 10/31/2022), Disp: 30 tablet, Rfl: 0  •  ergocalciferol (VITAMIN D2) 50,000 units, Take 1 capsule (50,000 Units total) by mouth once a week (Patient not taking: Reported on 10/31/2022), Disp: 12 capsule, Rfl: 1  •  ibuprofen (MOTRIN) 800 mg tablet, Take 1 tablet by mouth 3 (three) times a day as needed, Disp: , Rfl:   •  LORazepam (ATIVAN) 0 5 mg tablet, Take 1 tablet (0 5 mg total) by mouth every 8 (eight) hours as needed for anxiety, Disp: 60 tablet, Rfl: 0  •  montelukast (SINGULAIR) 10 mg tablet, Take 1 tablet (10 mg total) by mouth daily at bedtime (Patient not taking: Reported on 10/31/2022), Disp: 30 tablet, Rfl: 1      REVIEW OF SYSTEMS  Review of Systems   Constitutional: Negative for activity change, chills, fatigue and fever  HENT: Positive for congestion, ear pain and sore throat  Negative for ear discharge, sinus pressure, sinus pain, tinnitus and trouble swallowing  Eyes: Negative for photophobia, pain, discharge, itching and visual disturbance     Respiratory: Positive for cough  Negative for chest tightness, shortness of breath and wheezing  Cardiovascular: Negative for chest pain and leg swelling  Gastrointestinal: Negative for abdominal distention, abdominal pain, constipation, diarrhea, nausea and vomiting  Endocrine: Negative for polydipsia, polyphagia and polyuria  Genitourinary: Negative for dysuria and frequency  Musculoskeletal: Negative for arthralgias, myalgias, neck pain and neck stiffness  Skin: Negative for color change  Neurological: Negative for dizziness, syncope, weakness, numbness and headaches  Hematological: Does not bruise/bleed easily  Psychiatric/Behavioral: Negative for behavioral problems, confusion, self-injury, sleep disturbance and suicidal ideas  The patient is not nervous/anxious  Current Vitals:      [unfilled]    PHYSICAL EXAMS:  Physical Exam  Vitals and nursing note reviewed  Constitutional:       Appearance: She is ill-appearing  HENT:      Head: Normocephalic and atraumatic  Right Ear: Tympanic membrane normal       Left Ear: Tympanic membrane normal       Nose: Congestion present  Mouth/Throat:      Pharynx: Posterior oropharyngeal erythema present  Eyes:      Extraocular Movements: Extraocular movements intact  Pupils: Pupils are equal, round, and reactive to light  Cardiovascular:      Rate and Rhythm: Normal rate and regular rhythm  Pulses: Normal pulses  Heart sounds: Normal heart sounds  Pulmonary:      Effort: Pulmonary effort is normal       Breath sounds: Normal breath sounds  Musculoskeletal:         General: Normal range of motion  Cervical back: Normal range of motion  Skin:     General: Skin is warm  Neurological:      General: No focal deficit present  Mental Status: She is alert and oriented to person, place, and time     Psychiatric:         Mood and Affect: Mood normal          Behavior: Behavior normal              Lab, imaging and other studies: I have personally reviewed pertinent reports  Lenora Artis

## 2023-02-21 NOTE — LETTER
February 21, 2023     Patient: Macarena Blair  YOB: 1990  Date of Visit: 2/21/2023      To Whom it May Concern:    Macarena Blair is under my professional care  [facility] was seen in my office on 2/21/2023  [facility] may return to work on 2/22  If you have any questions or concerns, please don't hesitate to call           Sincerely,          TAMMY Shane        CC: No Recipients

## 2023-02-22 ENCOUNTER — OFFICE VISIT (OUTPATIENT)
Dept: PHYSICAL THERAPY | Age: 33
End: 2023-02-22

## 2023-02-22 DIAGNOSIS — M79.18 MUSCULOSKELETAL PAIN: ICD-10-CM

## 2023-02-22 DIAGNOSIS — M54.2 CHRONIC NECK PAIN: ICD-10-CM

## 2023-02-22 DIAGNOSIS — G51.8 CRANIOFACIAL PAIN SYNDROME: Primary | ICD-10-CM

## 2023-02-22 DIAGNOSIS — R51.9 CRANIOFACIAL PAIN: ICD-10-CM

## 2023-02-22 DIAGNOSIS — G89.29 CHRONIC NECK PAIN: ICD-10-CM

## 2023-02-22 DIAGNOSIS — M25.512 CHRONIC LEFT SHOULDER PAIN: ICD-10-CM

## 2023-02-22 DIAGNOSIS — R51.9 LEFT-SIDED FACE PAIN: ICD-10-CM

## 2023-02-22 DIAGNOSIS — G89.29 CHRONIC LEFT SHOULDER PAIN: ICD-10-CM

## 2023-02-22 NOTE — PROGRESS NOTES
Daily Note     Today's date: 2023  Patient name: Steffen Duque  : 1990  MRN: [id number]  Referring provider: Saul Yancey MD  Dx:   Encounter Diagnosis     ICD-10-CM    1  Craniofacial pain syndrome  G51 8       2  Craniofacial pain  R51 9       3  Chronic neck pain  M54 2     G89 29       4  Chronic left shoulder pain  M25 512     G89 29       5  Musculoskeletal pain  M79 18       6  Left-sided face pain  R51 9           Start Time: 188  Stop Time: 0505  Total time in clinic (min): 45 minutes    Subjective: Notes dealing with a lot of stress today with family being sick  Noticed flare this afternoon after leaning in a weird position  Notes 4/10 left trap and scap and left side of face at time of treatment  Decreased post treatment  Objective: See treatment diary below      Assessment: Tolerated treatment well  Patient would benefit from continued PT Responds well to manual therapies  Encouraged diaphragmatic breathing for relaxation and to minimize tension holding  Fair postural awareness  Significant tightness with tenderness in left scapular and cervical soft tissue  Plan: Continue per plan of care        Precautions: MPS, concussion and mild TBI 4 years ago,     Manuals     neck 10 10 15 15' 15 SG PP  20' LH   CST/MFR      SG PP  10' LH   L TMJ/ear pulls  5 5 5' 5 SG PP  5' LH   Seated and prone lower c-spine/upper t-spine 5 5       10' LH   Neuro Re-Ed             KT tape L SCM and trap                                                                 Ther Ex             snag 1' 2'           Posture overcorrect 10'   Review/ t/o   10'      HEP 5'        5'    Chin tucks supine             Half roller supine stretch home            No monies home            TB rows Blue 30x 30x 30x blk 30x blk 30x 30x       TB shld extension Blue 30x 30x 30x blk 30x blk 30x 30x       UBE /3 3/3 3/3 3/3' 3/3 3/3                    scap retraction stand foam roller 30x 30x 30x 30x 30x 30x       Open book     nv 10x ea 10x ea       Thoracic rotations & ext    NV         DNF    NV         Ther Activity                                       Gait Training                                       Modalities

## 2023-02-27 ENCOUNTER — OFFICE VISIT (OUTPATIENT)
Dept: PHYSICAL THERAPY | Age: 33
End: 2023-02-27

## 2023-02-27 DIAGNOSIS — R51.9 CRANIOFACIAL PAIN: ICD-10-CM

## 2023-02-27 DIAGNOSIS — R51.9 LEFT-SIDED FACE PAIN: ICD-10-CM

## 2023-02-27 DIAGNOSIS — G89.29 CHRONIC NECK PAIN: ICD-10-CM

## 2023-02-27 DIAGNOSIS — M79.18 MUSCULOSKELETAL PAIN: ICD-10-CM

## 2023-02-27 DIAGNOSIS — G51.8 CRANIOFACIAL PAIN SYNDROME: Primary | ICD-10-CM

## 2023-02-27 DIAGNOSIS — M25.512 CHRONIC LEFT SHOULDER PAIN: ICD-10-CM

## 2023-02-27 DIAGNOSIS — M54.2 CHRONIC NECK PAIN: ICD-10-CM

## 2023-02-27 DIAGNOSIS — G89.29 CHRONIC LEFT SHOULDER PAIN: ICD-10-CM

## 2023-02-27 NOTE — PROGRESS NOTES
Daily Note     Today's date: 2023  Patient name: Fabi Wiseman  : 1990  MRN: [id number]  Referring provider: Arcadio Porter MD  Dx:   Encounter Diagnosis     ICD-10-CM    1  Craniofacial pain syndrome  G51 8       2  Craniofacial pain  R51 9       3  Chronic neck pain  M54 2     G89 29       4  Chronic left shoulder pain  M25 512     G89 29       5  Musculoskeletal pain  M79 18       6  Left-sided face pain  R51 9           Start Time: 08  Stop Time: 170  Total time in clinic (min): 46 minutes    Subjective: Patient reports she is tight on left side of neck and trap but otherwise doing ok  She has adaptive equipment at home to assist her to relax muscles  Objective: See treatment diary below      Assessment: Tolerated treatment well  Patient would benefit from continued PT  Tight with spasm in left trap but less than the past few months  Prepare for discharge next visit  Plan: Continue per plan of care        Precautions: MPS, concussion and mild TBI 4 years ago,     Manuals      neck 10 10 15 15' 15 SG PP SG     CST/MFR      SG PP SG     L TMJ/ear pulls  5 5 5' 5 SG PP 5 SG     Seated and prone lower c-spine/upper t-spine 5 5           Neuro Re-Ed             KT tape L SCM and trap                                                                 Ther Ex             snag 1' 2'           Posture overcorrect 10'   Review/ t/o   10' 30x     HEP 5'            Chin tucks supine             Half roller supine stretch home            No monies home            TB rows Blue 30x 30x 30x blk 30x blk 30x 30x  30x     TB shld extension Blue 30x 30x 30x blk 30x blk 30x 30x  30x     UBE 4/3 3/3 3/3 3/3' 3/3 3/3  3/3                  scap retraction stand foam roller 30x 30x 30x 30x 30x 30x  30x     Open book     nv 10x ea 10x ea  10x     Thoracic rotations & ext    NV         DNF    NV         Ther Activity                                       Gait Training Modalities

## 2023-03-01 ENCOUNTER — OFFICE VISIT (OUTPATIENT)
Dept: PHYSICAL THERAPY | Age: 33
End: 2023-03-01

## 2023-03-01 DIAGNOSIS — M25.512 CHRONIC LEFT SHOULDER PAIN: ICD-10-CM

## 2023-03-01 DIAGNOSIS — M54.2 CHRONIC NECK PAIN: Primary | ICD-10-CM

## 2023-03-01 DIAGNOSIS — M79.18 MUSCULOSKELETAL PAIN: ICD-10-CM

## 2023-03-01 DIAGNOSIS — G89.29 CHRONIC NECK PAIN: Primary | ICD-10-CM

## 2023-03-01 DIAGNOSIS — R51.9 LEFT-SIDED FACE PAIN: ICD-10-CM

## 2023-03-01 DIAGNOSIS — G89.29 CHRONIC LEFT SHOULDER PAIN: ICD-10-CM

## 2023-03-01 NOTE — PROGRESS NOTES
Daily Note/Discharge     Today's date: 3/1/2023  Patient name: Lorrie Alexander  : 1990  MRN: [id number]  Referring provider: Carlyn Bolivar MD  Dx:   Encounter Diagnosis     ICD-10-CM    1  Chronic neck pain  M54 2     G89 29       2  Musculoskeletal pain  M79 18       3  Chronic left shoulder pain  M25 512     G89 29       4  Left-sided face pain  R51 9           Start Time: 1615  Stop Time: 1714  Total time in clinic (min): 59 minutes    Subjective: Patient reports her right trap has been tight today  She is compliant with HEP  Objective: See treatment diary below      Assessment: Tolerated treatment well  Patient reached max potential at this facility  she will continue her PT at another site specilaizing in TMJ and cranialsacral PT  tightness in both traps noted but less tightness in left SCM and scalenes  Reviewed HEP with her  Plan: Discharge PT at this facility       Precautions: MPS, concussion and mild TBI 4 years ago,     Manuals 1/12 1/16 1/20 1/30 2/2 2/20 2/22 2/27 3/1    neck 10 10 15 15' 15 SG PP SG SG    CST/MFR      SG PP SG SG    L TMJ/ear pulls  5 5 5' 5 SG PP 5 SG SG    Seated and prone lower c-spine/upper t-spine 5 5           Neuro Re-Ed             KT tape L SCM and trap                                                                 Ther Ex             snag 1' 2'           Posture overcorrect 10'   Review/ t/o   10' 30x     HEP 5'            Chin tucks supine             Half roller supine stretch home            No monies home            TB rows Blue 30x 30x 30x blk 30x blk 30x 30x  30x 30x    TB shld extension Blue 30x 30x 30x blk 30x blk 30x 30x  30x 30x    UBE 4/3 3/3 3/3 3/3' 3/3 3/3  3/3 3/3                 scap retraction stand foam roller 30x 30x 30x 30x 30x 30x  30x 30x    Open book     nv 10x ea 10x ea  10x 10x    Thoracic rotations & ext    NV         DNF    NV         Ther Activity                                       Gait Training Modalities

## 2023-03-13 ENCOUNTER — OFFICE VISIT (OUTPATIENT)
Dept: PHYSICAL THERAPY | Facility: MEDICAL CENTER | Age: 33
End: 2023-03-13

## 2023-03-13 DIAGNOSIS — G51.8 CRANIOFACIAL PAIN SYNDROME: ICD-10-CM

## 2023-03-13 DIAGNOSIS — G89.29 CHRONIC NECK PAIN: Primary | ICD-10-CM

## 2023-03-13 DIAGNOSIS — M25.512 CHRONIC LEFT SHOULDER PAIN: ICD-10-CM

## 2023-03-13 DIAGNOSIS — M54.2 CHRONIC NECK PAIN: Primary | ICD-10-CM

## 2023-03-13 DIAGNOSIS — G89.29 CHRONIC LEFT SHOULDER PAIN: ICD-10-CM

## 2023-03-13 DIAGNOSIS — R51.9 CRANIOFACIAL PAIN: ICD-10-CM

## 2023-03-13 NOTE — PROGRESS NOTES
Daily Note     Today's date: 3/13/2023  Patient name: Jocelyne Harvey  : 1990  MRN: [id number]  Referring provider: Cristina Sims MD  Dx:   Encounter Diagnosis     ICD-10-CM    1  Chronic neck pain  M54 2     G89 29       2  Chronic left shoulder pain  M25 512     G89 29       3  Craniofacial pain  R51 9       4  Craniofacial pain syndrome  G51 8                      Assessment:   Problem List:  1) poor stress management --> UE hypertonicity - addressing with neuromotor retraining   2) poor postural control - addressing with neuromotor retraining   3) C1-2 hypomobility (L) - addressing with mobs and mobility exercises   4) C5-T5 hypomobility - addressing with mobs and mobility exercises   5) L posterior shoulder tightness - addressing with mobs and mobility exercises   6) poor L TMJ movement coordination (w/ ADDwR) - addressing with neuromotor retraining   7) poor cervicothoracic movement coordination - addressing with neuromotor retraining      Comparable signs:  1) chewing  2) turning her head to the side     Goals  Patient will be independent with home exercise program  - in progress  Patient will be able to manage symptoms independently  - in progress  Patient will be able to chew without limitation due to pain  - in progress  Patient will be able to turn to look over a shoulder to back out of a parking space without limitation due to pain  - in progress    Plan: Continue per plan of care  Subjective: Patient reports she had increased pain after this weekend, but left her night splint at home  Additionally, she has had increased stress as well  Thus, she took 1600mg of ibuprofen already today with minimal benefit to her headache          Objective: See treatment diary below      Precautions: none    Date: 3/13      Manual       SOR/MFR SK      C1-2 SNAG       UPA SK      H/A SNAG SK             Neuromuscular Re-education       Diaphragmatic breathing SK      Progressive relaxation SK scap W 10      Cervical retraction 20             TMJ relaxed position SK      TMJ controlled opening SK      Lingual elevation SK                    Therapeutic Exercise                                   Therapeutic Activities                     Gait Training                     Modalities        10'

## 2023-03-20 ENCOUNTER — OFFICE VISIT (OUTPATIENT)
Dept: PHYSICAL THERAPY | Facility: MEDICAL CENTER | Age: 33
End: 2023-03-20

## 2023-03-20 DIAGNOSIS — R51.9 CRANIOFACIAL PAIN: ICD-10-CM

## 2023-03-20 DIAGNOSIS — G51.8 CRANIOFACIAL PAIN SYNDROME: ICD-10-CM

## 2023-03-20 DIAGNOSIS — M25.512 CHRONIC LEFT SHOULDER PAIN: ICD-10-CM

## 2023-03-20 DIAGNOSIS — G89.29 CHRONIC NECK PAIN: Primary | ICD-10-CM

## 2023-03-20 DIAGNOSIS — G89.29 CHRONIC LEFT SHOULDER PAIN: ICD-10-CM

## 2023-03-20 DIAGNOSIS — M54.2 CHRONIC NECK PAIN: Primary | ICD-10-CM

## 2023-03-20 NOTE — PROGRESS NOTES
Daily Note     Today's date: 3/20/2023  Patient name: Nhung Jeffrey  : 1990  MRN: [id number]  Referring provider: Eli Mendiola MD  Dx:   Encounter Diagnosis     ICD-10-CM    1  Chronic neck pain  M54 2     G89 29       2  Chronic left shoulder pain  M25 512     G89 29       3  Craniofacial pain  R51 9       4  Craniofacial pain syndrome  G51 8                      Assessment:   Problem List:  1) poor stress management --> UE hypertonicity - addressing with neuromotor retraining   2) poor postural control - addressing with neuromotor retraining   3) C1-2 hypomobility (L) - addressing with mobs and mobility exercises   4) C5-T5 hypomobility - addressing with mobs and mobility exercises   5) L posterior shoulder tightness - addressing with mobs and mobility exercises   6) poor L TMJ movement coordination (w/ ADDwR) - addressing with neuromotor retraining   7) poor cervicothoracic movement coordination - addressing with neuromotor retraining      Comparable signs:  1) chewing - still problematic  2) turning her head to the side - improved by end of session    Goals  Patient will be independent with home exercise program  - in progress  Patient will be able to manage symptoms independently  - in progress  Patient will be able to chew without limitation due to pain  - in progress  Patient will be able to turn to look over a shoulder to back out of a parking space without limitation due to pain  - in progress    Plan: Continue per plan of care  Subjective: Patient reports the tension H/A resolved the following day after last visit and she has been limiting it due to doing the exercises  She still has difficulty turning her neck though          Objective: See treatment diary below      Precautions: none    Date: 3/20      Manual       SOR/MFR SK      C1-2 SNAG       UPA SK      H/A SNAG SK             Neuromuscular Re-education       Diaphragmatic breathing SK      Progressive relaxation SK      scap W 10      Cervical retraction w/ ext 20             TMJ relaxed position SK      TMJ controlled opening SK      Lingual elevation SK                    Therapeutic Exercise                                   Therapeutic Activities                     Gait Training                     Modalities        10'

## 2023-03-27 ENCOUNTER — OFFICE VISIT (OUTPATIENT)
Dept: PHYSICAL THERAPY | Facility: MEDICAL CENTER | Age: 33
End: 2023-03-27

## 2023-03-27 DIAGNOSIS — M25.512 CHRONIC LEFT SHOULDER PAIN: ICD-10-CM

## 2023-03-27 DIAGNOSIS — R51.9 CRANIOFACIAL PAIN: ICD-10-CM

## 2023-03-27 DIAGNOSIS — M54.2 CHRONIC NECK PAIN: Primary | ICD-10-CM

## 2023-03-27 DIAGNOSIS — G89.29 CHRONIC LEFT SHOULDER PAIN: ICD-10-CM

## 2023-03-27 DIAGNOSIS — G51.8 CRANIOFACIAL PAIN SYNDROME: ICD-10-CM

## 2023-03-27 DIAGNOSIS — G89.29 CHRONIC NECK PAIN: Primary | ICD-10-CM

## 2023-03-28 NOTE — PROGRESS NOTES
Daily Note     Today's date: 3/27/2023  Patient name: Coni Blum  : 1990  MRN: [id number]  Referring provider: TAMMY Johnson  Dx:   Encounter Diagnosis     ICD-10-CM    1  Chronic neck pain  M54 2     G89 29       2  Chronic left shoulder pain  M25 512     G89 29       3  Craniofacial pain  R51 9       4  Craniofacial pain syndrome  G51 8                      Assessment:   Problem List:  1) poor stress management --> UE hypertonicity - addressing with neuromotor retraining   2) poor postural control - addressing with neuromotor retraining   3) C1-2 hypomobility (L) - addressing with mobs and mobility exercises   4) C5-T5 hypomobility - addressing with mobs and mobility exercises   5) L posterior shoulder tightness - addressing with mobs and mobility exercises   6) poor L TMJ movement coordination (w/ ADDwR) - addressing with neuromotor retraining   7) poor cervicothoracic movement coordination - addressing with neuromotor retraining      Comparable signs:  1) chewing - still problematic  2) turning her head to the side - improved by end of session    Goals  Patient will be independent with home exercise program  - in progress  Patient will be able to manage symptoms independently  - in progress  Patient will be able to chew without limitation due to pain  - in progress  Patient will be able to turn to look over a shoulder to back out of a parking space without limitation due to pain  - in progress    Plan: Continue per plan of care  Subjective: Patient reports she has noticed improvement with decreased headaches           Objective: See treatment diary below      Precautions: none    Date: 3/27      Manual       SOR/MFR SK      UPA SK      H/A SNAG SK             Neuromuscular Re-education       Diaphragmatic breathing SK      Progressive relaxation SK      scap W 10      Cervical retraction w/ ext 20      Quadruped plus 20                           TMJ relaxed position SK      TMJ controlled opening SK      Lingual elevation SK                    Therapeutic Exercise                                   Therapeutic Activities                     Gait Training                     Modalities       MH

## 2023-04-06 ENCOUNTER — OFFICE VISIT (OUTPATIENT)
Dept: PHYSICAL THERAPY | Facility: MEDICAL CENTER | Age: 33
End: 2023-04-06

## 2023-04-06 DIAGNOSIS — M54.2 CHRONIC NECK PAIN: Primary | ICD-10-CM

## 2023-04-06 DIAGNOSIS — G89.29 CHRONIC NECK PAIN: Primary | ICD-10-CM

## 2023-04-06 DIAGNOSIS — G89.29 CHRONIC LEFT SHOULDER PAIN: ICD-10-CM

## 2023-04-06 DIAGNOSIS — R51.9 CRANIOFACIAL PAIN: ICD-10-CM

## 2023-04-06 DIAGNOSIS — G51.8 CRANIOFACIAL PAIN SYNDROME: ICD-10-CM

## 2023-04-06 DIAGNOSIS — M25.512 CHRONIC LEFT SHOULDER PAIN: ICD-10-CM

## 2023-04-06 NOTE — PROGRESS NOTES
Daily Note     Today's date: 2023  Patient name: Gera Abdi  : 1990  MRN: [id number]  Referring provider: Trudie Moritz, CRNP  Dx:   Encounter Diagnosis     ICD-10-CM    1  Chronic neck pain  M54 2     G89 29       2  Chronic left shoulder pain  M25 512     G89 29       3  Craniofacial pain  R51 9       4  Craniofacial pain syndrome  G51 8                      Assessment:   Problem List:  1) poor stress management --> UE hypertonicity - addressing with neuromotor retraining   2) poor postural control - addressing with neuromotor retraining - enhanced with self-assisted supine retraction over edge of table - improved by end of session  3) C1-2 hypomobility (L) - addressing with mobs and mobility exercises - enhanced with self-assisted supine retraction over edge of table - improved by end of session  4) C5-T5 hypomobility - addressing with mobs and mobility exercises - enhanced with self-assisted supine retraction over edge of table - improved by end of session  5) L posterior shoulder tightness - addressing with mobs and mobility exercises   6) poor L TMJ movement coordination (w/ ADDwR) - addressing with neuromotor retraining   7) poor cervicothoracic movement coordination - addressing with neuromotor retraining      Comparable signs:  1) chewing - still problematic  2) turning her head to the side - improved by end of session    Goals  Patient will be independent with home exercise program  - in progress  Patient will be able to manage symptoms independently  - in progress  Patient will be able to chew without limitation due to pain  - in progress  Patient will be able to turn to look over a shoulder to back out of a parking space without limitation due to pain  - in progress    Plan: Continue per plan of care  Subjective: Patient reports she has not had a headache since last visit, but still has craniofacial pain and mid back/neck pain        Objective: See treatment diary below      Precautions: none    Date: 4/6      Manual       SOR/MFR SK      UPA SK      H/A SNAG       Cervical retraction with distraction SK                           Neuromuscular Re-education       Diaphragmatic breathing SK      Progressive relaxation SK      scap W 10      Cervical retraction w/ ext 20      Wall plus 20      Self-assisted retraction over edge of table 10                    TMJ relaxed position SK      TMJ controlled opening SK      Lingual elevation SK      TMJ rhythmic stab SK             Therapeutic Exercise                                   Therapeutic Activities                     Gait Training                     Modalities       MH

## 2023-04-10 ENCOUNTER — APPOINTMENT (OUTPATIENT)
Dept: PHYSICAL THERAPY | Facility: MEDICAL CENTER | Age: 33
End: 2023-04-10

## 2023-04-22 PROBLEM — H10.32 ACUTE BACTERIAL CONJUNCTIVITIS OF LEFT EYE: Status: RESOLVED | Noted: 2023-02-21 | Resolved: 2023-04-22

## 2023-04-22 PROBLEM — J06.9 ACUTE URI: Status: RESOLVED | Noted: 2023-02-21 | Resolved: 2023-04-22

## 2023-04-27 ENCOUNTER — OFFICE VISIT (OUTPATIENT)
Dept: PHYSICAL THERAPY | Facility: MEDICAL CENTER | Age: 33
End: 2023-04-27

## 2023-04-27 DIAGNOSIS — G51.8 CRANIOFACIAL PAIN SYNDROME: ICD-10-CM

## 2023-04-27 DIAGNOSIS — G89.29 CHRONIC LEFT SHOULDER PAIN: ICD-10-CM

## 2023-04-27 DIAGNOSIS — M54.2 CHRONIC NECK PAIN: Primary | ICD-10-CM

## 2023-04-27 DIAGNOSIS — M25.512 CHRONIC LEFT SHOULDER PAIN: ICD-10-CM

## 2023-04-27 DIAGNOSIS — G89.29 CHRONIC NECK PAIN: Primary | ICD-10-CM

## 2023-04-27 DIAGNOSIS — R51.9 CRANIOFACIAL PAIN: ICD-10-CM

## 2023-04-27 NOTE — PROGRESS NOTES
Daily Note     Today's date: 2023  Patient name: Sophie Rhodes  : 1990  MRN: [id number]  Referring provider: TAMMY Alejo  Dx:   Encounter Diagnosis     ICD-10-CM    1  Chronic neck pain  M54 2     G89 29       2  Chronic left shoulder pain  M25 512     G89 29       3  Craniofacial pain  R51 9       4  Craniofacial pain syndrome  G51 8                      Assessment:   Problem List:  1) poor stress management --> UE hypertonicity - addressing with neuromotor retraining   2) poor postural control - addressing with neuromotor retraining - enhanced with self-assisted supine retraction over edge of table - improved by end of session  3) C1-2 hypomobility (L) - addressing with mobs and mobility exercises - enhanced with self-assisted supine retraction over edge of table - improved by end of session  4) C5-T5 hypomobility - addressing with mobs and mobility exercises - enhanced with self-assisted supine retraction over edge of table - improved by end of session  5) L posterior shoulder tightness - addressing with mobs and mobility exercises   6) poor L TMJ movement coordination (w/ ADDwR) - addressing with neuromotor retraining   7) poor cervicothoracic movement coordination - addressing with neuromotor retraining      Comparable signs:  1) chewing - still problematic  2) turning her head to the side - improved by end of session    Goals  Patient will be independent with home exercise program  - in progress  Patient will be able to manage symptoms independently  - in progress - this is now her greatest limitation  Patient will be able to chew without limitation due to pain  - met  Patient will be able to turn to look over a shoulder to back out of a parking space without limitation due to pain  - met    Plan: Continue per plan of care  Subjective: Patient reports she had no pain at all for 4 days after last visit  However, then on Monday she awoke with increased pain    The home program was able to decrease the intensity but not resolve it        Objective: See treatment diary below      Precautions: none    Date: 4/27      Manual       SOR/MFR SK      UPA SK      H/A SNAG       Cervical retraction with distraction SK      Supine thoracic PA SK                    Neuromuscular Re-education       Diaphragmatic breathing SK      Progressive relaxation SK      scap W 10      Cervical retraction w/ ext 20      Wall plus 20      Self-assisted retraction over edge of table 10                    TMJ relaxed position SK      TMJ controlled opening SK      Lingual elevation SK      TMJ rhythmic stab SK      TMJ protrusion SK             Therapeutic Exercise                                   Therapeutic Activities                     Gait Training                     Modalities       MH

## 2023-06-30 ENCOUNTER — OFFICE VISIT (OUTPATIENT)
Dept: URGENT CARE | Facility: CLINIC | Age: 33
End: 2023-06-30
Payer: COMMERCIAL

## 2023-06-30 VITALS
SYSTOLIC BLOOD PRESSURE: 113 MMHG | DIASTOLIC BLOOD PRESSURE: 72 MMHG | OXYGEN SATURATION: 100 % | WEIGHT: 212 LBS | HEART RATE: 104 BPM | BODY MASS INDEX: 36.39 KG/M2

## 2023-06-30 DIAGNOSIS — L24.9 IRRITANT CONTACT DERMATITIS, UNSPECIFIED TRIGGER: Primary | ICD-10-CM

## 2023-06-30 PROCEDURE — 99203 OFFICE O/P NEW LOW 30 MIN: CPT

## 2023-06-30 RX ORDER — PREDNISONE 20 MG/1
40 TABLET ORAL DAILY
Qty: 10 TABLET | Refills: 0 | Status: SHIPPED | OUTPATIENT
Start: 2023-06-30 | End: 2023-07-05

## 2023-06-30 NOTE — PROGRESS NOTES
[facility]    NAME: Sally Jara is a 27 y o  female  : 1990    MRN: [id number]  DATE: 2023  TIME: 9:31 AM    Assessment and Plan   Irritant contact dermatitis, unspecified trigger [L24 9]  1  Irritant contact dermatitis, unspecified trigger  predniSONE 20 mg tablet        Suspected contact dermatitis  Started on steroids for symptoms - complete entire course and do not stop or your rash may come back  Continue supportive measures such as calamine lotion or oatmeal baths to the area  Follow up with PCP if not improving  Patient Instructions     Take all tablets of prednisone at the same time for each day, as prescribed  Recommend taking in the morning, with food  If you are also taking an anti-inflammatory such as Aleve or ibuprofen, recommend stopping or only using over the counter doses while you are on higher doses of prednisone (40-60mg)  May use Benadryl and/oe Zyrtec as needed for itching  Be careful of drowsiness especially with Benadryl; do not take before driving or operating heavy machinery  May use cool compresses, oatmeal baths, calamine for poison ivy and emollients such as Aveeno oatmeal for eczema as needed for comfort  For poison ivy can use Tecnu cream (OTC) to wash after potential contact with poison ivy  Try to use unscented/sensitive formula soaps, lotions, and detergents  No new medications  Limit hot showers  Follow up with PCP in 5-7 days if symptoms are not improving or sooner with fever, thick yellow pus drainage, or any other concern for infection  Chief Complaint     Chief Complaint   Patient presents with   • Rash     Pt c/o rash all over her body, started yesterday  It is itchy  History of Present Illness       Presents with widespread body rash that began yesterday  She noted small area of rash/itching to her left ankle  She got a PPD placed for work on left forearm   Then rash has spread to bilateral upper feet/legs, abdomen, arms  Area it itchy  Taking benadryl without relief  On long doses of steroids she noted psych behaviors, suicidal thoughts, and adrenal issues in the past, not noted on short does  No known new substances but she was out in a garden  Review of Systems   Review of Systems   Constitutional: Negative for fever  HENT: Negative for congestion  Respiratory: Negative for cough and shortness of breath  Cardiovascular: Negative for chest pain  Skin: Positive for rash  Psychiatric/Behavioral: Negative for confusion           Current Medications       Current Outpatient Medications:   •  predniSONE 20 mg tablet, Take 2 tablets (40 mg total) by mouth daily for 5 days, Disp: 10 tablet, Rfl: 0  •  Cetirizine HCl (ZyrTEC Allergy) 10 MG CAPS, , Disp: , Rfl:   •  diazepam (VALIUM) 5 mg tablet, Take 1 tablet (5 mg total) by mouth every 8 (eight) hours as needed for anxiety (Patient not taking: Reported on 10/31/2022), Disp: 30 tablet, Rfl: 0  •  ergocalciferol (VITAMIN D2) 50,000 units, Take 1 capsule (50,000 Units total) by mouth once a week (Patient not taking: Reported on 10/31/2022), Disp: 12 capsule, Rfl: 1  •  ibuprofen (MOTRIN) 800 mg tablet, Take 1 tablet by mouth 3 (three) times a day as needed, Disp: , Rfl:   •  LORazepam (ATIVAN) 0 5 mg tablet, Take 1 tablet (0 5 mg total) by mouth every 8 (eight) hours as needed for anxiety, Disp: 60 tablet, Rfl: 0  •  montelukast (SINGULAIR) 10 mg tablet, Take 1 tablet (10 mg total) by mouth daily at bedtime (Patient not taking: Reported on 10/31/2022), Disp: 30 tablet, Rfl: 1    Current Allergies     Allergies as of 06/30/2023 - Reviewed 06/30/2023   Allergen Reaction Noted   • Fioricet [butalbital-apap-caffeine] Itching 06/30/2016   • Fioricet [butalbital-apap-caffeine]  12/10/2018            The following portions of the patient's history were reviewed and updated as appropriate: allergies, current medications, past family history, past medical history, past social history, past surgical history and problem list      Past Medical History:   Diagnosis Date   • Allergic rhinitis    • Brachial plexopathy     radiculopathy   • Concussion 2018    mild memory deficit   • Dizziness and giddiness    • 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       Past Surgical History:   Procedure Laterality Date   •  SECTION      x2       Family History   Problem Relation Age of Onset   • Diabetes type II Mother    • Deafness Father    • Depression Father    • Asthma Son          Medications have been verified  Objective   /72   Pulse 104   Wt 96 2 kg (212 lb)   SpO2 100%   BMI 36 39 kg/m²        Physical Exam     Physical Exam  Vitals reviewed  Constitutional:       Appearance: Normal appearance  Cardiovascular:      Rate and Rhythm: Normal rate  Pulmonary:      Effort: Pulmonary effort is normal    Musculoskeletal:         General: Normal range of motion  Skin:     General: Skin is warm and dry  Capillary Refill: Capillary refill takes less than 2 seconds  Findings: Rash present  Comments: uticarial rash to bilateral feet (not palmar surface), legs, abdomen, back and chest  No crusting, weeping, discharge  Neurological:      General: No focal deficit present  Mental Status: She is alert and oriented to person, place, and time     Psychiatric:         Mood and Affect: Mood normal          Behavior: Behavior normal

## 2023-06-30 NOTE — LETTER
June 30, 2023     Patient: Adelaida Olivo   YOB: 1990   Date of Visit: 6/30/2023       To Whom it May Concern:    Adelaida Olivo was seen in my clinic on 6/30/2023  She should be excused 6/30/23  If you have any questions or concerns, please don't hesitate to call           Sincerely,          TAMMY Aguilar        CC: No Recipients

## 2023-06-30 NOTE — PATIENT INSTRUCTIONS
Take all tablets of prednisone at the same time for each day, as prescribed  Recommend taking in the morning, with food  If you are also taking an anti-inflammatory such as Aleve or ibuprofen, recommend stopping or only using over the counter doses while you are on higher doses of prednisone (40-60mg)  May use Benadryl and/oe Zyrtec as needed for itching  Be careful of drowsiness especially with Benadryl; do not take before driving or operating heavy machinery  May use cool compresses, oatmeal baths, calamine for poison ivy and emollients such as Aveeno oatmeal for eczema as needed for comfort  For poison ivy can use Tecnu cream (OTC) to wash after potential contact with poison ivy  Try to use unscented/sensitive formula soaps, lotions, and detergents  No new medications  Limit hot showers  Follow up with PCP in 5-7 days if symptoms are not improving or sooner with fever, thick yellow pus drainage, or any other concern for infection

## 2023-07-03 DIAGNOSIS — Z11.1 TUBERCULOSIS SCREENING: Primary | ICD-10-CM

## 2023-07-11 ENCOUNTER — APPOINTMENT (OUTPATIENT)
Dept: LAB | Facility: HOSPITAL | Age: 33
End: 2023-07-11
Payer: COMMERCIAL

## 2023-07-11 DIAGNOSIS — Z11.1 TUBERCULOSIS SCREENING: ICD-10-CM

## 2023-07-11 PROCEDURE — 36415 COLL VENOUS BLD VENIPUNCTURE: CPT

## 2023-07-11 PROCEDURE — 86480 TB TEST CELL IMMUN MEASURE: CPT

## 2023-07-13 ENCOUNTER — RA CDI HCC (OUTPATIENT)
Dept: OTHER | Facility: HOSPITAL | Age: 33
End: 2023-07-13

## 2023-07-13 LAB
GAMMA INTERFERON BACKGROUND BLD IA-ACNC: 0.04 IU/ML
M TB IFN-G BLD-IMP: NEGATIVE
M TB IFN-G CD4+ BCKGRND COR BLD-ACNC: 0.03 IU/ML
M TB IFN-G CD4+ BCKGRND COR BLD-ACNC: 0.04 IU/ML
MITOGEN IGNF BCKGRD COR BLD-ACNC: >10 IU/ML

## 2023-07-13 NOTE — PROGRESS NOTES
[address] [facility] coding opportunities       Chart reviewed, no opportunity found: CHART REVIEWED, NO OPPORTUNITY FOUND        Patients Insurance        Commercial Insurance: [employer]hem[employer] Leavitt

## 2023-08-03 ENCOUNTER — OFFICE VISIT (OUTPATIENT)
Dept: FAMILY MEDICINE CLINIC | Facility: CLINIC | Age: 33
End: 2023-08-03
Payer: COMMERCIAL

## 2023-08-03 VITALS
SYSTOLIC BLOOD PRESSURE: 120 MMHG | WEIGHT: 214.2 LBS | OXYGEN SATURATION: 100 % | RESPIRATION RATE: 18 BRPM | TEMPERATURE: 96.6 F | HEIGHT: 64 IN | BODY MASS INDEX: 36.57 KG/M2 | HEART RATE: 86 BPM | DIASTOLIC BLOOD PRESSURE: 60 MMHG

## 2023-08-03 DIAGNOSIS — Z00.00 ENCOUNTER FOR WELL ADULT EXAM WITHOUT ABNORMAL FINDINGS: Primary | ICD-10-CM

## 2023-08-03 DIAGNOSIS — K90.49 GASTROINTESTINAL INTOLERANCE TO FOODS: Primary | ICD-10-CM

## 2023-08-03 DIAGNOSIS — R53.82 CHRONIC FATIGUE: ICD-10-CM

## 2023-08-03 DIAGNOSIS — R40.0 DAYTIME SOMNOLENCE: ICD-10-CM

## 2023-08-03 DIAGNOSIS — Z83.3 FAMILY HISTORY OF DIABETES MELLITUS IN MOTHER: ICD-10-CM

## 2023-08-03 PROCEDURE — 99395 PREV VISIT EST AGE 18-39: CPT | Performed by: NURSE PRACTITIONER

## 2023-08-03 NOTE — PROGRESS NOTES
BMI Counseling: Body mass index is 36.77 kg/m². The BMI is above normal. Nutrition recommendations include reducing portion sizes. Exercise recommendations include moderate aerobic physical activity for 150 minutes/week. ADULT ANNUAL PHYSICAL  [facility]    NAME: Mane Whaley  AGE: 27 y.o. SEX: female  : 1990     DATE: 8/3/2023     Assessment and Plan:     Problem List Items Addressed This Visit        Other    Chronic fatigue    Relevant Orders    Iron Panel (Includes Ferritin, Iron Sat%, Iron, and TIBC)    Vitamin D 25 hydroxy    Lyme Disease Serology w/Reflex   Other Visit Diagnoses     Encounter for well adult exam without abnormal findings    -  Primary    Relevant Orders    CBC and differential    Comprehensive metabolic panel    Lipid Panel with Direct LDL reflex    TSH, 3rd generation with Free T4 reflex    Daytime somnolence        Relevant Orders    Home Study    Family history of diabetes mellitus in mother        Relevant Orders    Hemoglobin A1C          Immunizations and preventive care screenings were discussed with patient today. Appropriate education was printed on patient's after visit summary. Counseling:  Alcohol/drug use: discussed moderation in alcohol intake, the recommendations for healthy alcohol use, and avoidance of illicit drug use. Dental Health: discussed importance of regular tooth brushing, flossing, and dental visits. Injury prevention: discussed safety/seat belts, safety helmets, smoke detectors, carbon dioxide detectors, and smoking near bedding or upholstery. Sexual health: discussed sexually transmitted diseases, partner selection, use of condoms, avoidance of unintended pregnancy, and contraceptive alternatives. · Exercise: the importance of regular exercise/physical activity was discussed. Recommend exercise 3-5 times per week for at least 30 minutes.        Depression Screening and Follow-up Plan: Patient was screened for depression during today's encounter. They screened negative with a PHQ-2 score of 2. Return in about 1 year (around 8/3/2024) for Annual physical.     Chief Complaint:     Chief Complaint   Patient presents with   • Physical Exam      History of Present Illness:     Adult Annual Physical   Patient here for a comprehensive physical exam. The patient reports problems - fatigue . Diet and Physical Activity  · Diet/Nutrition: well balanced diet. · Exercise: walking. Depression Screening  PHQ-2/9 Depression Screening    Little interest or pleasure in doing things: 1 - several days  Feeling down, depressed, or hopeless: 1 - several days  PHQ-2 Score: 2  PHQ-2 Interpretation: Negative depression screen       General Health  · Sleep: gets 7-8 hours of sleep on average. · Hearing: decreased - bilateral.  · Vision: wears glasses. · Dental: regular dental visits. /GYN Health  · Last menstrual period: regular  · Contraceptive method: none. · History of STDs?: no.     Review of Systems:     Review of Systems   Constitutional: Positive for fatigue. Negative for activity change, chills and fever. HENT: Negative for congestion, ear discharge, ear pain, sinus pressure, sinus pain, sore throat, tinnitus and trouble swallowing. Eyes: Negative for photophobia, pain, discharge, itching and visual disturbance. Respiratory: Negative for cough, chest tightness, shortness of breath and wheezing. Cardiovascular: Negative for chest pain and leg swelling. Gastrointestinal: Negative for abdominal distention, abdominal pain, constipation, diarrhea, nausea and vomiting. Endocrine: Negative for polydipsia, polyphagia and polyuria. Genitourinary: Negative for dysuria and frequency. Musculoskeletal: Positive for back pain. Negative for arthralgias, myalgias, neck pain and neck stiffness. Skin: Negative for color change.    Neurological: Negative for dizziness, syncope, weakness, numbness and headaches. Hematological: Does not bruise/bleed easily. Psychiatric/Behavioral: Positive for sleep disturbance. Negative for behavioral problems, confusion, self-injury and suicidal ideas. The patient is not nervous/anxious. 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      Past Surgical History:     Past Surgical History:   Procedure Laterality Date   •  SECTION      x2      Social History:     Social History     Socioeconomic History   • Marital status: Single     Spouse name: None   • Number of children: None   • Years of education: None   • Highest education level: None   Occupational History   • None   Tobacco Use   • Smoking status: Never   • Smokeless tobacco: Never   Vaping Use   • Vaping Use: Never used   Substance and Sexual Activity   • Alcohol use: Yes     Comment: social   • Drug use: No   • Sexual activity: None   Other Topics Concern   • None   Social History Narrative    ** Merged History Encounter **          Social Determinants of Health     Financial Resource Strain: Not on file   Food Insecurity: Not on file   Transportation Needs: Not on file   Physical Activity: Not on file   Stress: Not on file   Social Connections: Not on file   Intimate Partner Violence: Not on file   Housing Stability: Not on file      Family History:     Family History   Problem Relation Age of Onset   • Diabetes type II Mother    • Deafness Father    • Depression Father    • Asthma Son       Current Medications:     No current outpatient medications on file. No current facility-administered medications for this visit. Allergies:      Allergies   Allergen Reactions   • Fioricet [Butalbital-Apap-Caffeine] Itching   • Fioricet [Butalbital-Apap-Caffeine]       Physical Exam:     /60 (BP Location: Left arm, Patient Position: Sitting, Cuff Size: Standard)   Pulse 86   Temp (!) 96.6 °F (35.9 °C) (Tympanic)   Resp 18   Ht 5' 4" (1.626 m)   Wt 97.2 kg (214 lb 3.2 oz)   SpO2 100%   BMI 36.77 kg/m²     Physical Exam  Vitals and nursing note reviewed. Constitutional:       Appearance: She is well-developed. She is obese. HENT:      Head: Normocephalic and atraumatic. Right Ear: Tympanic membrane, ear canal and external ear normal.      Left Ear: Tympanic membrane, ear canal and external ear normal.      Nose: Nose normal.      Mouth/Throat:      Mouth: Mucous membranes are moist.   Eyes:      Extraocular Movements: Extraocular movements intact. Conjunctiva/sclera: Conjunctivae normal.      Pupils: Pupils are equal, round, and reactive to light. Cardiovascular:      Rate and Rhythm: Normal rate and regular rhythm. Pulses: Normal pulses. Heart sounds: Normal heart sounds. Pulmonary:      Effort: Pulmonary effort is normal.      Breath sounds: Normal breath sounds. No wheezing or rhonchi. Abdominal:      General: There is no distension. Palpations: Abdomen is soft. Tenderness: There is no abdominal tenderness. Musculoskeletal:         General: No swelling, tenderness, deformity or signs of injury. Normal range of motion. Cervical back: Normal range of motion and neck supple. Right lower leg: No edema. Left lower leg: No edema. Skin:     General: Skin is warm. Findings: No bruising, erythema, lesion or rash. Neurological:      General: No focal deficit present. Mental Status: She is alert and oriented to person, place, and time. Psychiatric:         Mood and Affect: Mood normal.         Behavior: Behavior normal.         Thought Content:  Thought content normal.         Judgment: Judgment normal.          Radha Ferraro, [address]

## 2023-09-02 ENCOUNTER — LAB (OUTPATIENT)
Dept: LAB | Facility: HOSPITAL | Age: 33
End: 2023-09-02
Payer: COMMERCIAL

## 2023-09-02 DIAGNOSIS — Z83.3 FAMILY HISTORY OF DIABETES MELLITUS IN MOTHER: ICD-10-CM

## 2023-09-02 DIAGNOSIS — K90.49 GASTROINTESTINAL INTOLERANCE TO FOODS: ICD-10-CM

## 2023-09-02 DIAGNOSIS — Z00.00 ENCOUNTER FOR WELL ADULT EXAM WITHOUT ABNORMAL FINDINGS: ICD-10-CM

## 2023-09-02 DIAGNOSIS — R53.82 CHRONIC FATIGUE: ICD-10-CM

## 2023-09-02 LAB
25(OH)D3 SERPL-MCNC: 51 NG/ML (ref 30–100)
ALBUMIN SERPL BCP-MCNC: 4.6 G/DL (ref 3.5–5)
ALP SERPL-CCNC: 76 U/L (ref 34–104)
ALT SERPL W P-5'-P-CCNC: 12 U/L (ref 7–52)
ANION GAP SERPL CALCULATED.3IONS-SCNC: 9 MMOL/L
AST SERPL W P-5'-P-CCNC: 13 U/L (ref 13–39)
BASOPHILS # BLD AUTO: 0.02 THOUSANDS/ÂΜL (ref 0–0.1)
BASOPHILS NFR BLD AUTO: 0 % (ref 0–1)
BILIRUB SERPL-MCNC: 0.87 MG/DL (ref 0.2–1)
BUN SERPL-MCNC: 11 MG/DL (ref 5–25)
CALCIUM SERPL-MCNC: 9.8 MG/DL (ref 8.4–10.2)
CHLORIDE SERPL-SCNC: 102 MMOL/L (ref 96–108)
CHOLEST SERPL-MCNC: 130 MG/DL
CO2 SERPL-SCNC: 26 MMOL/L (ref 21–32)
CREAT SERPL-MCNC: 0.72 MG/DL (ref 0.6–1.3)
EOSINOPHIL # BLD AUTO: 0.11 THOUSAND/ÂΜL (ref 0–0.61)
EOSINOPHIL NFR BLD AUTO: 1 % (ref 0–6)
ERYTHROCYTE [DISTWIDTH] IN BLOOD BY AUTOMATED COUNT: 13.4 % (ref 11.6–15.1)
FERRITIN SERPL-MCNC: 43 NG/ML (ref 11–307)
GFR SERPL CREATININE-BSD FRML MDRD: 109 ML/MIN/1.73SQ M
GLUCOSE SERPL-MCNC: 91 MG/DL (ref 65–140)
HCT VFR BLD AUTO: 44.4 % (ref 34.8–46.1)
HDLC SERPL-MCNC: 62 MG/DL
HGB BLD-MCNC: 14.1 G/DL (ref 11.5–15.4)
IMM GRANULOCYTES # BLD AUTO: 0.02 THOUSAND/UL (ref 0–0.2)
IMM GRANULOCYTES NFR BLD AUTO: 0 % (ref 0–2)
IRON SATN MFR SERPL: 18 % (ref 15–50)
IRON SERPL-MCNC: 63 UG/DL (ref 50–212)
LDLC SERPL CALC-MCNC: 47 MG/DL (ref 0–100)
LYMPHOCYTES # BLD AUTO: 2.47 THOUSANDS/ÂΜL (ref 0.6–4.47)
LYMPHOCYTES NFR BLD AUTO: 32 % (ref 14–44)
MCH RBC QN AUTO: 28 PG (ref 26.8–34.3)
MCHC RBC AUTO-ENTMCNC: 31.8 G/DL (ref 31.4–37.4)
MCV RBC AUTO: 88 FL (ref 82–98)
MONOCYTES # BLD AUTO: 0.34 THOUSAND/ÂΜL (ref 0.17–1.22)
MONOCYTES NFR BLD AUTO: 4 % (ref 4–12)
NEUTROPHILS # BLD AUTO: 4.73 THOUSANDS/ÂΜL (ref 1.85–7.62)
NEUTS SEG NFR BLD AUTO: 63 % (ref 43–75)
NRBC BLD AUTO-RTO: 0 /100 WBCS
PLATELET # BLD AUTO: 426 THOUSANDS/UL (ref 149–390)
PMV BLD AUTO: 9.3 FL (ref 8.9–12.7)
POTASSIUM SERPL-SCNC: 4 MMOL/L (ref 3.5–5.3)
PROT SERPL-MCNC: 8.3 G/DL (ref 6.4–8.4)
RBC # BLD AUTO: 5.04 MILLION/UL (ref 3.81–5.12)
SODIUM SERPL-SCNC: 137 MMOL/L (ref 135–147)
TIBC SERPL-MCNC: 353 UG/DL (ref 250–450)
TRIGL SERPL-MCNC: 104 MG/DL
TSH SERPL DL<=0.05 MIU/L-ACNC: 2.38 UIU/ML (ref 0.45–4.5)
UIBC SERPL-MCNC: 290 UG/DL (ref 155–355)
WBC # BLD AUTO: 7.69 THOUSAND/UL (ref 4.31–10.16)

## 2023-09-02 PROCEDURE — 36415 COLL VENOUS BLD VENIPUNCTURE: CPT

## 2023-09-02 PROCEDURE — 83550 IRON BINDING TEST: CPT

## 2023-09-02 PROCEDURE — 86618 LYME DISEASE ANTIBODY: CPT

## 2023-09-02 PROCEDURE — 81377 HLA II TYPE 1 AG EQUIV LR: CPT

## 2023-09-02 PROCEDURE — 82728 ASSAY OF FERRITIN: CPT

## 2023-09-02 PROCEDURE — 85025 COMPLETE CBC W/AUTO DIFF WBC: CPT

## 2023-09-02 PROCEDURE — 80061 LIPID PANEL: CPT

## 2023-09-02 PROCEDURE — 83540 ASSAY OF IRON: CPT

## 2023-09-02 PROCEDURE — 83036 HEMOGLOBIN GLYCOSYLATED A1C: CPT

## 2023-09-02 PROCEDURE — 80053 COMPREHEN METABOLIC PANEL: CPT

## 2023-09-02 PROCEDURE — 82306 VITAMIN D 25 HYDROXY: CPT

## 2023-09-02 PROCEDURE — 84443 ASSAY THYROID STIM HORMONE: CPT

## 2023-09-03 LAB — B BURGDOR IGG+IGM SER QL IA: NEGATIVE

## 2023-09-04 NOTE — RESULT ENCOUNTER NOTE
Thus far her labs are normal except her persistent elevation of platelets, ask her if it is okay for me to do an econsult with hematology to get there input on the persistent somewhat elevated levels.    She does have other labs still pending, will reach out if abnormal

## 2023-09-05 LAB
EST. AVERAGE GLUCOSE BLD GHB EST-MCNC: 105 MG/DL
HBA1C MFR BLD: 5.3 %

## 2023-09-06 DIAGNOSIS — D75.839 THROMBOCYTHEMIA: Primary | ICD-10-CM

## 2023-09-06 NOTE — PROGRESS NOTES
E-Consult Consent: Patient aware and consented that a consult is being performed on their behalf to hematology. The patient is aware that they may not see the provider face to face, but the provider may review their medical record and give recommendations. The patient may elect to see the provider in person if requested.

## 2023-09-07 ENCOUNTER — E-CONSULT (OUTPATIENT)
Dept: HEMATOLOGY ONCOLOGY | Facility: CLINIC | Age: 33
End: 2023-09-07
Payer: COMMERCIAL

## 2023-09-07 ENCOUNTER — TELEPHONE (OUTPATIENT)
Dept: FAMILY MEDICINE CLINIC | Facility: CLINIC | Age: 33
End: 2023-09-07

## 2023-09-07 DIAGNOSIS — D75.839 THROMBOCYTOSIS: Primary | ICD-10-CM

## 2023-09-07 DIAGNOSIS — D75.839 THROMBOCYTHEMIA: Primary | ICD-10-CM

## 2023-09-07 PROCEDURE — 99446 NTRPROF PH1/NTRNET/EHR 5-10: CPT | Performed by: INTERNAL MEDICINE

## 2023-09-07 NOTE — TELEPHONE ENCOUNTER
I spoke to patient she was made aware of message    ----- Message from [address] sent at 9/7/2023 12:52 PM EDT -----  Please let her know the e-consult is completed. At this time they recommend continual monitoring of platelet count and added in two inflammation markers, she can complete in three months. If platelets are over 420,180 they Would see for consult.

## 2023-09-07 NOTE — PROGRESS NOTES
E-Consult  Hallie Vargas 27 y.o. female MRN: [id number]  Encounter Date: 09/07/23      Reason for Consult / Principal Problem: thrombocytosis    Consulting Provider: Alida Rivera PA-C    Requesting Provider: Dutch Arellano       ASSESSMENT:  This is a 27year old female with no major PMH who we are E-consulted for due to thrombocytosis which has been present since at least 5/2022. PLT ranges between 395 to 450s Most recent labs --> 9/2/2023: WBC 7.69, Hgb 14.1, MCV 88, PLT 426K, diff normal. CMP normal. TSH normal. Iron panel completely normal. She does appear to have a history of menometrorrhagia per chart. No splenectomy history. Had an [address] abdomen 11/2022 showing normal spleen. No autoimmune disease history seen in chart. RECOMMENDATIONS:  I would consider testing for autoimmune conditions at this time as this thrombocytosis could be reactive from underlying inflammatory cause. Consider starting with sed rate, CRP and then just monitoring counts. Even though normal iron panel, if menometrorrhagia is severe enough, chronic bleeding could be related. I would not test for MPN unless PLT count is >500K consistently without clear cause. At that time, I would refer to hematology for in office visit. Total time spent 5-10 minutes, >50% of the total time devoted to medical consultative verbal/EMR discussion between providers. Written report will be generated in the EMR. Brie Mcclellan

## 2023-09-10 ENCOUNTER — HOSPITAL ENCOUNTER (OUTPATIENT)
Dept: MRI IMAGING | Facility: HOSPITAL | Age: 33
Discharge: HOME | End: 2023-09-10
Payer: COMMERCIAL

## 2023-09-10 DIAGNOSIS — M54.16 LUMBAR RADICULOPATHY: ICD-10-CM

## 2023-09-10 PROCEDURE — 72148 MRI LUMBAR SPINE W/O DYE: CPT

## 2023-09-10 PROCEDURE — G1004 CDSM NDSC: HCPCS

## 2023-09-12 ENCOUNTER — TELEPHONE (OUTPATIENT)
Dept: SLEEP CENTER | Facility: CLINIC | Age: 33
End: 2023-09-12

## 2023-09-12 NOTE — TELEPHONE ENCOUNTER
----- Message from Chente Gaines MD sent at 9/11/2023  4:36 PM EDT -----  Approved    ----- Message -----  From: Naheed Wilson  Sent: 7/3/6026  10:13 AM EDT  To: [facility] Provider    This Home sleep study needs approval.     If approved please sign and return to clerical pool. If denied please include reasons why. Also provide alternative testing if warranted. Please sign and return to clerical pool.

## 2023-09-13 LAB
ANNOTATION COMMENT IMP: NORMAL
HLA-DQ2 QL: NEGATIVE
HLA-DQ8 QL: POSITIVE
REF LAB TEST METHOD: NORMAL

## 2023-09-14 DIAGNOSIS — K90.0 CELIAC DISEASE: ICD-10-CM

## 2023-09-14 DIAGNOSIS — K90.49 GASTROINTESTINAL INTOLERANCE TO FOODS: Primary | ICD-10-CM

## 2023-10-30 ENCOUNTER — APPOINTMENT (OUTPATIENT)
Dept: RADIOLOGY | Facility: CLINIC | Age: 33
End: 2023-10-30
Payer: COMMERCIAL

## 2023-10-30 ENCOUNTER — CONSULT (OUTPATIENT)
Dept: PAIN MEDICINE | Facility: CLINIC | Age: 33
End: 2023-10-30
Payer: COMMERCIAL

## 2023-10-30 VITALS
SYSTOLIC BLOOD PRESSURE: 111 MMHG | HEART RATE: 67 BPM | WEIGHT: 202 LBS | BODY MASS INDEX: 34.49 KG/M2 | DIASTOLIC BLOOD PRESSURE: 60 MMHG | HEIGHT: 64 IN

## 2023-10-30 DIAGNOSIS — M53.3 SACROILIAC JOINT PAIN: ICD-10-CM

## 2023-10-30 DIAGNOSIS — M51.36 DDD (DEGENERATIVE DISC DISEASE), LUMBAR: ICD-10-CM

## 2023-10-30 DIAGNOSIS — M54.42 CHRONIC BILATERAL LOW BACK PAIN WITH BILATERAL SCIATICA: Primary | ICD-10-CM

## 2023-10-30 DIAGNOSIS — G89.4 CHRONIC PAIN SYNDROME: ICD-10-CM

## 2023-10-30 DIAGNOSIS — G89.29 CHRONIC BILATERAL LOW BACK PAIN WITH BILATERAL SCIATICA: Primary | ICD-10-CM

## 2023-10-30 DIAGNOSIS — M54.41 CHRONIC BILATERAL LOW BACK PAIN WITH BILATERAL SCIATICA: Primary | ICD-10-CM

## 2023-10-30 PROCEDURE — 99204 OFFICE O/P NEW MOD 45 MIN: CPT | Performed by: STUDENT IN AN ORGANIZED HEALTH CARE EDUCATION/TRAINING PROGRAM

## 2023-10-30 PROCEDURE — 72202 X-RAY EXAM SI JOINTS 3/> VWS: CPT
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
## 2023-11-02 DIAGNOSIS — E61.1 IRON DEFICIENCY: Primary | ICD-10-CM

## 2023-11-08 ENCOUNTER — TELEPHONE (OUTPATIENT)
Dept: PAIN MEDICINE | Facility: CLINIC | Age: 33
End: 2023-11-08

## 2023-11-08 NOTE — TELEPHONE ENCOUNTER
----- Message from Chely Martin MD sent at 11/8/2023 10:51 AM EST -----  XR of sacroiliac joints shows mild degenerative changes. Incidental finding of sclerotic osseous lesions throughout, previously noted on other imaging.

## 2023-11-10 NOTE — TELEPHONE ENCOUNTER
Caller: Jeanette Moore    Doctor: Daniel Guillen    Reason for call: Pt returning call     Call back#: [phone]

## 2023-11-22 ENCOUNTER — TRANSCRIBE ORDERS (OUTPATIENT)
Dept: PAIN MEDICINE | Facility: CLINIC | Age: 33
End: 2023-11-22

## 2023-11-22 ENCOUNTER — HOSPITAL ENCOUNTER (OUTPATIENT)
Dept: RADIOLOGY | Facility: HOSPITAL | Age: 33
Discharge: HOME | End: 2023-11-22
Attending: STUDENT IN AN ORGANIZED HEALTH CARE EDUCATION/TRAINING PROGRAM
Payer: COMMERCIAL

## 2023-11-22 VITALS
OXYGEN SATURATION: 99 % | SYSTOLIC BLOOD PRESSURE: 111 MMHG | HEART RATE: 76 BPM | TEMPERATURE: 97.7 F | DIASTOLIC BLOOD PRESSURE: 74 MMHG | RESPIRATION RATE: 16 BRPM

## 2023-11-22 DIAGNOSIS — M53.3 SACROILIAC JOINT PAIN: ICD-10-CM

## 2023-11-22 PROCEDURE — 27096 INJECT SACROILIAC JOINT: CPT | Performed by: STUDENT IN AN ORGANIZED HEALTH CARE EDUCATION/TRAINING PROGRAM

## 2023-11-22 RX ORDER — BUPIVACAINE HCL/PF 2.5 MG/ML
1 VIAL (ML) INJECTION ONCE
Status: COMPLETED | OUTPATIENT
Start: 2023-11-22 | End: 2023-11-22

## 2023-11-22 RX ORDER — LIDOCAINE HYDROCHLORIDE 10 MG/ML
3 INJECTION, SOLUTION EPIDURAL; INFILTRATION; INTRACAUDAL; PERINEURAL ONCE
Status: COMPLETED | OUTPATIENT
Start: 2023-11-22 | End: 2023-11-22

## 2023-11-22 RX ORDER — METHYLPREDNISOLONE ACETATE 40 MG/ML
40 INJECTION, SUSPENSION INTRA-ARTICULAR; INTRALESIONAL; INTRAMUSCULAR; PARENTERAL; SOFT TISSUE ONCE
Status: COMPLETED | OUTPATIENT
Start: 2023-11-22 | End: 2023-11-22

## 2023-11-22 RX ORDER — METHYLPREDNISOLONE ACETATE 80 MG/ML
80 INJECTION, SUSPENSION INTRA-ARTICULAR; INTRALESIONAL; INTRAMUSCULAR; PARENTERAL; SOFT TISSUE ONCE
Status: DISCONTINUED | OUTPATIENT
Start: 2023-11-22 | End: 2023-11-22

## 2023-11-22 RX ORDER — BUPIVACAINE HCL/PF 2.5 MG/ML
2 VIAL (ML) INJECTION ONCE
Status: DISCONTINUED | OUTPATIENT
Start: 2023-11-22 | End: 2023-11-22

## 2023-11-22 RX ORDER — LIDOCAINE HYDROCHLORIDE 10 MG/ML
5 INJECTION, SOLUTION EPIDURAL; INFILTRATION; INTRACAUDAL; PERINEURAL ONCE
Status: DISCONTINUED | OUTPATIENT
Start: 2023-11-22 | End: 2023-11-22

## 2023-11-22 RX ADMIN — IOHEXOL 0.5 ML: 300 INJECTION, SOLUTION INTRAVENOUS at 11:49

## 2023-11-22 RX ADMIN — METHYLPREDNISOLONE ACETATE 40 MG: 40 INJECTION, SUSPENSION INTRA-ARTICULAR; INTRALESIONAL; INTRAMUSCULAR; PARENTERAL; SOFT TISSUE at 11:49

## 2023-11-22 RX ADMIN — LIDOCAINE HYDROCHLORIDE 3 ML: 10 INJECTION, SOLUTION EPIDURAL; INFILTRATION; INTRACAUDAL; PERINEURAL at 11:48

## 2023-11-22 RX ADMIN — Medication 1 ML: at 11:49

## 2023-11-22 NOTE — H&P
History of Present Illness: The patient is a 27 y.o. female who presents with complaints of low back pain. 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 Current Facility-Administered Medications:     bupivacaine (PF) (MARCAINE) 0.25 % injection 2 mL, 2 mL, Intra-articular, Once, Kris Gupta MD    iohexol (OMNIPAQUE) 300 mg/mL injection 1 mL, 1 mL, Intra-articular, Once, Kris Gupta MD    lidocaine (PF) (XYLOCAINE-MPF) 1 % injection 5 mL, 5 mL, Infiltration, Once, Kris Gupta MD    methylPREDNISolone acetate (DEPO-MEDROL) injection 80 mg, 80 mg, Intra-articular, Once, Kris Gupta MD    Allergies   Allergen Reactions    Bactrim [Sulfamethoxazole-Trimethoprim] Hives    Fioricet [Butalbital-Apap-Caffeine] Itching    Fioricet [Butalbital-Apap-Caffeine]        Physical Exam: There were no vitals filed for this visit. General: Awake, Alert, Oriented x 3, Mood and affect appropriate  Respiratory: Respirations even and unlabored  Cardiovascular: Peripheral pulses intact; no edema  Musculoskeletal Exam: normal gait    ASA Score: 2    Patient/Chart Verification  Patient ID Verified: Verbal  Consents Confirmed: Procedural, To be obtained in the Pre-Procedure area  H&P( within 30 days) Verified: To be obtained in the Pre-Procedure area  Allergies Reviewed: Yes  Anticoag/NSAID held?: NA  Currently on antibiotics?: No  Pregnancy denied?: Yes    Assessment:   1.  Sacroiliac joint pain        Plan: Bilateral sacroiliac joint pain

## 2023-11-22 NOTE — DISCHARGE INSTRUCTIONS

## 2023-11-29 ENCOUNTER — TELEPHONE (OUTPATIENT)
Dept: PAIN MEDICINE | Facility: CLINIC | Age: 33
End: 2023-11-29

## 2023-12-13 ENCOUNTER — PATIENT MESSAGE (OUTPATIENT)
Dept: FAMILY MEDICINE CLINIC | Facility: CLINIC | Age: 33
End: 2023-12-13

## 2023-12-14 ENCOUNTER — OFFICE VISIT (OUTPATIENT)
Dept: GASTROENTEROLOGY | Facility: MEDICAL CENTER | Age: 33
End: 2023-12-14
Payer: COMMERCIAL

## 2023-12-14 VITALS
BODY MASS INDEX: 35.17 KG/M2 | SYSTOLIC BLOOD PRESSURE: 108 MMHG | WEIGHT: 206 LBS | DIASTOLIC BLOOD PRESSURE: 72 MMHG | HEART RATE: 72 BPM | HEIGHT: 64 IN | TEMPERATURE: 98.2 F

## 2023-12-14 DIAGNOSIS — R14.0 BLOATING: ICD-10-CM

## 2023-12-14 DIAGNOSIS — R19.5 LOOSE STOOLS: Primary | ICD-10-CM

## 2023-12-14 DIAGNOSIS — R10.9 ABDOMINAL CRAMPING: ICD-10-CM

## 2023-12-14 DIAGNOSIS — E61.1 IRON DEFICIENCY: ICD-10-CM

## 2023-12-14 DIAGNOSIS — B80 PINWORMS: Primary | ICD-10-CM

## 2023-12-14 PROCEDURE — 99243 OFF/OP CNSLTJ NEW/EST LOW 30: CPT | Performed by: STUDENT IN AN ORGANIZED HEALTH CARE EDUCATION/TRAINING PROGRAM

## 2023-12-14 RX ORDER — MELATONIN
1000 DAILY
COMMUNITY

## 2023-12-14 RX ORDER — DIPHENOXYLATE HYDROCHLORIDE AND ATROPINE SULFATE 2.5; .025 MG/1; MG/1
1 TABLET ORAL DAILY
COMMUNITY

## 2023-12-14 NOTE — PROGRESS NOTES
[facility] - Outpatient Consultation  Kassandra Segura 27 y.o. female MRN: [id number]  Encounter: [id number]      Assessment and Plan:    1. Loose stools    2. Bloating    3. Abdominal cramping    4. Iron deficiency        27 y.o. female w/ hx of chronic pain syndrome, sciatica, ROBBI who is referred to GI for chronic diarrhea, bloating, abdominal cramping, and possible celiac disease. Patient meets Davian IV criteria for IBS-D, and this is further supported by the chronicity of her symptoms. We discussed fiber supplementation and low FODMAP diet. I will check a fecal calprotectin and fecal elastase as well as stool O&P. Celiac disease is certainly a consideration, but she does not have this diagnosis as of yet. A positive DQ2/DQ8 test does not rule in celiac disease, and she will need to undergo serologic testing followed by duodenal biopsies for confirmation as appropriate. Testing may be somewhat less reliable given that she has been on a gluten-free diet for the last 2 months, so a gluten challenge may be necessary in the future. I offered EGD/colonoscopy given her iron deficiency, but she has a very good reason for her iron deficiency (menorrhagia) which has now resolved. Therefore, she defers endoscopic intervention at this time unless inflammatory markers are elevated. She otherwise does not have alarm features so this seems reasonable.     - Labs and stool studies as below  - Consider fiber supplementation  - Low FODMAP diet discussed    RTC in 3 months    Orders Placed This Encounter   Procedures    Calprotectin,Fecal    Pancreatic elastase, fecal    Ova and parasite examination    Celiac Disease Antibody Profile    C-reactive protein     ______________________________________________________________________    History of Present Illness:    Kassandra Segura is a 27 y.o. female w/ hx of chronic pain syndrome, sciatica, ROBBI who is referred to GI for chronic diarrhea, bloating, abdominal cramping, and possible celiac disease. Patient reports longstanding issues with diarrhea and urgency (particularly in the morning), bloating, and abdominal cramping often relieved with defecation since she was in her early 25s. She occasionally has minimal blood on the toilet paper when wiping but otherwise no hematochezia or melena. 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 Celiac serologies have not been checked. Patient started a gluten-free diet about 2 months ago and does feel that her stools have become more solid. No family history of colon cancer or IBD. No prior EGD or colonoscopy. Labs from 2022 show ferritin 24, TSAT 10% consistent with ROBBI. This has been attributed to severe menorrhagia, and iron studies appear to have improved with minimal iron supplementation. Patient is concerned that her son was recently found to have pinworm. Review of Systems:  As per HPI. Otherwise negative.       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     Past Surgical History:   Procedure Laterality Date     SECTION      x2     Social History   Social History     Substance and Sexual Activity   Alcohol Use Yes    Comment: social     Social History     Substance and Sexual Activity   Drug Use No     Social History     Tobacco Use   Smoking Status Never   Smokeless Tobacco Never     Family History   Problem Relation Age of Onset    Diabetes type II Mother     Deafness Father     Depression Father     Asthma Son        Meds/Allergies       Current Outpatient Medications:     cholecalciferol (VITAMIN D3) 1,000 units tablet    multivitamin (THERAGRAN) TABS    Allergies   Allergen Reactions    Bactrim [Sulfamethoxazole-Trimethoprim] Hives    Fioricet [Butalbital-Apap-Caffeine] Itching    Fioricet [Butalbital-Apap-Caffeine]            Objective     Blood pressure 108/72, pulse 72, temperature 98.2 °F (36.8 °C), temperature source Tympanic, height 5' 4" (1.626 m), weight 93.4 kg (206 lb). Body mass index is 35.36 kg/m².         Physical Exam:      General: No acute distress  Abdomen: Soft, non-specific mild tenderness in lower abdomen, non-distended, normoactive bowel sounds  Neuro: Awake, alert, oriented x 3    Lab Results:   Lab Results   Component Value Date/Time    WBC 7.69 09/02/2023 10:21 AM    HGB 14.1 09/02/2023 10:21 AM     (H) 09/02/2023 10:21 AM    SODIUM 137 09/02/2023 10:21 AM    K 4.0 09/02/2023 10:21 AM     09/02/2023 10:21 AM    CO2 26 09/02/2023 10:21 AM    BUN 11 09/02/2023 10:21 AM    CREATININE 0.72 09/02/2023 10:21 AM    AST 13 09/02/2023 10:21 AM    ALT 12 09/02/2023 10:21 AM    ALKPHOS 76 09/02/2023 10:21 AM    TBILI 0.87 09/02/2023 10:21 AM    ALB 4.6 09/02/2023 10:21 AM    FERRITIN 43 09/02/2023 10:21 AM    CONCFE 18 09/02/2023 10:21 AM    TIBC 353 09/02/2023 10:21 AM

## 2023-12-27 ENCOUNTER — TELEPHONE (OUTPATIENT)
Dept: GASTROENTEROLOGY | Facility: MEDICAL CENTER | Age: 33
End: 2023-12-27

## 2023-12-27 NOTE — TELEPHONE ENCOUNTER
Left a voicemail for patient, as she had not read my [employer] message from 12/16/23 (and had not picked up the phone that day either).    She had mentioned to me that her son had a likely pinworm infection. I wanted to offer treatment to her since recommendations are to treat family members living in close quarters regardless of symptoms.    The treatment is albendazole 400 mg once on an empty stomach, then repeat in 2 weeks.     Will also FYI her PCP.

## 2024-01-19 ENCOUNTER — OFFICE VISIT (OUTPATIENT)
Dept: FAMILY MEDICINE CLINIC | Facility: CLINIC | Age: 34
End: 2024-01-19
Payer: COMMERCIAL

## 2024-01-19 VITALS — RESPIRATION RATE: 20 BRPM | TEMPERATURE: 96.9 F | HEART RATE: 85 BPM | OXYGEN SATURATION: 99 %

## 2024-01-19 DIAGNOSIS — R05.9 COUGH IN ADULT: Primary | ICD-10-CM

## 2024-01-19 DIAGNOSIS — E78.2 MIXED HYPERLIPIDEMIA: ICD-10-CM

## 2024-01-19 DIAGNOSIS — E61.1 IRON DEFICIENCY: ICD-10-CM

## 2024-01-19 DIAGNOSIS — E55.9 VITAMIN D DEFICIENCY: ICD-10-CM

## 2024-01-19 DIAGNOSIS — U07.1 COVID-19 VIRUS INFECTION: ICD-10-CM

## 2024-01-19 DIAGNOSIS — D75.839 THROMBOCYTHEMIA: ICD-10-CM

## 2024-01-19 LAB
SARS-COV-2 AG UPPER RESP QL IA: POSITIVE
VALID CONTROL: ABNORMAL

## 2024-01-19 PROCEDURE — 87811 SARS-COV-2 COVID19 W/OPTIC: CPT | Performed by: FAMILY MEDICINE

## 2024-01-19 PROCEDURE — 99214 OFFICE O/P EST MOD 30 MIN: CPT | Performed by: FAMILY MEDICINE

## 2024-01-19 RX ORDER — NIRMATRELVIR AND RITONAVIR 300-100 MG
3 KIT ORAL 2 TIMES DAILY
Qty: 30 TABLET | Refills: 0 | Status: SHIPPED | OUTPATIENT
Start: 2024-01-19 | End: 2024-01-24

## 2024-01-19 RX ORDER — NIRMATRELVIR AND RITONAVIR 300-100 MG
3 KIT ORAL 2 TIMES DAILY
Qty: 30 TABLET | Refills: 0 | Status: SHIPPED | OUTPATIENT
Start: 2024-01-19 | End: 2024-01-19 | Stop reason: SDUPTHER

## 2024-01-19 NOTE — ASSESSMENT & PLAN NOTE
Patient continues with vitamin D at 5000 units daily now in light of COVID infection she has vitamin D deficiency follow-up vitamin D levels has future blood work scheduled

## 2024-01-19 NOTE — PROGRESS NOTES
COVID-19 Outpatient Progress Note    Assessment/Plan:    Problem List Items Addressed This Visit          Hematopoietic and Hemostatic    Thrombocythemia     Follow-up CBC at yearly visit         Relevant Orders    CBC and differential    Comprehensive metabolic panel       Other    Vitamin D deficiency     Patient continues with vitamin D at 5000 units daily now in light of COVID infection she has vitamin D deficiency follow-up vitamin D levels has future blood work scheduled         Relevant Orders    Vitamin D 25 hydroxy    Comprehensive metabolic panel    Iron deficiency     Follow-up with laboratory work continuing on same vitamin regimen         Relevant Orders    Iron Panel (Includes Ferritin, Iron Sat%, Iron, and TIBC)    Comprehensive metabolic panel    COVID-19 virus infection     COVID-19 viral infection tested positive today.  Patient also tested at home and came in today for confirmation.  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 Discussed vitamin D, vitamin C, and/or zinc supplementation with patient.     I have spent a total time of 30 minutes on the day of the encounter for this patient including discussing diagnostic results, discussing prognosis, risks and benefits of treatment options, instructions for management, patient and family education, importance of  treatment compliance, risk factor reductions, impressions, counseling/coordination of care, documenting in the medical record, reviewing/ordering tests, medicine, procedures and obtaining or reviewing history.       Encounter provider: Chente Alegria DO     [facility]  [address]  [place] [address] [phone]     Recent Visits  No visits were found meeting these conditions.  Showing recent visits within past 7 days and meeting all other requirements  Today's Visits  Date Type Provider Dept   01/19/24 Office Visit Chente Alegria DO  [facility]    Showing today's visits and meeting all other requirements  Future Appointments  No visits were found meeting these conditions.  Showing future appointments within next 150 days and meeting all other requirements     Subjective:   Meagan Quan is a 33 y.o. female who is concerned about COVID-19. Patient's symptoms include nasal congestion, rhinorrhea and cough. Patient denies fever, chills, fatigue, sore throat, shortness of breath, abdominal pain, nausea, myalgias and headaches.         COVID-19 vaccination status: Fully vaccinated (primary series)    Exposure:   Contact with a person who is under investigation (PUI) for or who is positive for COVID-19 within the last 14 days?: Yes    Lab Results   Component Value Date    SARSCOV2 Lumiradx Sars-Cov-2 AG Test 02/08/2021    SARSCOV2 Negative 02/05/2021    SARSCORONAVI Not Detected 06/27/2020    SARSCOVAG Positive (A) 01/19/2024       Review of Systems   Constitutional:  Negative for chills, fatigue and fever.   HENT:  Positive for congestion and rhinorrhea. Negative for nosebleeds, sinus pressure and sore throat.    Eyes:  Negative for discharge and redness.   Respiratory:  Positive for cough. Negative for shortness of breath.    Cardiovascular:  Negative for chest pain, palpitations and leg swelling.   Gastrointestinal:  Negative  for abdominal pain, blood in stool and nausea.   Endocrine: Negative for cold intolerance, heat intolerance and polyuria.   Genitourinary:  Negative for dysuria and frequency.   Musculoskeletal:  Negative for arthralgias, back pain and myalgias.   Skin:  Negative for rash.   Neurological:  Negative for dizziness, weakness and headaches.   Hematological:  Negative for adenopathy.   Psychiatric/Behavioral:  Negative for behavioral problems and sleep disturbance. The patient is not nervous/anxious.      Current Outpatient Medications on File Prior to Visit   Medication Sig    cholecalciferol (VITAMIN D3) 1,000 units tablet Take 1,000 Units by mouth daily    multivitamin (THERAGRAN) TABS Take 1 tablet by mouth daily       Objective:    Pulse 85   Temp (!) 96.9 °F (36.1 °C) (Tympanic)   Resp 20   SpO2 99%        Physical Exam  Vitals and nursing note reviewed.   Constitutional:       General: She is not in acute distress.     Appearance: She is well-developed.   HENT:      Head: Normocephalic and atraumatic.      Mouth/Throat:      Pharynx: Pharyngeal swelling present.   Eyes:      Conjunctiva/sclera: Conjunctivae normal.   Cardiovascular:      Rate and Rhythm: Normal rate and regular rhythm.      Heart sounds: No murmur heard.  Pulmonary:      Effort: Pulmonary effort is normal. No respiratory distress.      Breath sounds: Rhonchi present.   Abdominal:      Palpations: Abdomen is soft.      Tenderness: There is no abdominal tenderness.   Musculoskeletal:         General: No swelling.      Cervical back: Neck supple.   Skin:     General: Skin is warm and dry.      Capillary Refill: Capillary refill takes less than 2 seconds.   Neurological:      Mental Status: She is alert.   Psychiatric:         Mood and Affect: Mood normal.       Chente Alegria DO

## 2024-01-19 NOTE — ASSESSMENT & PLAN NOTE
COVID-19 viral infection tested positive today.  Patient also tested at home and came in today for confirmation.  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

## 2024-03-25 ENCOUNTER — NURSE TRIAGE (OUTPATIENT)
Age: 34
End: 2024-03-25

## 2024-03-25 NOTE — TELEPHONE ENCOUNTER
Regarding: LLQ abdominal pain  ----- Message from Daniela Florence sent at 3/25/2024  8:42 AM EDT -----  Patient calling as for the past six weeks she has been experiencing LLQ abdominal pain. States that the pain does come and go. When sitting down she does feel pressure or a feeling of a bulge. OBGYN did palpitate area but did not feel a bulge. Patient does have a follow up currently scheduled for 4/30/24.

## 2024-03-25 NOTE — TELEPHONE ENCOUNTER
Called and spoke to Pt regarding NP, Amelie S, recommendation and what the RN had recommended as well. Pt stated understanding and had no further questions/ concerns. Thank you!

## 2024-03-25 NOTE — TELEPHONE ENCOUNTER
Looks like she has an appointment with me this week.  As long as she is not having any fevers or chills I do not have any other recommendations until her appointment.

## 2024-03-25 NOTE — TELEPHONE ENCOUNTER
"Last ov 12/14/23 Dr. Millan, scheduled office visit for Friday 3/2924 to address abdominal pain, wt loss.    FYI:  Patient experiencing LLQ discomfort  \"not really pain\" describes it as feeling like something is there between \"pelvic bone and hip\". She does have history of back and SI issues. Patient has placed herself on gluten free diet and is trying to eat healthier but does admit to eating chocolate. She has been experiencing weight loss without trying. I recommended she try to go to clear liquid diet at this time to see if any improvement with symptoms. She does not have any change in bowel habits from her normal, may have some BRB on toilet paper related to hemorrhoids maybe once a year, afebrile. Patient has never had colonoscopy in past.     Additional Information   Affirmative: The patients symptoms started >3 days ago, no other symptoms    Answer Questions - Initial Assessment  When did the pain start: weeks ago    Is the pain constant or intermittent: intermittent worse with sitting    Does the pain radiate: pain radiates at times to umbilical area    Is your LLQ pain tender to touch or worsens after pressing on the abdomen: yes at times     Do you have a history of diverticulitis: No    Are you passing gas: yes very little    Have you noticed a change in your bowels: no    Does anything make the pain worse: yes worse with sitting    Any black or bloody stools: No    Do you have a fever: No or low grade fever (<100°F)    Are you able to eat and drink without difficulty: yes    What was your prior treatment for diverticulitis: N/A    Any chance of pregnancy: no    Protocols used: LLQ Pain     "

## 2024-03-26 ENCOUNTER — APPOINTMENT (EMERGENCY)
Dept: CT IMAGING | Facility: HOSPITAL | Age: 34
End: 2024-03-26
Payer: COMMERCIAL

## 2024-03-26 ENCOUNTER — HOSPITAL ENCOUNTER (EMERGENCY)
Facility: HOSPITAL | Age: 34
Discharge: HOME | End: 2024-03-26
Attending: FAMILY MEDICINE
Payer: COMMERCIAL

## 2024-03-26 VITALS
WEIGHT: 205 LBS | DIASTOLIC BLOOD PRESSURE: 78 MMHG | BODY MASS INDEX: 35 KG/M2 | TEMPERATURE: 97 F | HEART RATE: 85 BPM | SYSTOLIC BLOOD PRESSURE: 125 MMHG | OXYGEN SATURATION: 98 % | HEIGHT: 64 IN | RESPIRATION RATE: 18 BRPM

## 2024-03-26 DIAGNOSIS — R10.9 ABDOMINAL PAIN: Primary | ICD-10-CM

## 2024-03-26 LAB
ANION GAP SERPL CALCULATED.3IONS-SCNC: 9 MMOL/L (ref 4–13)
BASOPHILS # BLD AUTO: 0.03 THOUSANDS/ÂΜL (ref 0–0.1)
BASOPHILS NFR BLD AUTO: 0 % (ref 0–1)
BILIRUB UR QL STRIP: NEGATIVE
BUN SERPL-MCNC: 15 MG/DL (ref 5–25)
CALCIUM SERPL-MCNC: 9.2 MG/DL (ref 8.4–10.2)
CHLORIDE SERPL-SCNC: 102 MMOL/L (ref 96–108)
CLARITY UR: CLEAR
CO2 SERPL-SCNC: 26 MMOL/L (ref 21–32)
COLOR UR: YELLOW
CREAT SERPL-MCNC: 1.01 MG/DL (ref 0.6–1.3)
EOSINOPHIL # BLD AUTO: 0.12 THOUSAND/ÂΜL (ref 0–0.61)
EOSINOPHIL NFR BLD AUTO: 1 % (ref 0–6)
ERYTHROCYTE [DISTWIDTH] IN BLOOD BY AUTOMATED COUNT: 13.5 % (ref 11.6–15.1)
EXT PREGNANCY TEST URINE: NEGATIVE
EXT. CONTROL: NORMAL
GFR SERPL CREATININE-BSD FRML MDRD: 72 ML/MIN/1.73SQ M
GLUCOSE SERPL-MCNC: 110 MG/DL (ref 65–140)
GLUCOSE UR STRIP-MCNC: NEGATIVE MG/DL
HCT VFR BLD AUTO: 41.1 % (ref 34.8–46.1)
HGB BLD-MCNC: 13 G/DL (ref 11.5–15.4)
HGB UR QL STRIP.AUTO: NEGATIVE
IMM GRANULOCYTES # BLD AUTO: 0.03 THOUSAND/UL (ref 0–0.2)
IMM GRANULOCYTES NFR BLD AUTO: 0 % (ref 0–2)
KETONES UR STRIP-MCNC: NEGATIVE MG/DL
LEUKOCYTE ESTERASE UR QL STRIP: NEGATIVE
LYMPHOCYTES # BLD AUTO: 3.68 THOUSANDS/ÂΜL (ref 0.6–4.47)
LYMPHOCYTES NFR BLD AUTO: 31 % (ref 14–44)
MCH RBC QN AUTO: 27.5 PG (ref 26.8–34.3)
MCHC RBC AUTO-ENTMCNC: 31.6 G/DL (ref 31.4–37.4)
MCV RBC AUTO: 87 FL (ref 82–98)
MONOCYTES # BLD AUTO: 0.36 THOUSAND/ÂΜL (ref 0.17–1.22)
MONOCYTES NFR BLD AUTO: 3 % (ref 4–12)
NEUTROPHILS # BLD AUTO: 7.8 THOUSANDS/ÂΜL (ref 1.85–7.62)
NEUTS SEG NFR BLD AUTO: 65 % (ref 43–75)
NITRITE UR QL STRIP: NEGATIVE
NRBC BLD AUTO-RTO: 0 /100 WBCS
PH UR STRIP.AUTO: 6 [PH]
PLATELET # BLD AUTO: 386 THOUSANDS/UL (ref 149–390)
PMV BLD AUTO: 9.2 FL (ref 8.9–12.7)
POTASSIUM SERPL-SCNC: 3.5 MMOL/L (ref 3.5–5.3)
PROT UR STRIP-MCNC: NEGATIVE MG/DL
RBC # BLD AUTO: 4.73 MILLION/UL (ref 3.81–5.12)
SODIUM SERPL-SCNC: 137 MMOL/L (ref 135–147)
SP GR UR STRIP.AUTO: 1.01
UROBILINOGEN UR QL STRIP.AUTO: 0.2 E.U./DL
WBC # BLD AUTO: 12.02 THOUSAND/UL (ref 4.31–10.16)

## 2024-03-26 PROCEDURE — 80048 BASIC METABOLIC PNL TOTAL CA: CPT | Performed by: FAMILY MEDICINE

## 2024-03-26 PROCEDURE — 36415 COLL VENOUS BLD VENIPUNCTURE: CPT | Performed by: FAMILY MEDICINE

## 2024-03-26 PROCEDURE — 85025 COMPLETE CBC W/AUTO DIFF WBC: CPT | Performed by: FAMILY MEDICINE

## 2024-03-26 PROCEDURE — 81025 URINE PREGNANCY TEST: CPT | Performed by: FAMILY MEDICINE

## 2024-03-26 PROCEDURE — 81003 URINALYSIS AUTO W/O SCOPE: CPT | Performed by: FAMILY MEDICINE

## 2024-03-26 PROCEDURE — 74177 CT ABD & PELVIS W/CONTRAST: CPT

## 2024-03-26 PROCEDURE — 99284 EMERGENCY DEPT VISIT MOD MDM: CPT

## 2024-03-26 PROCEDURE — 99285 EMERGENCY DEPT VISIT HI MDM: CPT | Performed by: FAMILY MEDICINE

## 2024-03-26 RX ADMIN — IOHEXOL 100 ML: 350 INJECTION, SOLUTION INTRAVENOUS at 17:51

## 2024-03-26 NOTE — Clinical Note
Meagan Quan was seen and treated in our emergency department on 3/26/2024.                Diagnosis:     Meagan  .    She may return on this date: 03/28/2024         If you have any questions or concerns, please don't hesitate to call.      Gaurav Gotti MD    ______________________________           _______________          _______________  Hospital Representative                              Date                                Time

## 2024-03-26 NOTE — ED NOTES
Patient made aware of urine needed - cup placed at bedside & made aware to utilize call bell when able to provide sample.       Lea Pollock RN  03/26/24 [id number]

## 2024-03-26 NOTE — ED PROVIDER NOTES
History  Chief Complaint   Patient presents with    Abdominal Pain     LLQ abdominal pain. Pressure started 4 months ago but 4-5 days ago started with sharp pain.  Denies N/V/D/fevers.  Reports decreased appetite and a sharp stabbing pain in her pelvic area.  Patient reports left labia going in and out of numbness.  Irregular periods since January and seen gyno yesterday. Gyno ordered an ultrasound for next week       Abdominal Pain    This is a 33-year-old female presented with complaint of left lower quadrant pain ongoing for past 3 months.  States that it initially started in November then resolved and now started with the pain again for past 2 months.  Patient states she saw GI and GYN yesterday.  Patient is denying any nausea vomiting or diarrhea at this time.  Rating her pain 4 out of 10.  Patient states pain is worse recently which prompted this ED visit.  States nothing makes the pain better and sometimes walking makes it worse.  Prior to Admission Medications   Prescriptions Last Dose Informant Patient Reported? Taking?   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    E-Cigarette/Vaping    E-Cigarette Use Never User      E-Cigarette/Vaping Substances    Nicotine No     THC No     CBD No     Flavoring No     Other No     Unknown No       Social History     Tobacco Use    Smoking status: Never    Smokeless tobacco: Never   Vaping Use    Vaping status: Never Used   Substance Use Topics    Alcohol use: Yes     Comment: social    Drug use: No       Review of Systems   Gastrointestinal:  Positive for abdominal pain.       Physical Exam  Physical Exam    Vital Signs  ED Triage Vitals [03/26/24 1719]   Temperature Pulse Respirations Blood Pressure SpO2   (!) 97 °F (36.1 °C) 100 17 133/80 100 %      Temp Source Heart Rate Source Patient Position - Orthostatic VS BP Location FiO2 (%)   Temporal Monitor Sitting Left arm --      Pain Score       5           Vitals:    03/26/24 1719 03/26/24 1730   BP: 133/80 125/78   Pulse: 100 85   Patient Position - Orthostatic VS: Sitting Sitting         Visual Acuity      ED Medications  Medications   iohexol (OMNIPAQUE) 350 MG/ML injection (MULTI-DOSE) 100 mL (100 mL Intravenous Given 3/26/24 1751)       Diagnostic Studies  Results Reviewed       Procedure Component Value Units Date/Time    Basic metabolic panel [[id number]] Collected: 03/26/24 1723    Lab Status: Final result Specimen: Blood from Arm, Right Updated: 03/26/24 1744     Sodium 137 mmol/L      Potassium 3.5 mmol/L      Chloride 102 mmol/L      CO2 26 mmol/L      ANION GAP 9 mmol/L      BUN 15 mg/dL      Creatinine 1.01 mg/dL      Glucose 110 mg/dL      Calcium 9.2 mg/dL      eGFR 72 ml/min/1.73sq m     Narrative:      National Kidney Disease Foundation guidelines for Chronic Kidney Disease (CKD):     Stage 1 with normal or high GFR (GFR > 90 mL/min/1.73 square meters)    Stage 2 Mild CKD (GFR = 60-89 mL/min/1.73 square meters)    Stage 3A Moderate CKD (GFR = 45-59 mL/min/1.73 square meters)    Stage 3B Moderate CKD (GFR = 30-44 mL/min/1.73 square meters)    Stage 4 Severe CKD (GFR = 15-29 mL/min/1.73 square meters)    Stage 5 End Stage CKD (GFR <15 mL/min/1.73 square meters)  Note: GFR calculation is accurate only with a steady state creatinine    UA w  Reflex to Microscopic w Reflex to Culture [[id number]]  (Normal) Collected: 03/26/24 1734    Lab Status: Final result Specimen: Urine, Clean Catch Updated: 03/26/24 1741     Color, UA Yellow     Clarity, UA Clear     Specific Gravity, UA 1.010     pH, UA 6.0     Leukocytes, UA Negative     Nitrite, UA Negative     Protein, UA Negative mg/dl      Glucose, UA Negative mg/dl      Ketones, UA Negative mg/dl      Urobilinogen, UA 0.2 E.U./dl      Bilirubin, UA Negative     Occult Blood, UA Negative    POCT pregnancy, urine [[id number]]  (Normal) Resulted: 03/26/24 1736    Lab Status: Final result Updated: 03/26/24 1737     EXT Preg Test, Ur Negative     Control Valid    CBC and differential [[id number]]  (Abnormal) Collected: 03/26/24 1723    Lab Status: Final result Specimen: Blood from Arm, Right Updated: 03/26/24 1732     WBC 12.02 Thousand/uL      RBC 4.73 Million/uL      Hemoglobin 13.0 g/dL      Hematocrit 41.1 %      MCV 87 fL      MCH 27.5 pg      MCHC 31.6 g/dL      RDW 13.5 %      MPV 9.2 fL      Platelets 386 Thousands/uL      nRBC 0 /100 WBCs      Neutrophils Relative 65 %      Immature Grans % 0 %      Lymphocytes Relative 31 %      Monocytes Relative 3 %      Eosinophils Relative 1 %      Basophils Relative 0 %      Neutrophils Absolute 7.80 Thousands/µL      Absolute Immature Grans 0.03 Thousand/uL      Absolute Lymphocytes 3.68 Thousands/µL      Absolute Monocytes 0.36 Thousand/µL      Eosinophils Absolute 0.12 Thousand/µL      Basophils Absolute 0.03 Thousands/µL                    CT abdomen pelvis with contrast   Final Result by Hubert Byers MD (03/26 1856)      No acute inflammatory process identified.      Fatty liver.      Osteopoikilosis unchanged.         Workstation performed: [id number]                    Procedures  Procedures         ED Course  ED Course as of 03/26/24 1913   Tue Mar 26, 2024   1850 PT Is updated regarding her labs pending CT.   1908 No acute inflammatory process identified.      Fatty liver.     Osteopoikilosis unchanged.                                 SBIRT 20yo+      Flowsheet Row Most Recent Value   Initial Alcohol Screen: US AUDIT-C     1. How often do you have a drink containing alcohol? 0 Filed at: 03/26/2024 1718   2. How many drinks containing alcohol do you have on a typical day you are drinking?  0 Filed at: 03/26/2024 1718   3a. Male UNDER 65: How often do you have five or more drinks on one occasion? 0 Filed at: 03/26/2024 1718   3b. FEMALE Any Age, or MALE 65+: How often do you have 4 or more drinks on one occassion? 0 Filed at: 03/26/2024 1718   Audit-C Score 0 Filed at: 03/26/2024 1718   MORNOE: How many times in the past year have you...    Used an illegal drug or used a prescription medication for non-medical reasons? Never Filed at: 03/26/2024 1718                      Medical Decision Making  This is a 33-year-old female presented with complaint of left lower quadrant pain ongoing for past 3 months.  States that it initially started in November then resolved and now started with the pain again for past 2 months.  Patient states she saw GI and GYN yesterday.  Patient is denying any nausea vomiting or diarrhea at this time.  Rating her pain 4 out of 10.  Patient states pain is worse recently which prompted this ED visit.  States nothing makes the pain better and sometimes walking makes it worse.  History is taken from patient and mother who is at bedside  Depression diagnoses include diverticulitis/malignancy/bowel obstruction/ovarian cyst  Plan will obtain labs CBC BMP CT abdomen pelvis with contrast will also obtain UA  Lab reviewed within normal limits UA reviewed within normal limits  CT reviewed did not show any acute abnormality discussed with patient recommending following up with PCP and GI.  Some point patient may need colonoscopy recommending following up  Disposition discharge home with the strict precaution to return to the ED if symptoms not improve or worsen.   Verbalized understand the plan discharge home.    Amount and/or Complexity of Data Reviewed  Labs: ordered.  Radiology: ordered.    Risk  Prescription drug management.             Disposition  Final diagnoses:   Abdominal pain     Time reflects when diagnosis was documented in both MDM as applicable and the Disposition within this note       Time User Action Codes Description Comment    3/26/2024  6:02 PM Gaurav Gotti Add [R10.9] Abdominal pain           ED Disposition       ED Disposition   Discharge    Condition   Stable    Date/Time   Tue Mar 26, 2024 1802    Comment   Meagan Quan discharge to home/self care.                   Follow-up Information       Follow up With Specialties Details Why Contact Info    [facility], Nurse Practitioner Schedule an appointment as soon as possible for a visit in 2 days If symptoms worsen [address] [place] [address]  [phone]              Current Discharge Medication List        CONTINUE these medications which have NOT CHANGED    Details   cholecalciferol (VITAMIN D3) 1,000 units tablet Take 1,000 Units by mouth daily      multivitamin (THERAGRAN) TABS Take 1 tablet by mouth daily             No discharge procedures on file.    PDMP Review         Value Time User    PDMP Reviewed  Yes 10/18/2022  8:28 AM TAMMY Copeland            ED Provider  Electronically Signed by             Gaurav Gotti MD  03/26/24 6848

## 2024-04-04 ENCOUNTER — NURSE TRIAGE (OUTPATIENT)
Age: 34
End: 2024-04-04

## 2024-04-04 NOTE — TELEPHONE ENCOUNTER
Regarding: urgent appt  ----- Message from Laurel Edmondson sent at 4/4/2024  8:14 AM EDT -----  Patients GI provider:  Dr. Millan    Number to return call: [phone]    Reason for call: Pt calling stating she canceled her urgent appt because she could not keep it. Requesting to speak with a nurse to reschedule her urgent appt due to pain.     Scheduled procedure/appointment date if applicable: Apt/procedure

## 2024-04-04 NOTE — TELEPHONE ENCOUNTER
"Patient scheduled for ER follow up abdominal pain 4/5/24.  Patient had to cancel previous due to work.      Answer Assessment - Initial Assessment Questions  1. 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

## 2024-04-05 ENCOUNTER — PREP FOR PROCEDURE (OUTPATIENT)
Dept: GASTROENTEROLOGY | Facility: MEDICAL CENTER | Age: 34
End: 2024-04-05

## 2024-04-05 ENCOUNTER — TELEPHONE (OUTPATIENT)
Dept: GASTROENTEROLOGY | Facility: MEDICAL CENTER | Age: 34
End: 2024-04-05

## 2024-04-05 ENCOUNTER — OFFICE VISIT (OUTPATIENT)
Dept: GASTROENTEROLOGY | Facility: MEDICAL CENTER | Age: 34
End: 2024-04-05

## 2024-04-05 VITALS
WEIGHT: 201 LBS | HEIGHT: 64 IN | BODY MASS INDEX: 34.31 KG/M2 | DIASTOLIC BLOOD PRESSURE: 72 MMHG | TEMPERATURE: 98.7 F | SYSTOLIC BLOOD PRESSURE: 111 MMHG | HEART RATE: 87 BPM

## 2024-04-05 DIAGNOSIS — R19.7 DIARRHEA, UNSPECIFIED TYPE: Primary | ICD-10-CM

## 2024-04-05 DIAGNOSIS — R10.30 LOWER ABDOMINAL PAIN: ICD-10-CM

## 2024-04-05 DIAGNOSIS — R11.0 NAUSEA: ICD-10-CM

## 2024-04-05 RX ORDER — POLYETHYLENE GLYCOL 3350 17 G/17G
238 POWDER, FOR SOLUTION ORAL ONCE
Qty: 238 G | Refills: 0 | Status: SHIPPED | OUTPATIENT
Start: 2024-04-05 | End: 2024-04-05

## 2024-04-05 NOTE — PROGRESS NOTES
[facility] - Outpatient Follow-up Note  Meagan Quan 33 y.o. female MRN: [id number]  Encounter: [id number]          ASSESSMENT AND PLAN:     1.  Lower abdominal pain  2.  Diarrhea    Started approximately 3 months ago with intermittent bouts of lower abdominal pain.  Cannot pinpoint triggers.  She has been coughing for several months due to COVID and then URI and then sinus infection.  She has recently been on antibiotics.  Reports the pain as a crampy pain but cannot pinpoint triggers.  Does resolve on its own.  She also has loose stools 1-2 times per day.  Denies any melena or hematochezia.  Never had any EGD or colonoscopy.  Has lost up to 5 pounds recently.  Will rule out infectious process, inflammatory process, celiac and EPI.      -Colonoscopy with GoLytely/Dulcolax prep  -Fecal calprotectin, stool for C. difficile, Giardia, enteric pathogens, C. difficile, stool elastase, CRP and celiac panel  -Follow-up in office after test        3.  Nausea    Started several months ago with intermittent bouts of nausea.  No vomiting.  Nausea can occur with the abdominal pain that she experiences intermittently.  It also can come on its own.  Denies any heartburn or reflux.  No vomiting.  Never had any EGD.  Never tested for celiac.  No early satiety.      -Celiac panel  -EGD      I obtained informed consent from the patient. The risks/benefits/alternatives of the procedure were discussed with the patient. Risks included, but not limited to, infection, bleeding, perforation, injury to organs in the abdomen, missed lesion and incomplete procedure were discussed. Patient was agreeable and electronic signature was obtained.       ______________________________________________________________________     SUBJECTIVE: 33-year-old female here for follow-up.  She was last seen by Dr. Millan 12/14/2023 for loose stools, bloating, abdominal cramping and iron deficiency anemia.    She was recently seen  "in the ER 3/26/2024 for left lower quadrant abdominal pain that has been ongoing for approximately 3 months.  It initially started in November then resolved and now started with pain again approximately 2 months ago.  Has been seen by GYN.  Denies any nausea, vomiting or diarrhea.  Rated the pain 4/10.  CT in the ER showed no acute abdominal abnormalities.  CMP was normal, CBC was normal other than WBCs 12.02.    CT abdomen pelvis 3/26/2024 noted no acute inflammatory processes.  There was fatty liver.      Prior EGD/colonoscopy     No EGD or colonoscopy in the past    REVIEW OF SYSTEMS IS OTHERWISE NEGATIVE.  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     Past Surgical History:   Procedure Laterality Date   •  SECTION      x2     Social History   Social History     Substance and Sexual Activity   Alcohol Use Yes    Comment: social     Social History     Substance and Sexual Activity   Drug Use No     Social History     Tobacco Use   Smoking Status Never   Smokeless Tobacco Never     Family History   Problem Relation Age of Onset   • Diabetes type II Mother    • Deafness Father    • Depression Father    • Asthma Son        Meds/Allergies       Current Outpatient Medications:   •  cholecalciferol (VITAMIN D3) 1,000 units tablet  •  multivitamin (THERAGRAN) TABS  •  polyethylene glycol (MiraLax) 17 GM/SCOOP powder    Allergies   Allergen Reactions   • Bactrim [Sulfamethoxazole-Trimethoprim] Hives   • Fioricet [Butalbital-Apap-Caffeine] Itching   • Fioricet [Butalbital-Apap-Caffeine]            Objective     Blood pressure 111/72, pulse 87, temperature 98.7 °F (37.1 °C), temperature source Tympanic, height 5' 4\" (1.626 m), weight 91.2 kg (201 lb), " last menstrual period 03/01/2024. Body mass index is 34.5 kg/m².      PHYSICAL EXAM:      General Appearance:   Alert, cooperative, no distress   HEENT:   Normocephalic, atraumatic, anicteric.     Neck:  Supple, symmetrical, trachea midline   Lungs:   Clear to auscultation bilaterally; no rales, rhonchi or wheezing; respirations unlabored    Heart::   Regular rate and rhythm; no murmur, rub, or gallop.   Abdomen:   Soft, non-tender, non-distended; normal bowel sounds; no masses, no organomegaly    Genitalia:   Deferred    Rectal:   Deferred    Extremities:  No cyanosis, clubbing or edema    Pulses:  2+ and symmetric    Skin:  No jaundice, rashes, or lesions    Lymph nodes:  No palpable cervical lymphadenopathy        Lab Results:   No visits with results within 1 Day(s) from this visit.   Latest known visit with results is:   Admission on 03/26/2024, Discharged on 03/26/2024   Component Date Value   • WBC 03/26/2024 12.02 (H)    • RBC 03/26/2024 4.73    • Hemoglobin 03/26/2024 13.0    • Hematocrit 03/26/2024 41.1    • MCV 03/26/2024 87    • MCH 03/26/2024 27.5    • MCHC 03/26/2024 31.6    • RDW 03/26/2024 13.5    • MPV 03/26/2024 9.2    • Platelets 03/26/2024 386    • nRBC 03/26/2024 0    • Neutrophils Relative 03/26/2024 65    • Immature Grans % 03/26/2024 0    • Lymphocytes Relative 03/26/2024 31    • Monocytes Relative 03/26/2024 3 (L)    • Eosinophils Relative 03/26/2024 1    • Basophils Relative 03/26/2024 0    • Neutrophils Absolute 03/26/2024 7.80 (H)    • Absolute Immature Grans 03/26/2024 0.03    • Absolute Lymphocytes 03/26/2024 3.68    • Absolute Monocytes 03/26/2024 0.36    • Eosinophils Absolute 03/26/2024 0.12    • Basophils Absolute 03/26/2024 0.03    • Color, UA 03/26/2024 Yellow    • Clarity, UA 03/26/2024 Clear    • Specific Gravity, UA 03/26/2024 1.010    • pH, UA 03/26/2024 6.0    • Leukocytes, UA 03/26/2024 Negative    • Nitrite, UA 03/26/2024 Negative    • Protein, UA 03/26/2024 Negative    •  Glucose, UA 2024 Negative    • Ketones, UA 2024 Negative    • Urobilinogen, UA 2024 0.2    • Bilirubin, UA 2024 Negative    • Occult Blood, UA 2024 Negative    • Sodium 2024 137    • Potassium 2024 3.5    • Chloride 2024 102    • CO2 2024 26    • ANION GAP 2024 9    • BUN 2024 15    • Creatinine 2024 1.01    • Glucose 2024 110    • Calcium 2024 9.2    • eGFR 2024 72    • EXT Preg Test, Ur 2024 Negative    • Control 2024 Valid          Radiology Results:   US TRANSVAGINAL (OB/GYN MFM PERFORMED)    Result Date: 4/3/2024  Narrative: DIAGNOSTIC ULTRASOUND OF PELVIS, GYNECOLOGIC [facility]  [facility] [address] [place] [address] Voice: [phone] Fax: [phone] APR 3 2024 RE: Meagan Quan               Physician: MAURICE Castillo MR#: [id number]                       [facility] : DEC 17 1990                   [address] SS#: (Exam #: [id number])              [place] [address] Fax: [phone] The LMP of this 33 year old patient was unknown. A sonographic examination was performed on APR 3 2024 using real time equipment. The ultrasound examination was performed using vaginal technique. The indications for this exam were AUB & pelvic pain. INDICATIONS AUB pelvic pain CERVICAL EVALUATION The cervix appeared normal. UTERUS The uterus was well visualized, midplane & anteverted in orientation with a symmetrical shape. The size appeared normal, measuring 9.47 x 6.14 x 4.89 cm. The myometrium appeared homogeneous. The endometrium was symmetrical in shape with a double layer thickness of 1.66 cm. The endometrium lining appeared regular. CUL DE SAC FLUID A small amount of clear fluid was identified in the cul de sac. ADNEXA The left ovary appeared normal and measured 3.1 x 2.3 x 2.0 cm with a volume of 7.6 cc. The right ovary was visualized and measured  3.4 x 2.9 x 1.8 cm with a volume of 9.1 cc. Neither of the tubes was visualized. GENERAL COMMENT Please see attached note for interpretation. 2.2 x 1.7 x 2.0 cm right ovarian cyst with avascular septation. Myla Kiser R.D.M.S Electronically Signed 04/03/24 07:58    CT abdomen pelvis with contrast    Result Date: 3/26/2024  Narrative: CT ABDOMEN AND PELVIS WITH IV CONTRAST INDICATION: abdominal pain. Decreased appetite. COMPARISON: Abdominal ultrasound from 11/2/2022 and CT scan abdomen/pelvis from 12/16/2011. TECHNIQUE: CT examination of the abdomen and pelvis was performed. Multiplanar 2D reformatted images were created from the source data. This examination, like all CT scans performed in the [facility], was performed utilizing techniques to minimize radiation dose exposure, including the use of iterative reconstruction and automated exposure control. Radiation dose length product (DLP) for this visit: 848 mGy-cm IV Contrast: 100 mL of iohexol (OMNIPAQUE) Enteric Contrast: Not administered. FINDINGS: ABDOMEN LOWER CHEST: No clinically significant abnormality in the visualized lower chest. LIVER/BILIARY TREE: Hepatic steatosis. No suspicious mass. Normal hepatic contours. No biliary dilation. GALLBLADDER: No calcified gallstones. No pericholecystic inflammatory change. SPLEEN: Unremarkable. PANCREAS: Unremarkable. ADRENAL GLANDS: Unremarkable. KIDNEYS/URETERS: Unremarkable. No hydronephrosis. STOMACH AND BOWEL: Unremarkable. APPENDIX: Normal. ABDOMINOPELVIC CAVITY: No ascites. No pneumoperitoneum. No lymphadenopathy. VESSELS: Unremarkable for patient's age. PELVIS REPRODUCTIVE ORGANS: Unremarkable for patient's age. URINARY BLADDER: Unremarkable. ABDOMINAL WALL/INGUINAL REGIONS: Small fat-containing hernia without induration. BONES: No acute fracture or suspicious osseous lesion. Stable tiny sclerotic foci scattered throughout the pelvis and bilateral proximal femoral as well as the spine  consistent with osteopoikilosis.     Impression: No acute inflammatory process identified. Fatty liver. Osteopoikilosis unchanged. Workstation performed: [id number]

## 2024-04-05 NOTE — TELEPHONE ENCOUNTER
Procedure: EGD/Colonoscopy  Date: 05/10/2024  Physician performing: Dr. Hawk  Location of procedure:  [place]  Instructions given to patient: Yes  Diabetic: No  Clearances: N/A

## 2024-05-06 ENCOUNTER — LAB (OUTPATIENT)
Dept: LAB | Facility: HOSPITAL | Age: 34
End: 2024-05-06
Payer: COMMERCIAL

## 2024-05-06 DIAGNOSIS — N93.9 HEMORRHAGE IN UTERUS: ICD-10-CM

## 2024-05-06 DIAGNOSIS — R19.7 DIARRHEA, UNSPECIFIED TYPE: ICD-10-CM

## 2024-05-06 DIAGNOSIS — R19.5 LOOSE STOOLS: ICD-10-CM

## 2024-05-06 DIAGNOSIS — R14.0 BLOATING: ICD-10-CM

## 2024-05-06 DIAGNOSIS — R10.9 ABDOMINAL CRAMPING: ICD-10-CM

## 2024-05-06 DIAGNOSIS — E61.1 IRON DEFICIENCY: ICD-10-CM

## 2024-05-06 LAB
B-HCG SERPL-ACNC: <0.6 MIU/ML (ref 0–5)
CRP SERPL QL: 5.6 MG/L
FSH SERPL-ACNC: 3.6 MIU/ML
IGA SERPL-MCNC: 267 MG/DL (ref 66–433)
LH SERPL-ACNC: 3.8 MIU/ML
PROLACTIN SERPL-MCNC: 9.68 NG/ML (ref 3.34–26.72)
TSH SERPL DL<=0.05 MIU/L-ACNC: 1.3 UIU/ML (ref 0.45–4.5)

## 2024-05-06 PROCEDURE — 84146 ASSAY OF PROLACTIN: CPT

## 2024-05-06 PROCEDURE — 83001 ASSAY OF GONADOTROPIN (FSH): CPT

## 2024-05-06 PROCEDURE — 86258 DGP ANTIBODY EACH IG CLASS: CPT

## 2024-05-06 PROCEDURE — 86364 TISS TRNSGLTMNASE EA IG CLAS: CPT

## 2024-05-06 PROCEDURE — 83002 ASSAY OF GONADOTROPIN (LH): CPT

## 2024-05-06 PROCEDURE — 86140 C-REACTIVE PROTEIN: CPT

## 2024-05-06 PROCEDURE — 84702 CHORIONIC GONADOTROPIN TEST: CPT

## 2024-05-06 PROCEDURE — 84443 ASSAY THYROID STIM HORMONE: CPT

## 2024-05-06 PROCEDURE — 36415 COLL VENOUS BLD VENIPUNCTURE: CPT

## 2024-05-06 PROCEDURE — 84402 ASSAY OF FREE TESTOSTERONE: CPT

## 2024-05-06 PROCEDURE — 82784 ASSAY IGA/IGD/IGG/IGM EACH: CPT

## 2024-05-06 PROCEDURE — 84403 ASSAY OF TOTAL TESTOSTERONE: CPT

## 2024-05-07 LAB
GLIADIN PEPTIDE IGA SER-ACNC: 0.2 U/ML
GLIADIN PEPTIDE IGA SER-ACNC: NEGATIVE
GLIADIN PEPTIDE IGG SER-ACNC: <0.4 U/ML
GLIADIN PEPTIDE IGG SER-ACNC: NEGATIVE
TTG IGA SER-ACNC: <0.5 U/ML
TTG IGA SER-ACNC: NEGATIVE
TTG IGG SER-ACNC: <0.8 U/ML
TTG IGG SER-ACNC: NEGATIVE

## 2024-05-08 LAB
TESTOST FREE SERPL-MCNC: 1.2 PG/ML (ref 0–4.2)
TESTOST SERPL-MCNC: 30 NG/DL (ref 8–60)
TTG IGA SER-ACNC: <2 U/ML (ref 0–3)

## 2024-05-09 RX ORDER — LIDOCAINE HYDROCHLORIDE 10 MG/ML
0.5 INJECTION, SOLUTION EPIDURAL; INFILTRATION; INTRACAUDAL; PERINEURAL ONCE AS NEEDED
OUTPATIENT
Start: 2024-05-09

## 2024-05-09 RX ORDER — SODIUM CHLORIDE, SODIUM LACTATE, POTASSIUM CHLORIDE, CALCIUM CHLORIDE 600; 310; 30; 20 MG/100ML; MG/100ML; MG/100ML; MG/100ML
125 INJECTION, SOLUTION INTRAVENOUS CONTINUOUS
OUTPATIENT
Start: 2024-05-09

## 2024-08-02 ENCOUNTER — RA CDI HCC (OUTPATIENT)
Dept: OTHER | Facility: HOSPITAL | Age: 34
End: 2024-08-02

## 2024-09-12 ENCOUNTER — TELEMEDICINE (OUTPATIENT)
Dept: FAMILY MEDICINE CLINIC | Facility: CLINIC | Age: 34
End: 2024-09-12
Payer: COMMERCIAL

## 2024-09-12 ENCOUNTER — TELEPHONE (OUTPATIENT)
Age: 34
End: 2024-09-12

## 2024-09-12 DIAGNOSIS — U07.1 COVID-19 VIRUS INFECTION: Primary | ICD-10-CM

## 2024-09-12 PROCEDURE — 99213 OFFICE O/P EST LOW 20 MIN: CPT | Performed by: NURSE PRACTITIONER

## 2024-09-12 RX ORDER — NIRMATRELVIR AND RITONAVIR 300-100 MG
3 KIT ORAL 2 TIMES DAILY
Qty: 30 TABLET | Refills: 0 | Status: SHIPPED | OUTPATIENT
Start: 2024-09-12 | End: 2024-09-17

## 2024-09-12 NOTE — ASSESSMENT & PLAN NOTE
COVID-19 Home Care Guidelines    Your healthcare provider and/or public health staff have evaluated you and have determined that you do not need to remain in the hospital at this time.  At this time you can be isolated at home where you will be monitored by staff from your local or state health department. You should carefully follow the prevention and isolation steps below until a healthcare provider or local or state health department says that you can return to your normal activities.      Stay home except to get medical care    People who are mildly ill with COVID-19 are able to isolate at home during their illness. You should restrict activities outside your home, except for getting medical care. Do not go to work, school, or public areas. Avoid using public transportation, ride-sharing, or taxis.    Separate yourself from other people and animals in your home    People: As much as possible, you should stay in a specific room and away from other people in your home. Also, you should use a separate bathroom, if available.  Animals: You should restrict contact with pets and other animals while you are sick with COVID-19, just like you would around other people. Although there have not been reports of pets or other animals becoming sick with COVID-19, it is still recommended that people sick with COVID-19 limit contact with animals until more information is known about the virus. When possible, have another member of your household care for your animals while you are sick. If you are sick with COVID-19, avoid contact with your pet, including petting, snuggling, being kissed or licked, and sharing food. If you must care for your pet or be around animals while you are sick, wash your hands before and after you interact with pets and wear a facemask. See COVID-19 and Animals for more information.    Call ahead before visiting your doctor    If you have a medical appointment, call the healthcare provider and tell them  that you have or may have COVID-19. This will help the healthcare provider’s office take steps to keep other people from getting infected or exposed.    Wear a facemask    You should wear a facemask when you are around other people (e.g., sharing a room or vehicle) or pets and before you enter a healthcare provider’s office. If you are not able to wear a facemask (for example, because it causes trouble breathing), then people who live with you should not stay in the same room with you, or they should wear a facemask if they enter your room.    Cover your coughs and sneezes    Cover your mouth and nose with a tissue when you cough or sneeze. Throw used tissues in a lined trash can. Immediately wash your hands with soap and water for at least 20 seconds or, if soap and water are not available, clean your hands with an alcohol-based hand  that contains at least 60% alcohol.    Clean your hands often    Wash your hands often with soap and water for at least 20 seconds, especially after blowing your nose, coughing, or sneezing; going to the bathroom; and before eating or preparing food. If soap and water are not readily available, use an alcohol-based hand  with at least 60% alcohol, covering all surfaces of your hands and rubbing them together until they feel dry.  Soap and water are the best option if hands are visibly dirty. Avoid touching your eyes, nose, and mouth with unwashed hands.    Avoid sharing personal household items    You should not share dishes, drinking glasses, cups, eating utensils, towels, or bedding with other people or pets in your home. After using these items, they should be washed thoroughly with soap and water.    Clean all “high-touch” surfaces everyday    High touch surfaces include counters, tabletops, doorknobs, bathroom fixtures, toilets, phones, keyboards, tablets, and bedside tables. Also, clean any surfaces that may have blood, stool, or body fluids on them. Use a  household cleaning spray or wipe, according to the label instructions. Labels contain instructions for safe and effective use of the cleaning product including precautions you should take when applying the product, such as wearing gloves and making sure you have good ventilation during use of the product.    Monitor your symptoms    Seek prompt medical attention if your illness is worsening (e.g., difficulty breathing). Before seeking care, call your healthcare provider and tell them that you have, or are being evaluated for, COVID-19. Put on a facemask before you enter the facility. These steps will help the healthcare provider’s office to keep other people in the office or waiting room from getting infected or exposed. Ask your healthcare provider to call the local or state health department. Persons who are placed under active monitoring or facilitated self-monitoring should follow instructions provided by their local health department or occupational health professionals, as appropriate.  If you have a medical emergency and need to call 911, notify the dispatch personnel that you have, or are being evaluated for COVID-19. If possible, put on a facemask before emergency medical services arrive.    Discontinuing home isolation    Patients with confirmed COVID-19 should remain under home isolation precautions until the following conditions are met:   They have had no fever for at least 24 hours (that is one full day of no fever without the use medicine that reduces fevers)  AND  other symptoms have improved (for example, when their cough or shortness of breath have improved)  AND  If had mild or moderate illness, at least 10 days have passed since their symptoms first appeared or if severe illness (needed oxygen) or immunosuppressed, at least 20 days have passed since symptoms first appeared  Patients with confirmed COVID-19 should also notify close contacts (including their workplace) and ask that they  self-quarantine. Currently, close contact is defined as being within 6 feet for 15 minutes or more from the period 24 hours starting 48 hours before symptom onset to the time at which the patient went into isolation.  Close contacts of patients diagnosed with COVID-19 should be instructed by the patient to self-quarantine for 14 days from the last time of their last contact with the patient.     Source: https://www.cdc.gov/coronavirus/2019-ncov/hcp/guidance-prevent-spread.html

## 2024-09-12 NOTE — PROGRESS NOTES
COVID-19 Outpatient Progress Note  Name: Meagan Quan      : 1990      MRN: [id number]  Encounter Provider: TAMMY Copeland  Encounter Date: 2024   Encounter department: [facility]    Assessment & Plan  COVID-19 virus infection  COVID-19 Home Care Guidelines    Your healthcare provider and/or public health staff have evaluated you and have determined that you do not need to remain in the hospital at this time.  At this time you can be isolated at home where you will be monitored by staff from your local or state health department. You should carefully follow the prevention and isolation steps below until a healthcare provider or local or state health department says that you can return to your normal activities.      Stay home except to get medical care    People who are mildly ill with COVID-19 are able to isolate at home during their illness. You should restrict activities outside your home, except for getting medical care. Do not go to work, school, or public areas. Avoid using public transportation, ride-sharing, or taxis.    Separate yourself from other people and animals in your home    People: As much as possible, you should stay in a specific room and away from other people in your home. Also, you should use a separate bathroom, if available.  Animals: You should restrict contact with pets and other animals while you are sick with COVID-19, just like you would around other people. Although there have not been reports of pets or other animals becoming sick with COVID-19, it is still recommended that people sick with COVID-19 limit contact with animals until more information is known about the virus. When possible, have another member of your household care for your animals while you are sick. If you are sick with COVID-19, avoid contact with your pet, including petting, snuggling, being kissed or licked, and sharing food. If you must care for your pet or be  around animals while you are sick, wash your hands before and after you interact with pets and wear a facemask. See COVID-19 and Animals for more information.    Call ahead before visiting your doctor    If you have a medical appointment, call the healthcare provider and tell them that you have or may have COVID-19. This will help the healthcare provider’s office take steps to keep other people from getting infected or exposed.    Wear a facemask    You should wear a facemask when you are around other people (e.g., sharing a room or vehicle) or pets and before you enter a healthcare provider’s office. If you are not able to wear a facemask (for example, because it causes trouble breathing), then people who live with you should not stay in the same room with you, or they should wear a facemask if they enter your room.    Cover your coughs and sneezes    Cover your mouth and nose with a tissue when you cough or sneeze. Throw used tissues in a lined trash can. Immediately wash your hands with soap and water for at least 20 seconds or, if soap and water are not available, clean your hands with an alcohol-based hand  that contains at least 60% alcohol.    Clean your hands often    Wash your hands often with soap and water for at least 20 seconds, especially after blowing your nose, coughing, or sneezing; going to the bathroom; and before eating or preparing food. If soap and water are not readily available, use an alcohol-based hand  with at least 60% alcohol, covering all surfaces of your hands and rubbing them together until they feel dry.  Soap and water are the best option if hands are visibly dirty. Avoid touching your eyes, nose, and mouth with unwashed hands.    Avoid sharing personal household items    You should not share dishes, drinking glasses, cups, eating utensils, towels, or bedding with other people or pets in your home. After using these items, they should be washed thoroughly with soap  and water.    Clean all “high-touch” surfaces everyday    High touch surfaces include counters, tabletops, doorknobs, bathroom fixtures, toilets, phones, keyboards, tablets, and bedside tables. Also, clean any surfaces that may have blood, stool, or body fluids on them. Use a household cleaning spray or wipe, according to the label instructions. Labels contain instructions for safe and effective use of the cleaning product including precautions you should take when applying the product, such as wearing gloves and making sure you have good ventilation during use of the product.    Monitor your symptoms    Seek prompt medical attention if your illness is worsening (e.g., difficulty breathing). Before seeking care, call your healthcare provider and tell them that you have, or are being evaluated for, COVID-19. Put on a facemask before you enter the facility. These steps will help the healthcare provider’s office to keep other people in the office or waiting room from getting infected or exposed. Ask your healthcare provider to call the local or [facility] health department. Persons who are placed under active monitoring or facilitated self-monitoring should follow instructions provided by their local health department or occupational health professionals, as appropriate.  If you have a medical emergency and need to call 911, notify the dispatch personnel that you have, or are being evaluated for COVID-19. If possible, put on a facemask before emergency medical services arrive.    Discontinuing home isolation    Patients with confirmed COVID-19 should remain under home isolation precautions until the following conditions are met:   They have had no fever for at least 24 hours (that is one full day of no fever without the use medicine that reduces fevers)  AND  other symptoms have improved (for example, when their cough or shortness of breath have improved)  AND  If had mild or moderate illness, at least 10 days have passed  since their symptoms first appeared or if severe illness (needed oxygen) or immunosuppressed, at least 20 days have passed since symptoms first appeared  Patients with confirmed COVID-19 should also notify close contacts (including their workplace) and ask that they self-quarantine. Currently, close contact is defined as being within 6 feet for 15 minutes or more from the period 24 hours starting 48 hours before symptom onset to the time at which the patient went into isolation.  Close contacts of patients diagnosed with COVID-19 should be instructed by the patient to self-quarantine for 14 days from the last time of their last contact with the patient.     Source: https://www.cdc.gov/coronavirus/2019-ncov/hcp/guidance-prevent-spread.html          Disposition:     Discussed symptom directed medication options with patient.     I have spent a total time of 15 minutes on the day of the encounter for this patient including discussing prognosis and patient and family education.       Depression Screening and Follow-up Plan: Patient was screened for depression during today's encounter. They screened negative with a PHQ-2 score of 0.        Encounter provider: TAMMY Copeland     Provider located at: [facility]  [address] [id number] [phone]     Recent Visits  No visits were found meeting these conditions.  Showing recent visits within past 7 days and meeting all other requirements  Today's Visits  Date Type Provider Dept   09/12/24 Telemedicine TAMMY Copeland [facility]   Showing today's visits and meeting all other requirements  Future Appointments  No visits were found meeting these conditions.  Showing future appointments within next 150 days and meeting all other requirements    History of Present Illness      This virtual check-in was done via [employer] and patient was informed that this is a secure, HIPAA-compliant  platform. She agrees to proceed.    Patient agrees to participate in a virtual check in via telephone or video visit instead of presenting to the office to address urgent/immediate medical needs. Patient is aware this is a billable service. She acknowledged consent and understanding of privacy and security of the video platform. The patient has agreed to participate and understands they can discontinue the visit at any time.    After connecting through [employer], the patient was identified by name and date of birth. Meagan Quan was informed that this was a telemedicine visit and that the exam was being conducted confidentially over secure lines. My office door was closed. No one else was in the room. Meagan Quan acknowledged consent and understanding of privacy and security of the telemedicine visit. I informed the patient that I have reviewed her record in Epic and presented the opportunity for her to ask any questions regarding the visit today. The patient agreed to participate.     Verification of patient location:  Patient is located in the following state in which I hold an active license: [place]    Subjective:   Meagan Quan is a 33 y.o. female who is concerned about COVID-19. Patient denies fever, chills, fatigue, congestion, sore throat, cough, shortness of breath, chest tightness, abdominal pain, nausea, vomiting, diarrhea, myalgias and headaches.         COVID-19 vaccination status: Fully vaccinated (primary series)    Lab Results   Component Value Date    SARSCOV2 Lumiradx Sars-Cov-2 AG Test 02/08/2021    SARSCOV2 Negative 02/05/2021    SARSCORONAVI Not Detected 06/27/2020    SARSCOVAG Positive (A) 01/19/2024       Review of Systems   Constitutional:  Negative for activity change, chills, fatigue and fever.   HENT:  Negative for congestion, ear discharge, ear pain, sinus pressure, sinus pain, sore throat, tinnitus and trouble swallowing.    Eyes:  Negative for photophobia, pain, discharge,  itching and visual disturbance.   Respiratory:  Negative for cough, chest tightness, shortness of breath and wheezing.    Cardiovascular:  Negative for chest pain and leg swelling.   Gastrointestinal:  Negative for abdominal distention, abdominal pain, constipation, diarrhea, nausea and vomiting.   Endocrine: Negative for polydipsia, polyphagia and polyuria.   Genitourinary:  Negative for dysuria and frequency.   Musculoskeletal:  Negative for arthralgias, myalgias, neck pain and neck stiffness.   Skin:  Negative for color change.   Neurological:  Negative for dizziness, syncope, weakness, numbness and headaches.   Hematological:  Does not bruise/bleed easily.   Psychiatric/Behavioral:  Negative for behavioral problems, confusion, self-injury, sleep disturbance and suicidal ideas. The patient is not nervous/anxious.      Objective     There were no vitals taken for this visit.    Physical Exam  Vitals and nursing note reviewed.   Constitutional:       Appearance: Normal appearance.   HENT:      Head: Normocephalic and atraumatic.   Pulmonary:      Effort: Pulmonary effort is normal.   Neurological:      General: No focal deficit present.      Mental Status: She is alert and oriented to person, place, and time.   Psychiatric:         Mood and Affect: Mood normal.         Behavior: Behavior normal.         Thought Content: Thought content normal.

## 2024-09-12 NOTE — PROGRESS NOTES
COVID-19 Outpatient Progress Note  Name: Meagan Quan      : 1990      MRN: [id number]  Encounter Provider: TAMMY Copeland  Encounter Date: 2024   Encounter department: [facility]    Assessment & Plan  COVID-19 virus infection  COVID-19 Home Care Guidelines    Your healthcare provider and/or public health staff have evaluated you and have determined that you do not need to remain in the hospital at this time.  At this time you can be isolated at home where you will be monitored by staff from your local or state health department. You should carefully follow the prevention and isolation steps below until a healthcare provider or local or state health department says that you can return to your normal activities.      Stay home except to get medical care    People who are mildly ill with COVID-19 are able to isolate at home during their illness. You should restrict activities outside your home, except for getting medical care. Do not go to work, school, or public areas. Avoid using public transportation, ride-sharing, or taxis.    Separate yourself from other people and animals in your home    People: As much as possible, you should stay in a specific room and away from other people in your home. Also, you should use a separate bathroom, if available.  Animals: You should restrict contact with pets and other animals while you are sick with COVID-19, just like you would around other people. Although there have not been reports of pets or other animals becoming sick with COVID-19, it is still recommended that people sick with COVID-19 limit contact with animals until more information is known about the virus. When possible, have another member of your household care for your animals while you are sick. If you are sick with COVID-19, avoid contact with your pet, including petting, snuggling, being kissed or licked, and sharing food. If you must care for your pet or be  around animals while you are sick, wash your hands before and after you interact with pets and wear a facemask. See COVID-19 and Animals for more information.    Call ahead before visiting your doctor    If you have a medical appointment, call the healthcare provider and tell them that you have or may have COVID-19. This will help the healthcare provider’s office take steps to keep other people from getting infected or exposed.    Wear a facemask    You should wear a facemask when you are around other people (e.g., sharing a room or vehicle) or pets and before you enter a healthcare provider’s office. If you are not able to wear a facemask (for example, because it causes trouble breathing), then people who live with you should not stay in the same room with you, or they should wear a facemask if they enter your room.    Cover your coughs and sneezes    Cover your mouth and nose with a tissue when you cough or sneeze. Throw used tissues in a lined trash can. Immediately wash your hands with soap and water for at least 20 seconds or, if soap and water are not available, clean your hands with an alcohol-based hand  that contains at least 60% alcohol.    Clean your hands often    Wash your hands often with soap and water for at least 20 seconds, especially after blowing your nose, coughing, or sneezing; going to the bathroom; and before eating or preparing food. If soap and water are not readily available, use an alcohol-based hand  with at least 60% alcohol, covering all surfaces of your hands and rubbing them together until they feel dry.  Soap and water are the best option if hands are visibly dirty. Avoid touching your eyes, nose, and mouth with unwashed hands.    Avoid sharing personal household items    You should not share dishes, drinking glasses, cups, eating utensils, towels, or bedding with other people or pets in your home. After using these items, they should be washed thoroughly with soap  and water.    Clean all “high-touch” surfaces everyday    High touch surfaces include counters, tabletops, doorknobs, bathroom fixtures, toilets, phones, keyboards, tablets, and bedside tables. Also, clean any surfaces that may have blood, stool, or body fluids on them. Use a household cleaning spray or wipe, according to the label instructions. Labels contain instructions for safe and effective use of the cleaning product including precautions you should take when applying the product, such as wearing gloves and making sure you have good ventilation during use of the product.    Monitor your symptoms    Seek prompt medical attention if your illness is worsening (e.g., difficulty breathing). Before seeking care, call your healthcare provider and tell them that you have, or are being evaluated for, COVID-19. Put on a facemask before you enter the facility. These steps will help the healthcare provider’s office to keep other people in the office or waiting room from getting infected or exposed. Ask your healthcare provider to call the local or [facility] health department. Persons who are placed under active monitoring or facilitated self-monitoring should follow instructions provided by their local health department or occupational health professionals, as appropriate.  If you have a medical emergency and need to call 911, notify the dispatch personnel that you have, or are being evaluated for COVID-19. If possible, put on a facemask before emergency medical services arrive.    Discontinuing home isolation    Patients with confirmed COVID-19 should remain under home isolation precautions until the following conditions are met:   They have had no fever for at least 24 hours (that is one full day of no fever without the use medicine that reduces fevers)  AND  other symptoms have improved (for example, when their cough or shortness of breath have improved)  AND  If had mild or moderate illness, at least 10 days have passed  since their symptoms first appeared or if severe illness (needed oxygen) or immunosuppressed, at least 20 days have passed since symptoms first appeared  Patients with confirmed COVID-19 should also notify close contacts (including their workplace) and ask that they self-quarantine. Currently, close contact is defined as being within 6 feet for 15 minutes or more from the period 24 hours starting 48 hours before symptom onset to the time at which the patient went into isolation.  Close contacts of patients diagnosed with COVID-19 should be instructed by the patient to self-quarantine for 14 days from the last time of their last contact with the patient.     Source: https://www.cdc.gov/coronavirus/2019-ncov/hcp/guidance-prevent-spread.html   Orders:    nirmatrelvir & ritonavir (Paxlovid, 300/100,) tablet therapy pack; Take 3 tablets by mouth 2 (two) times a day for 5 days Take 2 nirmatrelvir tablets + 1 ritonavir tablet together per dose      Disposition:     Discussed vitamin D, vitamin C, and/or zinc supplementation with patient.     I have spent a total time of 15 minutes on the day of the encounter for this patient including risks and benefits of treatment options, patient and family education, importance of treatment compliance and impressions.          Encounter provider: TAMMY Copeland     Provider located at: [facility] [facility]  [address]  [place] [address] [phone]     Recent Visits  No visits were found meeting these conditions.  Showing recent visits within past 7 days and meeting all other requirements  Today's Visits  Date Type Provider Dept   09/12/24 Telemedicine TAMMY Copeland  [facility]    Showing today's visits and meeting all other requirements  Future Appointments  No visits were found meeting these conditions.  Showing future appointments within next 150 days and meeting all other requirements    History of Present Illness       This virtual check-in was done via [employer] and patient was informed that this is a secure, HIPAA-compliant platform. She agrees to proceed.    Patient agrees to participate in a virtual check in via telephone or video visit instead of presenting to the office to address urgent/immediate medical needs. Patient is aware this is a billable service. She acknowledged consent and understanding of privacy and security of the video platform. The patient has agreed to participate and understands they can discontinue the visit at any time.    After connecting through [employer], the patient was identified by name and date of birth. Meagan Quan was informed that this was a telemedicine visit and that the exam was being conducted confidentially over secure lines. My office door was closed. No one else was in the room. Meagan Quan acknowledged consent and understanding of privacy and security of the telemedicine visit. I informed the patient that I have reviewed her record in Epic and presented the opportunity for her to ask any questions regarding the visit today. The patient agreed to participate.     Verification of patient location:  Patient is located in the following state in which I hold an active license: [place]    Subjective:   Meagan Quan is a 33 y.o. female who is concerned about COVID-19. Patient's symptoms include fatigue, nasal congestion, rhinorrhea and headache. Patient denies chills, nausea and diarrhea.     - Date of symptom onset: 9/11/2024      COVID-19 vaccination status: Fully vaccinated (primary series)    Exposure:   Contact with a person who is under investigation (PUI) for or who is positive for COVID-19 within the last 14 days?: Yes    Lab Results   Component Value Date    SARSCOV2 Lumiradx Sars-Cov-2 AG Test 02/08/2021    SARSCOV2 Negative 02/05/2021    SARSCORONAVI Not Detected 06/27/2020    SARSCOVAG Positive (A) 01/19/2024       Review of Systems   Constitutional:  Positive for  activity change and fatigue. Negative for appetite change and chills.   HENT:  Positive for congestion and rhinorrhea.    Gastrointestinal:  Negative for constipation, diarrhea and nausea.   Neurological:  Positive for headaches.     Objective     There were no vitals taken for this visit.    Physical Exam  Vitals and nursing note reviewed.   Constitutional:       Appearance: She is ill-appearing.   HENT:      Head: Normocephalic and atraumatic.   Pulmonary:      Effort: Pulmonary effort is normal.   Neurological:      General: No focal deficit present.      Mental Status: She is alert and oriented to person, place, and time.   Psychiatric:         Mood and Affect: Mood normal.         Behavior: Behavior normal.         Thought Content: Thought content normal.         Judgment: Judgment normal.

## 2024-09-12 NOTE — TELEPHONE ENCOUNTER
Pt called and stated that she tested positive for covid last night and would like a work note that she can return in 5 days due to her office policy. Pt also asked for pax lovid??

## 2024-09-12 NOTE — ASSESSMENT & PLAN NOTE
COVID-19 Home Care Guidelines    Your healthcare provider and/or public health staff have evaluated you and have determined that you do not need to remain in the hospital at this time.  At this time you can be isolated at home where you will be monitored by staff from your local or state health department. You should carefully follow the prevention and isolation steps below until a healthcare provider or local or state health department says that you can return to your normal activities.      Stay home except to get medical care    People who are mildly ill with COVID-19 are able to isolate at home during their illness. You should restrict activities outside your home, except for getting medical care. Do not go to work, school, or public areas. Avoid using public transportation, ride-sharing, or taxis.    Separate yourself from other people and animals in your home    People: As much as possible, you should stay in a specific room and away from other people in your home. Also, you should use a separate bathroom, if available.  Animals: You should restrict contact with pets and other animals while you are sick with COVID-19, just like you would around other people. Although there have not been reports of pets or other animals becoming sick with COVID-19, it is still recommended that people sick with COVID-19 limit contact with animals until more information is known about the virus. When possible, have another member of your household care for your animals while you are sick. If you are sick with COVID-19, avoid contact with your pet, including petting, snuggling, being kissed or licked, and sharing food. If you must care for your pet or be around animals while you are sick, wash your hands before and after you interact with pets and wear a facemask. See COVID-19 and Animals for more information.    Call ahead before visiting your doctor    If you have a medical appointment, call the healthcare provider and tell them  that you have or may have COVID-19. This will help the healthcare provider’s office take steps to keep other people from getting infected or exposed.    Wear a facemask    You should wear a facemask when you are around other people (e.g., sharing a room or vehicle) or pets and before you enter a healthcare provider’s office. If you are not able to wear a facemask (for example, because it causes trouble breathing), then people who live with you should not stay in the same room with you, or they should wear a facemask if they enter your room.    Cover your coughs and sneezes    Cover your mouth and nose with a tissue when you cough or sneeze. Throw used tissues in a lined trash can. Immediately wash your hands with soap and water for at least 20 seconds or, if soap and water are not available, clean your hands with an alcohol-based hand  that contains at least 60% alcohol.    Clean your hands often    Wash your hands often with soap and water for at least 20 seconds, especially after blowing your nose, coughing, or sneezing; going to the bathroom; and before eating or preparing food. If soap and water are not readily available, use an alcohol-based hand  with at least 60% alcohol, covering all surfaces of your hands and rubbing them together until they feel dry.  Soap and water are the best option if hands are visibly dirty. Avoid touching your eyes, nose, and mouth with unwashed hands.    Avoid sharing personal household items    You should not share dishes, drinking glasses, cups, eating utensils, towels, or bedding with other people or pets in your home. After using these items, they should be washed thoroughly with soap and water.    Clean all “high-touch” surfaces everyday    High touch surfaces include counters, tabletops, doorknobs, bathroom fixtures, toilets, phones, keyboards, tablets, and bedside tables. Also, clean any surfaces that may have blood, stool, or body fluids on them. Use a  household cleaning spray or wipe, according to the label instructions. Labels contain instructions for safe and effective use of the cleaning product including precautions you should take when applying the product, such as wearing gloves and making sure you have good ventilation during use of the product.    Monitor your symptoms    Seek prompt medical attention if your illness is worsening (e.g., difficulty breathing). Before seeking care, call your healthcare provider and tell them that you have, or are being evaluated for, COVID-19. Put on a facemask before you enter the facility. These steps will help the healthcare provider’s office to keep other people in the office or waiting room from getting infected or exposed. Ask your healthcare provider to call the local or state health department. Persons who are placed under active monitoring or facilitated self-monitoring should follow instructions provided by their local health department or occupational health professionals, as appropriate.  If you have a medical emergency and need to call 911, notify the dispatch personnel that you have, or are being evaluated for COVID-19. If possible, put on a facemask before emergency medical services arrive.    Discontinuing home isolation    Patients with confirmed COVID-19 should remain under home isolation precautions until the following conditions are met:   They have had no fever for at least 24 hours (that is one full day of no fever without the use medicine that reduces fevers)  AND  other symptoms have improved (for example, when their cough or shortness of breath have improved)  AND  If had mild or moderate illness, at least 10 days have passed since their symptoms first appeared or if severe illness (needed oxygen) or immunosuppressed, at least 20 days have passed since symptoms first appeared  Patients with confirmed COVID-19 should also notify close contacts (including their workplace) and ask that they  self-quarantine. Currently, close contact is defined as being within 6 feet for 15 minutes or more from the period 24 hours starting 48 hours before symptom onset to the time at which the patient went into isolation.  Close contacts of patients diagnosed with COVID-19 should be instructed by the patient to self-quarantine for 14 days from the last time of their last contact with the patient.     Source: https://www.cdc.gov/coronavirus/2019-ncov/hcp/guidance-prevent-spread.html   Orders:    nirmatrelvir & ritonavir (Paxlovid, 300/100,) tablet therapy pack; Take 3 tablets by mouth 2 (two) times a day for 5 days Take 2 nirmatrelvir tablets + 1 ritonavir tablet together per dose

## 2024-09-12 NOTE — LETTER
September 12, 2024    Patient: Meagan Quan  YOB: 1990  Date of Last Encounter: Visit date not found      To whom it may concern:     Meagan Quan has tested positive for COVID-19 (Coronavirus) on 9/11/24. She may return to work on 9/17/24, which is 5 days from illness onset (provided symptoms are improving) and 24 hours without fever.    Sincerely,         TAMMY Copeland

## 2024-11-19 ENCOUNTER — OFFICE VISIT (OUTPATIENT)
Dept: FAMILY MEDICINE CLINIC | Facility: CLINIC | Age: 34
End: 2024-11-19

## 2024-11-19 VITALS
TEMPERATURE: 97 F | HEART RATE: 87 BPM | SYSTOLIC BLOOD PRESSURE: 120 MMHG | RESPIRATION RATE: 18 BRPM | BODY MASS INDEX: 36.5 KG/M2 | WEIGHT: 213.8 LBS | HEIGHT: 64 IN | DIASTOLIC BLOOD PRESSURE: 70 MMHG

## 2024-11-19 DIAGNOSIS — Z00.00 ENCOUNTER FOR WELL ADULT EXAM WITHOUT ABNORMAL FINDINGS: Primary | ICD-10-CM

## 2024-11-19 DIAGNOSIS — E66.813 OBESITY, CLASS 3: ICD-10-CM

## 2024-11-19 PROCEDURE — 99395 PREV VISIT EST AGE 18-39: CPT | Performed by: NURSE PRACTITIONER

## 2024-11-19 NOTE — PROGRESS NOTES
Adult Annual Physical  Name: Meagan Quan      : 1990      MRN: [id number]  Encounter Provider: Radha Ferraro DNP  Encounter Date: 2024   Encounter department: [facility]    Assessment & Plan  Encounter for well adult exam without abnormal findings    Orders:    CBC and differential; Future    Comprehensive metabolic panel; Future    Lipid Panel with Direct LDL reflex; Future    Obesity, class 3  Appt in march   Orders:    TSH, 3rd generation with Free T4 reflex; Future    Hemoglobin A1C; Future    Immunizations and preventive care screenings were discussed with patient today. Appropriate education was printed on patient's after visit summary.    Counseling:  Alcohol/drug use: discussed moderation in alcohol intake, the recommendations for healthy alcohol use, and avoidance of illicit drug use.  Dental Health: discussed importance of regular tooth brushing, flossing, and dental visits.  Injury prevention: discussed safety/seat belts, safety helmets, smoke detectors, carbon monoxide detectors, and smoking near bedding or upholstery.  Sexual health: discussed sexually transmitted diseases, partner selection, use of condoms, avoidance of unintended pregnancy, and contraceptive alternatives.  Exercise: the importance of regular exercise/physical activity was discussed. Recommend exercise 3-5 times per week for at least 30 minutes.          History of Present Illness     Adult Annual Physical:  Patient presents for annual physical.     Diet and Physical Activity:  - Diet/Nutrition: well balanced diet.  - Exercise: no formal exercise.    General Health:  - Sleep: 4-6 hours of sleep on average.  - Hearing: normal hearing right ear and decreased hearing left ear.  - Vision: vision problems and goes for regular eye exams.  - Dental: no dental visits for > 1 year.    /GYN Health:  - Follows with GYN: yes.   - Menopause: perimenopausal.   - Contraception:. tubaligation   "    Advanced Care Planning:  - Has an advanced directive?: no    - Has a durable medical POA?: no    - ACP document given to patient?: no      Review of Systems   Constitutional:  Negative for activity change, chills, fatigue and fever.   HENT:  Negative for congestion, ear discharge, ear pain, sinus pressure, sinus pain, sore throat, tinnitus and trouble swallowing.    Eyes:  Negative for photophobia, pain, discharge, itching and visual disturbance.   Respiratory:  Negative for cough, chest tightness, shortness of breath and wheezing.    Cardiovascular:  Negative for chest pain and leg swelling.   Gastrointestinal:  Negative for abdominal distention, abdominal pain, constipation, diarrhea, nausea and vomiting.   Endocrine: Negative for polydipsia, polyphagia and polyuria.   Genitourinary:  Negative for dysuria and frequency.   Musculoskeletal:  Positive for back pain. Negative for arthralgias, myalgias, neck pain and neck stiffness.   Skin:  Negative for color change.   Neurological:  Negative for dizziness, syncope, weakness, numbness and headaches.   Hematological:  Does not bruise/bleed easily.   Psychiatric/Behavioral:  Negative for behavioral problems, confusion, self-injury, sleep disturbance and suicidal ideas. The patient is not nervous/anxious.          Objective   /70 (BP Location: Left arm, Patient Position: Sitting, Cuff Size: Standard)   Pulse 87   Temp (!) 97 °F (36.1 °C) (Temporal)   Resp 18   Ht 5' 4\" (1.626 m)   Wt 97 kg (213 lb 12.8 oz)   BMI 36.70 kg/m²     Physical Exam  Vitals and nursing note reviewed.   Constitutional:       Appearance: She is obese.   HENT:      Head: Normocephalic and atraumatic.      Right Ear: Tympanic membrane, ear canal and external ear normal.      Left Ear: Tympanic membrane, ear canal and external ear normal.      Nose: Nose normal.      Mouth/Throat:      Mouth: Mucous membranes are moist.   Eyes:      Extraocular Movements: Extraocular movements intact. "      Conjunctiva/sclera: Conjunctivae normal.      Pupils: Pupils are equal, round, and reactive to light.   Cardiovascular:      Rate and Rhythm: Normal rate and regular rhythm.      Pulses: Normal pulses.      Heart sounds: Normal heart sounds.   Pulmonary:      Effort: Pulmonary effort is normal.      Breath sounds: Normal breath sounds.   Musculoskeletal:         General: Normal range of motion.      Cervical back: Normal range of motion.   Skin:     General: Skin is warm.   Neurological:      General: No focal deficit present.      Mental Status: She is alert and oriented to person, place, and time.   Psychiatric:         Mood and Affect: Mood normal.         Behavior: Behavior normal.         Thought Content: Thought content normal.         Judgment: Judgment normal.

## 2025-04-07 DIAGNOSIS — R10.13 EPIGASTRIC PAIN: Primary | ICD-10-CM

## 2025-04-28 ENCOUNTER — RESULTS FOLLOW-UP (OUTPATIENT)
Dept: FAMILY MEDICINE CLINIC | Facility: CLINIC | Age: 35
End: 2025-04-28

## 2025-04-28 ENCOUNTER — RESULTS FOLLOW-UP (OUTPATIENT)
Dept: GASTROENTEROLOGY | Facility: MEDICAL CENTER | Age: 35
End: 2025-04-28

## 2025-04-28 ENCOUNTER — HOSPITAL ENCOUNTER (OUTPATIENT)
Dept: NUCLEAR MEDICINE | Facility: HOSPITAL | Age: 35
Discharge: HOME | End: 2025-04-28
Attending: NURSE PRACTITIONER
Payer: COMMERCIAL

## 2025-04-28 ENCOUNTER — APPOINTMENT (OUTPATIENT)
Dept: LAB | Facility: HOSPITAL | Age: 35
End: 2025-04-28
Payer: COMMERCIAL

## 2025-04-28 DIAGNOSIS — R10.13 EPIGASTRIC PAIN: ICD-10-CM

## 2025-04-28 DIAGNOSIS — D75.839 THROMBOCYTHEMIA: Primary | ICD-10-CM

## 2025-04-28 DIAGNOSIS — E66.813 OBESITY, CLASS 3: ICD-10-CM

## 2025-04-28 DIAGNOSIS — Z00.00 ENCOUNTER FOR WELL ADULT EXAM WITHOUT ABNORMAL FINDINGS: ICD-10-CM

## 2025-04-28 LAB
ALBUMIN SERPL BCG-MCNC: 4.5 G/DL (ref 3.5–5)
ALP SERPL-CCNC: 67 U/L (ref 34–104)
ALT SERPL W P-5'-P-CCNC: 13 U/L (ref 7–52)
ANION GAP SERPL CALCULATED.3IONS-SCNC: 8 MMOL/L (ref 4–13)
AST SERPL W P-5'-P-CCNC: 13 U/L (ref 13–39)
BASOPHILS # BLD AUTO: 0.04 THOUSANDS/ÂΜL (ref 0–0.1)
BASOPHILS NFR BLD AUTO: 0 % (ref 0–1)
BILIRUB SERPL-MCNC: 0.81 MG/DL (ref 0.2–1)
BUN SERPL-MCNC: 12 MG/DL (ref 5–25)
CALCIUM SERPL-MCNC: 9.4 MG/DL (ref 8.4–10.2)
CHLORIDE SERPL-SCNC: 104 MMOL/L (ref 96–108)
CHOLEST SERPL-MCNC: 167 MG/DL (ref ?–200)
CO2 SERPL-SCNC: 27 MMOL/L (ref 21–32)
CREAT SERPL-MCNC: 0.64 MG/DL (ref 0.6–1.3)
EOSINOPHIL # BLD AUTO: 0.09 THOUSAND/ÂΜL (ref 0–0.61)
EOSINOPHIL NFR BLD AUTO: 1 % (ref 0–6)
ERYTHROCYTE [DISTWIDTH] IN BLOOD BY AUTOMATED COUNT: 13.6 % (ref 11.6–15.1)
GFR SERPL CREATININE-BSD FRML MDRD: 115 ML/MIN/1.73SQ M
GLUCOSE P FAST SERPL-MCNC: 92 MG/DL (ref 65–99)
HCT VFR BLD AUTO: 46 % (ref 34.8–46.1)
HDLC SERPL-MCNC: 76 MG/DL
HGB BLD-MCNC: 14.5 G/DL (ref 11.5–15.4)
IMM GRANULOCYTES # BLD AUTO: 0.03 THOUSAND/UL (ref 0–0.2)
IMM GRANULOCYTES NFR BLD AUTO: 0 % (ref 0–2)
LDLC SERPL CALC-MCNC: 71 MG/DL (ref 0–100)
LYMPHOCYTES # BLD AUTO: 3.31 THOUSANDS/ÂΜL (ref 0.6–4.47)
LYMPHOCYTES NFR BLD AUTO: 34 % (ref 14–44)
MCH RBC QN AUTO: 27.9 PG (ref 26.8–34.3)
MCHC RBC AUTO-ENTMCNC: 31.5 G/DL (ref 31.4–37.4)
MCV RBC AUTO: 89 FL (ref 82–98)
MONOCYTES # BLD AUTO: 0.4 THOUSAND/ÂΜL (ref 0.17–1.22)
MONOCYTES NFR BLD AUTO: 4 % (ref 4–12)
NEUTROPHILS # BLD AUTO: 5.87 THOUSANDS/ÂΜL (ref 1.85–7.62)
NEUTS SEG NFR BLD AUTO: 61 % (ref 43–75)
NRBC BLD AUTO-RTO: 0 /100 WBCS
PLATELET # BLD AUTO: 415 THOUSANDS/UL (ref 149–390)
PMV BLD AUTO: 8.6 FL (ref 8.9–12.7)
POTASSIUM SERPL-SCNC: 4.2 MMOL/L (ref 3.5–5.3)
PROT SERPL-MCNC: 7.9 G/DL (ref 6.4–8.4)
RBC # BLD AUTO: 5.19 MILLION/UL (ref 3.81–5.12)
SODIUM SERPL-SCNC: 139 MMOL/L (ref 135–147)
TRIGL SERPL-MCNC: 98 MG/DL (ref ?–150)
TSH SERPL DL<=0.05 MIU/L-ACNC: 1.72 UIU/ML (ref 0.45–4.5)
WBC # BLD AUTO: 9.74 THOUSAND/UL (ref 4.31–10.16)

## 2025-04-28 PROCEDURE — 80061 LIPID PANEL: CPT

## 2025-04-28 PROCEDURE — 83036 HEMOGLOBIN GLYCOSYLATED A1C: CPT

## 2025-04-28 PROCEDURE — 80053 COMPREHEN METABOLIC PANEL: CPT

## 2025-04-28 PROCEDURE — 36415 COLL VENOUS BLD VENIPUNCTURE: CPT

## 2025-04-28 PROCEDURE — 85025 COMPLETE CBC W/AUTO DIFF WBC: CPT

## 2025-04-28 PROCEDURE — A9537 TC99M MEBROFENIN: HCPCS

## 2025-04-28 PROCEDURE — 84443 ASSAY THYROID STIM HORMONE: CPT

## 2025-04-28 PROCEDURE — 78227 HEPATOBIL SYST IMAGE W/DRUG: CPT

## 2025-04-28 RX ORDER — SINCALIDE 5 UG/5ML
0.02 INJECTION, POWDER, LYOPHILIZED, FOR SOLUTION INTRAVENOUS
Status: COMPLETED | OUTPATIENT
Start: 2025-04-28 | End: 2025-04-28

## 2025-04-28 RX ADMIN — SINCALIDE 1.9 MCG: 5 INJECTION, POWDER, LYOPHILIZED, FOR SOLUTION INTRAVENOUS at 13:36

## 2025-04-29 LAB
EST. AVERAGE GLUCOSE BLD GHB EST-MCNC: 100 MG/DL
FERRITIN SERPL-MCNC: 21 NG/ML (ref 30–307)
HBA1C MFR BLD: 5.1 %
IRON SATN MFR SERPL: 17 % (ref 15–50)
IRON SERPL-MCNC: 66 UG/DL (ref 50–212)
TIBC SERPL-MCNC: 378 UG/DL (ref 250–450)
TRANSFERRIN SERPL-MCNC: 270 MG/DL (ref 203–362)
UIBC SERPL-MCNC: 312 UG/DL (ref 155–355)

## 2025-04-29 NOTE — RESULT ENCOUNTER NOTE
Please let her know I reviewed her labs, platelet count was mildly elevated as this can stem from various reason. Would advise repeat labs over the summer along with in office appt   TSH, lipid panel, CMP panel all normal

## 2025-06-03 ENCOUNTER — HOSPITAL ENCOUNTER (EMERGENCY)
Facility: HOSPITAL | Age: 35
Discharge: HOME | End: 2025-06-03
Attending: EMERGENCY MEDICINE | Admitting: EMERGENCY MEDICINE
Payer: COMMERCIAL

## 2025-06-03 VITALS
OXYGEN SATURATION: 100 % | SYSTOLIC BLOOD PRESSURE: 131 MMHG | RESPIRATION RATE: 17 BRPM | TEMPERATURE: 98.6 F | HEART RATE: 90 BPM | DIASTOLIC BLOOD PRESSURE: 95 MMHG

## 2025-06-03 DIAGNOSIS — S16.1XXA STRAIN OF NECK MUSCLE, INITIAL ENCOUNTER: ICD-10-CM

## 2025-06-03 DIAGNOSIS — S80.02XA CONTUSION OF LEFT KNEE, INITIAL ENCOUNTER: ICD-10-CM

## 2025-06-03 DIAGNOSIS — S39.012A STRAIN OF LUMBAR REGION, INITIAL ENCOUNTER: ICD-10-CM
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
## 2025-06-03 PROCEDURE — 99284 EMERGENCY DEPT VISIT MOD MDM: CPT

## 2025-06-03 PROCEDURE — 99284 EMERGENCY DEPT VISIT MOD MDM: CPT | Performed by: EMERGENCY MEDICINE
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
## 2025-06-03 NOTE — Clinical Note
Meagan Quan was seen and treated in our emergency department on 6/3/2025.                Diagnosis:     Meagan  may return to work on return date.    She may return on this date: 06/05/2025    Patient seen in the emergency department on 6/3/2025.  Please excuse from work tomorrow 6/4/2025.     If you have any questions or concerns, please don't hesitate to call.      Reilly An Jr., DO    ______________________________           _______________          _______________  Hospital Representative                              Date                                Time

## 2025-06-03 NOTE — DISCHARGE INSTRUCTIONS
Return to the ER for any new, concerning, or worsening issues, such as worsening headache vomiting passing out chest pain shortness of breath abdominal pain or any vision, numbness or weakness.    Use ice to sore areas 4-6 times a day for 10 to 15 minutes at a time.    After 48 hours you may switch to warm moist heat for the same type of duration.    Use Motrin as needed every 6 hours for discomfort.    Clean left leg abrasion with some soap and water and antibiotic ointment and return to the ER for signs of any infection such as redness swelling pus or red streaking.    Recheck with your family doctor in 3 to 5 days for repeat evaluation.

## 2025-06-05 ENCOUNTER — APPOINTMENT (OUTPATIENT)
Dept: RADIOLOGY | Facility: CLINIC | Age: 35
End: 2025-06-05
Payer: COMMERCIAL

## 2025-06-05 ENCOUNTER — OFFICE VISIT (OUTPATIENT)
Dept: URGENT CARE | Facility: CLINIC | Age: 35
End: 2025-06-05
Payer: COMMERCIAL

## 2025-06-05 VITALS
SYSTOLIC BLOOD PRESSURE: 112 MMHG | HEART RATE: 77 BPM | OXYGEN SATURATION: 99 % | DIASTOLIC BLOOD PRESSURE: 72 MMHG | TEMPERATURE: 97.7 F | RESPIRATION RATE: 18 BRPM

## 2025-06-05 DIAGNOSIS — S59.911A INJURY OF RIGHT FOREARM, INITIAL ENCOUNTER: ICD-10-CM

## 2025-06-05 DIAGNOSIS — S60.011A CONTUSION OF RIGHT THUMB WITHOUT DAMAGE TO NAIL, INITIAL ENCOUNTER: ICD-10-CM

## 2025-06-05 DIAGNOSIS — S50.11XA CONTUSION OF RIGHT FOREARM, INITIAL ENCOUNTER: Primary | ICD-10-CM

## 2025-06-05 DIAGNOSIS — S39.012A STRAIN OF LUMBAR REGION, INITIAL ENCOUNTER: ICD-10-CM
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
## 2025-06-05 DIAGNOSIS — S06.0X0A CONCUSSION WITHOUT LOSS OF CONSCIOUSNESS, INITIAL ENCOUNTER: ICD-10-CM

## 2025-06-05 DIAGNOSIS — S69.91XA HAND INJURY, RIGHT, INITIAL ENCOUNTER: ICD-10-CM

## 2025-06-05 DIAGNOSIS — S13.9XXA NECK SPRAIN, INITIAL ENCOUNTER: ICD-10-CM

## 2025-06-05 DIAGNOSIS — S60.222A CONTUSION OF DORSUM OF LEFT HAND: ICD-10-CM

## 2025-06-05 PROCEDURE — 73090 X-RAY EXAM OF FOREARM: CPT

## 2025-06-05 PROCEDURE — G0384 LEV 5 HOSP TYPE B ED VISIT: HCPCS | Performed by: PHYSICIAN ASSISTANT

## 2025-06-05 PROCEDURE — 73130 X-RAY EXAM OF HAND: CPT

## 2025-06-05 RX ORDER — BACLOFEN 10 MG/1
10 TABLET ORAL 3 TIMES DAILY
Qty: 15 TABLET | Refills: 0 | Status: SHIPPED | OUTPATIENT
Start: 2025-06-05
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
## 2025-06-06 ENCOUNTER — RESULTS FOLLOW-UP (OUTPATIENT)
Dept: URGENT CARE | Facility: CLINIC | Age: 35
End: 2025-06-06

## 2025-06-09 ENCOUNTER — APPOINTMENT (OUTPATIENT)
Dept: RADIOLOGY | Facility: MEDICAL CENTER | Age: 35
End: 2025-06-09
Attending: EMERGENCY MEDICINE
Payer: COMMERCIAL

## 2025-06-09 ENCOUNTER — OFFICE VISIT (OUTPATIENT)
Dept: OBGYN CLINIC | Facility: MEDICAL CENTER | Age: 35
End: 2025-06-09
Payer: COMMERCIAL

## 2025-06-09 VITALS — HEIGHT: 64 IN | BODY MASS INDEX: 35.85 KG/M2 | WEIGHT: 210 LBS
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
## 2025-06-09 DIAGNOSIS — M54.2 NECK PAIN: ICD-10-CM

## 2025-06-09 DIAGNOSIS — M54.41 ACUTE BILATERAL LOW BACK PAIN WITH RIGHT-SIDED SCIATICA: Primary | ICD-10-CM

## 2025-06-09 DIAGNOSIS — M54.41 BILATERAL LOW BACK PAIN WITH RIGHT-SIDED SCIATICA, UNSPECIFIED CHRONICITY: ICD-10-CM

## 2025-06-09 DIAGNOSIS — M79.641 HAND PAIN, RIGHT: ICD-10-CM

## 2025-06-09 PROCEDURE — 72040 X-RAY EXAM NECK SPINE 2-3 VW: CPT

## 2025-06-09 PROCEDURE — 72100 X-RAY EXAM L-S SPINE 2/3 VWS: CPT

## 2025-06-09 PROCEDURE — 99204 OFFICE O/P NEW MOD 45 MIN: CPT | Performed by: EMERGENCY MEDICINE

## 2025-06-09 RX ORDER — MOMETASONE FUROATE 1 MG/ML
SOLUTION TOPICAL
COMMUNITY
Start: 2025-05-29
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
## 2025-06-09 NOTE — ASSESSMENT & PLAN NOTE
Obtained and reviewed Xrays L spine  Will continue Advil and Baclofen, add Tylenol  Declines Gabapentin and Oral Steroids  Hx Right SIJ CSI with Pain management  Orders:    XR spine lumbar 2 or 3 views injury; Future    Ambulatory Referral to Physical Therapy; Future

## 2025-06-09 NOTE — ASSESSMENT & PLAN NOTE
Orders:    Ambulatory Referral to Orthopedic Surgery; Future    Ambulatory Referral to Physical Therapy; Future    Cock Up Wrist Splint

## 2025-06-10 ENCOUNTER — TELEPHONE (OUTPATIENT)
Age: 35
End: 2025-06-10
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
## 2025-06-11 DIAGNOSIS — H91.93 DECREASED HEARING OF BOTH EARS: Primary | ICD-10-CM

## 2025-06-12 ENCOUNTER — OFFICE VISIT (OUTPATIENT)
Dept: OBGYN CLINIC | Facility: MEDICAL CENTER | Age: 35
End: 2025-06-12
Payer: COMMERCIAL

## 2025-06-12 VITALS — HEIGHT: 64 IN | BODY MASS INDEX: 35.68 KG/M2 | WEIGHT: 209 LBS

## 2025-06-12 DIAGNOSIS — M25.531 PAIN IN RIGHT WRIST: Primary | ICD-10-CM

## 2025-06-12 DIAGNOSIS — M25.521 PAIN IN RIGHT ELBOW: ICD-10-CM

## 2025-06-12 PROCEDURE — 99214 OFFICE O/P EST MOD 30 MIN: CPT | Performed by: ORTHOPAEDIC SURGERY

## 2025-06-12 NOTE — LETTER
June 12, 2025     Patient: Meagan Quan  YOB: 1990  Date of Visit: 6/12/2025      To Whom it May Concern:    Meagan Quan is under my professional care. Meagan was seen in my office on 6/12/2025. Please allow Meagan to wear a right wrist brace while working.     If you have any questions or concerns, please don't hesitate to call.          Sincerely,          Jack Santiago MD        CC: No Recipients
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
## 2025-06-16 ENCOUNTER — HOSPITAL ENCOUNTER (OUTPATIENT)
Dept: MRI IMAGING | Facility: HOSPITAL | Age: 35
Discharge: HOME | End: 2025-06-16
Payer: COMMERCIAL

## 2025-06-16 ENCOUNTER — TELEPHONE (OUTPATIENT)
Age: 35
End: 2025-06-16

## 2025-06-16 DIAGNOSIS — M25.531 PAIN IN RIGHT WRIST: ICD-10-CM

## 2025-06-16 PROCEDURE — 73221 MRI JOINT UPR EXTREM W/O DYE: CPT

## 2025-06-17 ENCOUNTER — RESULTS FOLLOW-UP (OUTPATIENT)
Age: 35
End: 2025-06-17

## 2025-06-18 ENCOUNTER — EVALUATION (OUTPATIENT)
Dept: PHYSICAL THERAPY | Age: 35
End: 2025-06-18
Payer: COMMERCIAL

## 2025-06-18 DIAGNOSIS — M54.50 LUMBAR PAIN WITH RADIATION DOWN RIGHT LEG: Primary | ICD-10-CM

## 2025-06-18 DIAGNOSIS — M79.604 LUMBAR PAIN WITH RADIATION DOWN RIGHT LEG: Primary | ICD-10-CM

## 2025-06-18 PROCEDURE — 97530 THERAPEUTIC ACTIVITIES: CPT

## 2025-06-18 PROCEDURE — 97162 PT EVAL MOD COMPLEX 30 MIN: CPT

## 2025-06-18 PROCEDURE — 97110 THERAPEUTIC EXERCISES: CPT
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
## 2025-06-23 ENCOUNTER — APPOINTMENT (OUTPATIENT)
Dept: PHYSICAL THERAPY | Age: 35
End: 2025-06-23
Attending: EMERGENCY MEDICINE
Payer: COMMERCIAL

## 2025-06-25 ENCOUNTER — APPOINTMENT (OUTPATIENT)
Dept: PHYSICAL THERAPY | Age: 35
End: 2025-06-25
Attending: EMERGENCY MEDICINE
Payer: COMMERCIAL

## 2025-06-25 ENCOUNTER — OFFICE VISIT (OUTPATIENT)
Dept: OBGYN CLINIC | Facility: MEDICAL CENTER | Age: 35
End: 2025-06-25
Payer: COMMERCIAL

## 2025-06-25 VITALS — HEIGHT: 64 IN | WEIGHT: 211 LBS | BODY MASS INDEX: 36.02 KG/M2

## 2025-06-25 DIAGNOSIS — R20.0 NUMBNESS AND TINGLING IN RIGHT HAND: ICD-10-CM

## 2025-06-25 DIAGNOSIS — R20.2 NUMBNESS AND TINGLING IN RIGHT HAND: ICD-10-CM

## 2025-06-25 DIAGNOSIS — M77.01 MEDIAL EPICONDYLITIS, RIGHT: Primary | ICD-10-CM

## 2025-06-25 DIAGNOSIS — M77.8 RIGHT WRIST TENDONITIS: ICD-10-CM

## 2025-06-25 PROCEDURE — 99213 OFFICE O/P EST LOW 20 MIN: CPT | Performed by: ORTHOPAEDIC SURGERY

## 2025-06-25 RX ORDER — MELOXICAM 15 MG/1
15 TABLET ORAL DAILY
Qty: 14 TABLET | Refills: 1 | Status: SHIPPED | OUTPATIENT
Start: 2025-06-25 | End: 2025-07-23

## 2025-06-25 NOTE — PROGRESS NOTES
"HAND & UPPER EXTREMITY OFFICE VISIT   Referred By:  Radha Ferraro, Kaylee  [address] [place] [address]      Chief Complaint:     Right wrist and elbow pain  DOI 6/3/25    Previous History:   Previously seen on 6/12. At that point MRI was ordered to evaluate for an occult scaphoid fracture.     Interval History:  Since the last visit she reports increased pain throughout the right hand, wrist, and elbow pain. She has difficulty performing ADLs and leaning on the right elbow due to the pain.     Past Medical History:  Past Medical History[1]  Past Surgical History[2]  Family History[3]  Social History     Socioeconomic History   • Marital status: Single     Spouse name: Not on file   • Number of children: Not on file   • Years of education: Not on file   • Highest education level: Not on file   Occupational History   • Not on file   Tobacco Use   • Smoking status: Never   • Smokeless tobacco: Never   Vaping Use   • Vaping status: Never Used   Substance and Sexual Activity   • Alcohol use: Yes     Comment: social   • Drug use: No   • Sexual activity: Not on file   Other Topics Concern   • Not on file   Social History Narrative    ** Merged History Encounter **          Social Drivers of Health     Financial Resource Strain: Not on file   Food Insecurity: Not on file   Transportation Needs: Not on file   Physical Activity: Not on file   Stress: Not on file   Social Connections: Unknown (6/18/2024)    Received from [employer]    • How often do you feel lonely or isolated from those around you? (Adult - for ages 18 years and over): Not on file   Intimate Partner Violence: Not on file   Housing Stability: Not on file     Scheduled Meds:  Continuous Infusions:No current facility-administered medications for this visit.    PRN Meds:.  Allergies[4]    Physical Examination:    Ht 5' 4\" (1.626 m)   Wt 95.7 kg (211 lb)   BMI 36.22 kg/m²     Gen: A&Ox3, NAD  Cardiac: regular rate  Chest: non " labored breathing  Abdomen: Non-distended      Right Upper Extremity:  Skin CDI  No obvious deformity of the shoulder, arm, elbow, forearm, wrist, hand  TTP medial epicondyle, 4th dorsal compartment, snuffbox  Sensation intact to light touch in the axillary median, ulnar, and radial nerve distributions  Limited wrist extension due to pain  Pain at medial elbow with resisted wrist flexion  Warm, well-perfused digits  Cap refill <2s  Positive Tinel's at the cubital tunnel       Studies:  I personally reviewed and independently interpreted the available studies.  6/16/25: MRI of the right wrist demonstrates a normal study, specifically, no evidence of a scaphoid fracture.       Labs:  I personally reviewed the following labs,  Lab Results   Component Value Date    HGBA1C 5.1 04/28/2025    HGBA1C 5.3 09/02/2023         Assessment & Plan  Medial epicondylitis, right  Numbness and tingling in right hand  Right wrist tendonitis  Her symptoms are consistent with tendonitis of multiple sites throughout the upper extremity. We discussed that she likely has acute medial epicondylitis which is also causing some irritation of the ulnar nerve at the cubital tunnel. I do not believe CSI would be very beneficial given her multiple sites of tenderness. Offered oral medrol dosepak but declined due to side effects. Prescription provided for once daily mobic. Referral also provided for PT. It is recommended she return to the office in 4 weeks for repeat evaluation.   Orders:  •  Ambulatory Referral to PT/OT Hand Therapy; Future  •  meloxicam (Mobic) 15 mg tablet; Take 1 tablet (15 mg total) by mouth daily for 28 days    she expressed understanding of the plan and agreed. We encouraged them to contact our office with any questions or concerns.       Jack Santiago MD  Hand and Upper Extremity Surgery      *This note was dictated using Dragon voice recognition software. 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

## 2025-07-03 ENCOUNTER — OFFICE VISIT (OUTPATIENT)
Dept: PHYSICAL THERAPY | Age: 35
End: 2025-07-03
Attending: EMERGENCY MEDICINE
Payer: COMMERCIAL

## 2025-07-03 DIAGNOSIS — M79.604 LUMBAR PAIN WITH RADIATION DOWN RIGHT LEG: Primary | ICD-10-CM

## 2025-07-03 DIAGNOSIS — M54.50 LUMBAR PAIN WITH RADIATION DOWN RIGHT LEG: Primary | ICD-10-CM

## 2025-07-03 PROCEDURE — 97112 NEUROMUSCULAR REEDUCATION: CPT

## 2025-07-03 PROCEDURE — 97110 THERAPEUTIC EXERCISES: CPT

## 2025-07-03 NOTE — HOME EXERCISE EDUCATION
Program_ID:[id number]   Access Code: [email or web address]  URL: [email or web address]  Date: 07-  Prepared By: Lucrecia Hein    Program Notes      Exercises      - Radial Nerve Flossing - 1 x daily - 7 x weekly - 3 sets - 10 reps      - Median Nerve Flossing - Tray - 1 x daily - 7 x weekly - 3 sets - 10 reps      - Ulnar Nerve Flossing - 1 x daily - 7 x weekly - 3 sets - 10 reps      - Seated Cervical Protraction - 1 x daily - 7 x weekly - 3 sets - 10 reps      - Seated Shoulder Shrugs - 1 x daily - 7 x weekly - 3 sets - 10 reps      - Towel Roll  with Forearm in Neutral - 1 x daily - 7 x weekly - 3 sets - 10 reps

## 2025-07-03 NOTE — PROGRESS NOTES
Daily Note     Today's date: 7/3/2025  Patient name: Meagan Quan  : 1990  MRN: [id number]  Referring provider: Santos Santiago MD  Dx:   Encounter Diagnosis     ICD-10-CM    1. Lumbar pain with radiation down right leg  M54.50     M79.604           Start Time: 1615  Stop Time: 1700  Total time in clinic (min): 45 minutes    Subjective: Pt plans to transfer to [facility] on [facility] as it is closer to work and she would like to have her back, elbow, and wrist treated at one location. She presents with R sided radiculars. She continues to have difficulty identifying aggravating factors. She has R hand radiculars associated with sensation and radicular changes.      Objective: See treatment diary below      Assessment: Discussed hand POC. I have provided her with an HEP for her R hand and described the exercises. Included this today as a band aid until her initial evaluation with the hand PT on . I encouraged her to keep this appointment to see if it would be more beneficial then what we are doing here, that way she can decide if she wants to transfer locations or remain at [place] for both the hand, wrist, and back. Introduced patient to POC following initial evaluation. Care focused on pain relieving motions, identifying aggravating vs relieving motions, and centralize radiculars. I was able to identify extension as a centralizing motion. Her L5-S1 neural tension is severe, while completing PROM sciatic nerve glides she could not tolerate more than 20 degrees of hip flexion. Additionally, the neural tension is creating hypertonicity of the piriformis that was palpable with manual. Tolerated treatment fair. Patient demonstrated fatigue post treatment      Plan: Continue per plan of care.        POC expires Unit limit Auth Expiration date PT/OT + Visit Limit?   25 BOMN done 20                            Visit/Unit Tracking  AUTH Status:  Date 6/18 7/3              Used  2             12  "Remaining  11 10             20  19 18              FOTO                     Precautions: R arm pain and weakness       Manuals 6/18 7/3             piriformis stretch  5'            Sciatic nerve glides   20* ff JM x20                                      Neuro Re-Ed                                                                                                        Ther Ex             GS  5\"x10            LTRs  x10           Pelvic tilts   5\" x10            Standing hip extensions   X14 fatigued            Prone on wedge   5'  X10 ppu           Judy   X10            HR  X30                                                                                                                    Ther Activity                                       Gait Training                                       Modalities                                            "

## 2025-07-07 ENCOUNTER — OFFICE VISIT (OUTPATIENT)
Dept: PHYSICAL THERAPY | Age: 35
End: 2025-07-07
Attending: EMERGENCY MEDICINE
Payer: COMMERCIAL

## 2025-07-07 DIAGNOSIS — M79.604 LUMBAR PAIN WITH RADIATION DOWN RIGHT LEG: Primary | ICD-10-CM

## 2025-07-07 DIAGNOSIS — M54.50 LUMBAR PAIN WITH RADIATION DOWN RIGHT LEG: Primary | ICD-10-CM

## 2025-07-07 PROCEDURE — 97110 THERAPEUTIC EXERCISES: CPT
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
## 2025-07-10 ENCOUNTER — DOCUMENTATION (OUTPATIENT)
Dept: PHYSICAL THERAPY | Age: 35
End: 2025-07-10

## 2025-07-10 ENCOUNTER — APPOINTMENT (OUTPATIENT)
Dept: PHYSICAL THERAPY | Age: 35
End: 2025-07-10
Attending: EMERGENCY MEDICINE
Payer: COMMERCIAL

## 2025-07-21 ENCOUNTER — OFFICE VISIT (OUTPATIENT)
Dept: PHYSICAL THERAPY | Age: 35
End: 2025-07-21
Attending: EMERGENCY MEDICINE
Payer: COMMERCIAL

## 2025-07-21 DIAGNOSIS — M79.604 LUMBAR PAIN WITH RADIATION DOWN RIGHT LEG: Primary | ICD-10-CM

## 2025-07-21 DIAGNOSIS — M54.50 LUMBAR PAIN WITH RADIATION DOWN RIGHT LEG: Primary | ICD-10-CM

## 2025-07-21 PROCEDURE — 97110 THERAPEUTIC EXERCISES: CPT

## 2025-07-21 NOTE — PROGRESS NOTES
"Daily Note     Today's date: 2025  Patient name: Meagan Quan  : 1990  MRN: [id number]  Referring provider: Santos Santiago MD  Dx:   Encounter Diagnosis     ICD-10-CM    1. Lumbar pain with radiation down right leg  M54.50     M79.604           Start Time: 1818  Stop Time: 1900  Total time in clinic (min): 42 minutes    Subjective: Pt has had more consistent pain free days. Today she has no radicular symptoms, it is central to the SIJ       Objective: See treatment diary below      Assessment: Added DKTC, pallof press, shoulder extensions, and calf stretch. Pt had some difficulty with the tb interventions as her R hand is still bothering her. Held repeated extensions as she thinks this exacerbated the pain. Continued current POC only by increasing resistance or repetitions as outlined below. Tolerated treatment well. Patient demonstrated fatigue post treatment      Plan: Continue per plan of care.        POC expires Unit limit Auth Expiration date PT/OT + Visit Limit?   25 BOMN done 20                            Visit/Unit Tracking  AUTH Status:  Date 6/18 7/3 7/7 7/21            Used 1 2 3 4           12 Remaining  11 10 9 8           20  19 18 17 16            FOTO                     Precautions: R arm pain and weakness       Manuals 6/18 7/3  7/7 7/21          piriformis stretch  5'   5'         Sciatic nerve glides   20* ff JM x20   JM 50* 3x20                                    Neuro Re-Ed             Pallof press     Rtb 5\"x10          Shoulder extensions     Rtb x20                                                                           Ther Ex             GS  5\"x10   5\"x10          LTRs  x10  pain         Pelvic tilts   5\" x10   5\" x10          Standing hip extensions   X14 fatigued   X18          Prone on wedge   5'  X10 ppu 10' Nt          Judy   X10  Attempted - exacerbated symptoms  D/c           HR  X30            Calf stretch              Dktc     X20          nustep    5'      "                                                                      Ther Activity                                       Gait Training                                       Modalities

## 2025-07-22 ENCOUNTER — EVALUATION (OUTPATIENT)
Dept: PHYSICAL THERAPY | Facility: MEDICAL CENTER | Age: 35
End: 2025-07-22
Payer: COMMERCIAL

## 2025-07-22 DIAGNOSIS — M77.01 MEDIAL EPICONDYLITIS, RIGHT: ICD-10-CM

## 2025-07-22 DIAGNOSIS — M77.8 RIGHT WRIST TENDONITIS: Primary | ICD-10-CM

## 2025-07-22 PROCEDURE — 97140 MANUAL THERAPY 1/> REGIONS: CPT | Performed by: PHYSICAL THERAPIST

## 2025-07-22 PROCEDURE — 97161 PT EVAL LOW COMPLEX 20 MIN: CPT | Performed by: PHYSICAL THERAPIST
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
## 2025-07-25 ENCOUNTER — OFFICE VISIT (OUTPATIENT)
Dept: OBGYN CLINIC | Facility: MEDICAL CENTER | Age: 35
End: 2025-07-25
Payer: COMMERCIAL

## 2025-07-25 VITALS — HEIGHT: 64 IN | WEIGHT: 215 LBS | BODY MASS INDEX: 36.7 KG/M2

## 2025-07-25 DIAGNOSIS — M46.1 SACROILIITIS (HCC): ICD-10-CM

## 2025-07-25 DIAGNOSIS — M54.41 ACUTE BILATERAL LOW BACK PAIN WITH RIGHT-SIDED SCIATICA: Primary | ICD-10-CM
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
## 2025-07-25 DIAGNOSIS — M51.360 DEGENERATION OF INTERVERTEBRAL DISC OF LUMBAR REGION WITH DISCOGENIC BACK PAIN: ICD-10-CM

## 2025-07-25 PROCEDURE — 99213 OFFICE O/P EST LOW 20 MIN: CPT | Performed by: EMERGENCY MEDICINE
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
## 2025-07-25 NOTE — ASSESSMENT & PLAN NOTE
Reviewed Xrays L spine and PT notes  Will continue Advil and Baclofen, add Tylenol  Declines Gabapentin and Oral Steroids due to side effects  Hx Right SIJ CSI with Pain management with benefit  Will obtain updated MRI L spine for LBP with radiation down right leg with foot N/T and referred to Pain management        Orders:    MRI lumbar spine wo contrast; Future    Ambulatory referral to Spine & Pain Management; Future

## 2025-07-31 ENCOUNTER — OFFICE VISIT (OUTPATIENT)
Dept: PHYSICAL THERAPY | Facility: MEDICAL CENTER | Age: 35
End: 2025-07-31
Payer: COMMERCIAL

## 2025-07-31 DIAGNOSIS — M77.01 MEDIAL EPICONDYLITIS, RIGHT: Primary | ICD-10-CM

## 2025-07-31 DIAGNOSIS — M77.8 RIGHT WRIST TENDONITIS: ICD-10-CM

## 2025-07-31 PROCEDURE — 97110 THERAPEUTIC EXERCISES: CPT | Performed by: PHYSICAL THERAPIST

## 2025-07-31 PROCEDURE — 97112 NEUROMUSCULAR REEDUCATION: CPT | Performed by: PHYSICAL THERAPIST

## 2025-07-31 PROCEDURE — 97140 MANUAL THERAPY 1/> REGIONS: CPT | Performed by: PHYSICAL THERAPIST

## 2025-08-01 ENCOUNTER — TELEPHONE (OUTPATIENT)
Age: 35
End: 2025-08-01

## 2025-08-05 ENCOUNTER — OFFICE VISIT (OUTPATIENT)
Dept: PHYSICAL THERAPY | Facility: MEDICAL CENTER | Age: 35
End: 2025-08-05
Payer: COMMERCIAL

## 2025-08-05 DIAGNOSIS — M77.01 MEDIAL EPICONDYLITIS, RIGHT: Primary | ICD-10-CM

## 2025-08-05 DIAGNOSIS — M77.8 RIGHT WRIST TENDONITIS: ICD-10-CM

## 2025-08-05 PROCEDURE — 97110 THERAPEUTIC EXERCISES: CPT | Performed by: PHYSICAL THERAPIST

## 2025-08-05 PROCEDURE — 97140 MANUAL THERAPY 1/> REGIONS: CPT | Performed by: PHYSICAL THERAPIST

## 2025-08-12 ENCOUNTER — OFFICE VISIT (OUTPATIENT)
Dept: PHYSICAL THERAPY | Facility: MEDICAL CENTER | Age: 35
End: 2025-08-12
Payer: COMMERCIAL

## 2025-08-14 ENCOUNTER — OFFICE VISIT (OUTPATIENT)
Dept: PHYSICAL THERAPY | Facility: MEDICAL CENTER | Age: 35
End: 2025-08-14
Payer: COMMERCIAL

## 2025-08-21 ENCOUNTER — OFFICE VISIT (OUTPATIENT)
Dept: PHYSICAL THERAPY | Facility: MEDICAL CENTER | Age: 35
End: 2025-08-21
Payer: COMMERCIAL

## 2025-08-21 DIAGNOSIS — M77.8 RIGHT WRIST TENDONITIS: ICD-10-CM

## 2025-08-21 DIAGNOSIS — M77.01 MEDIAL EPICONDYLITIS, RIGHT: Primary | ICD-10-CM

## 2025-08-21 PROCEDURE — 97140 MANUAL THERAPY 1/> REGIONS: CPT | Performed by: PHYSICAL THERAPIST
